# Patient Record
Sex: MALE | Race: WHITE | NOT HISPANIC OR LATINO | Employment: OTHER | URBAN - METROPOLITAN AREA
[De-identification: names, ages, dates, MRNs, and addresses within clinical notes are randomized per-mention and may not be internally consistent; named-entity substitution may affect disease eponyms.]

---

## 2023-03-18 ENCOUNTER — HOSPITAL ENCOUNTER (OUTPATIENT)
Dept: ULTRASOUND IMAGING | Facility: CLINIC | Age: 88
Discharge: HOME | End: 2023-03-18
Attending: NURSE PRACTITIONER | Admitting: NURSE PRACTITIONER
Payer: COMMERCIAL

## 2023-03-18 DIAGNOSIS — Z01.89 ENCOUNTER FOR OTHER SPECIFIED SPECIAL EXAMINATIONS: ICD-10-CM

## 2023-03-18 PROCEDURE — 76775 US EXAM ABDO BACK WALL LIM: CPT

## 2023-08-17 ENCOUNTER — HOSPITAL ENCOUNTER (OUTPATIENT)
Dept: ULTRASOUND IMAGING | Facility: CLINIC | Age: 88
Discharge: HOME | End: 2023-08-17
Payer: COMMERCIAL

## 2023-08-17 DIAGNOSIS — Z01.89 ENCOUNTER FOR OTHER SPECIFIED SPECIAL EXAMINATIONS: ICD-10-CM

## 2023-08-17 PROCEDURE — 93925 LOWER EXTREMITY STUDY: CPT

## 2023-10-11 ENCOUNTER — HOSPITAL ENCOUNTER (OUTPATIENT)
Dept: CT IMAGING | Facility: CLINIC | Age: 88
Discharge: HOME | End: 2023-10-11
Attending: NURSE PRACTITIONER | Admitting: NURSE PRACTITIONER
Payer: COMMERCIAL

## 2023-10-11 DIAGNOSIS — Z01.89 ENCOUNTER FOR OTHER SPECIFIED SPECIAL EXAMINATIONS: ICD-10-CM

## 2023-10-11 LAB
CREAT BLD-MCNC: 1.1 MG/DL (ref 0.7–1.3)
EGFRCR SERPLBLD CKD-EPI 2021: >60 ML/MIN/1.73M2

## 2023-10-11 PROCEDURE — 75635 CT ANGIO ABDOMINAL ARTERIES: CPT

## 2023-10-11 PROCEDURE — 250N000009 HC RX 250: Performed by: RADIOLOGY

## 2023-10-11 PROCEDURE — 82565 ASSAY OF CREATININE: CPT

## 2023-10-11 PROCEDURE — 250N000011 HC RX IP 250 OP 636: Performed by: RADIOLOGY

## 2023-10-11 RX ORDER — IOPAMIDOL 755 MG/ML
90 INJECTION, SOLUTION INTRAVASCULAR ONCE
Status: COMPLETED | OUTPATIENT
Start: 2023-10-11 | End: 2023-10-11

## 2023-10-11 RX ADMIN — SODIUM CHLORIDE 100 ML: 9 INJECTION, SOLUTION INTRAVENOUS at 15:17

## 2023-10-11 RX ADMIN — IOPAMIDOL 90 ML: 755 INJECTION, SOLUTION INTRAVENOUS at 15:17

## 2024-05-18 ENCOUNTER — HOSPITAL ENCOUNTER (OUTPATIENT)
Dept: ULTRASOUND IMAGING | Facility: CLINIC | Age: 89
Discharge: HOME | End: 2024-05-18
Attending: NURSE PRACTITIONER | Admitting: NURSE PRACTITIONER
Payer: COMMERCIAL

## 2024-05-18 DIAGNOSIS — Z01.89 ENCOUNTER FOR OTHER SPECIFIED SPECIAL EXAMINATIONS: ICD-10-CM

## 2024-05-18 PROCEDURE — 93978 VASCULAR STUDY: CPT

## 2025-04-18 ENCOUNTER — APPOINTMENT (OUTPATIENT)
Dept: RADIOLOGY | Facility: HOSPITAL | Age: OVER 89
DRG: 301 | End: 2025-04-18
Attending: EMERGENCY MEDICINE
Payer: COMMERCIAL

## 2025-04-18 ENCOUNTER — HOSPITAL ENCOUNTER (INPATIENT)
Facility: HOSPITAL | Age: OVER 89
LOS: 2 days | Discharge: NURSING, REHAB OR LONG-TERM CARE | DRG: 301 | End: 2025-04-21
Attending: EMERGENCY MEDICINE | Admitting: STUDENT IN AN ORGANIZED HEALTH CARE EDUCATION/TRAINING PROGRAM
Payer: COMMERCIAL

## 2025-04-18 ENCOUNTER — APPOINTMENT (OUTPATIENT)
Dept: CT IMAGING | Facility: HOSPITAL | Age: OVER 89
DRG: 301 | End: 2025-04-18
Attending: EMERGENCY MEDICINE
Payer: COMMERCIAL
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
## 2025-04-18 DIAGNOSIS — R53.1 WEAKNESS: ICD-10-CM

## 2025-04-18 DIAGNOSIS — I82.4Z2 ACUTE DEEP VEIN THROMBOSIS (DVT) OF DISTAL END OF LEFT LOWER EXTREMITY (H): Primary | ICD-10-CM

## 2025-04-18 LAB
ALBUMIN UR-MCNC: 70 MG/DL
ANION GAP SERPL CALCULATED.3IONS-SCNC: 8 MMOL/L (ref 7–15)
APPEARANCE UR: CLEAR
BILIRUB UR QL STRIP: NEGATIVE
BUN SERPL-MCNC: 28.2 MG/DL (ref 8–23)
CALCIUM SERPL-MCNC: 8.5 MG/DL (ref 8.8–10.4)
CHLORIDE SERPL-SCNC: 106 MMOL/L (ref 98–107)
CK SERPL-CCNC: 1484 U/L (ref 39–308)
COLOR UR AUTO: ABNORMAL
CREAT SERPL-MCNC: 0.92 MG/DL (ref 0.67–1.17)
EGFRCR SERPLBLD CKD-EPI 2021: 78 ML/MIN/1.73M2
ERYTHROCYTE [DISTWIDTH] IN BLOOD BY AUTOMATED COUNT: 14.4 % (ref 10–15)
GLUCOSE BLDC GLUCOMTR-MCNC: 188 MG/DL (ref 70–99)
GLUCOSE SERPL-MCNC: 119 MG/DL (ref 70–99)
GLUCOSE UR STRIP-MCNC: NEGATIVE MG/DL
HCO3 SERPL-SCNC: 28 MMOL/L (ref 22–29)
HCT VFR BLD AUTO: 36.2 % (ref 40–53)
HGB BLD-MCNC: 11.2 G/DL (ref 13.3–17.7)
HGB UR QL STRIP: ABNORMAL
HOLD SPECIMEN: NORMAL
KETONES UR STRIP-MCNC: NEGATIVE MG/DL
LEUKOCYTE ESTERASE UR QL STRIP: NEGATIVE
MAGNESIUM SERPL-MCNC: 1.8 MG/DL (ref 1.7–2.3)
MCH RBC QN AUTO: 26.2 PG (ref 26.5–33)
MCHC RBC AUTO-ENTMCNC: 30.9 G/DL (ref 31.5–36.5)
MCV RBC AUTO: 85 FL (ref 78–100)
MUCOUS THREADS #/AREA URNS LPF: PRESENT /LPF
NITRATE UR QL: NEGATIVE
PH UR STRIP: 5 [PH] (ref 5–7)
PLATELET # BLD AUTO: 175 10E3/UL (ref 150–450)
POTASSIUM SERPL-SCNC: 4 MMOL/L (ref 3.4–5.3)
RBC # BLD AUTO: 4.27 10E6/UL (ref 4.4–5.9)
RBC URINE: 3 /HPF
SODIUM SERPL-SCNC: 142 MMOL/L (ref 135–145)
SP GR UR STRIP: 1.03 (ref 1–1.03)
TROPONIN T SERPL HS-MCNC: 20 NG/L
TROPONIN T SERPL HS-MCNC: 21 NG/L
TSH SERPL DL<=0.005 MIU/L-ACNC: 2.6 UIU/ML (ref 0.3–4.2)
UROBILINOGEN UR STRIP-MCNC: NORMAL MG/DL
WBC # BLD AUTO: 9.6 10E3/UL (ref 4–11)
WBC URINE: 1 /HPF

## 2025-04-18 PROCEDURE — 82550 ASSAY OF CK (CPK): CPT

## 2025-04-18 PROCEDURE — 72100 X-RAY EXAM L-S SPINE 2/3 VWS: CPT

## 2025-04-18 PROCEDURE — 81003 URINALYSIS AUTO W/O SCOPE: CPT | Performed by: EMERGENCY MEDICINE

## 2025-04-18 PROCEDURE — 84443 ASSAY THYROID STIM HORMONE: CPT | Performed by: EMERGENCY MEDICINE

## 2025-04-18 PROCEDURE — 83735 ASSAY OF MAGNESIUM: CPT | Performed by: EMERGENCY MEDICINE

## 2025-04-18 PROCEDURE — 258N000003 HC RX IP 258 OP 636: Performed by: EMERGENCY MEDICINE

## 2025-04-18 PROCEDURE — 80048 BASIC METABOLIC PNL TOTAL CA: CPT | Performed by: EMERGENCY MEDICINE

## 2025-04-18 PROCEDURE — 82962 GLUCOSE BLOOD TEST: CPT

## 2025-04-18 PROCEDURE — 96372 THER/PROPH/DIAG INJ SC/IM: CPT

## 2025-04-18 PROCEDURE — 96360 HYDRATION IV INFUSION INIT: CPT | Mod: 59

## 2025-04-18 PROCEDURE — 99285 EMERGENCY DEPT VISIT HI MDM: CPT | Mod: 25

## 2025-04-18 PROCEDURE — 84484 ASSAY OF TROPONIN QUANT: CPT | Performed by: EMERGENCY MEDICINE

## 2025-04-18 PROCEDURE — G0378 HOSPITAL OBSERVATION PER HR: HCPCS

## 2025-04-18 PROCEDURE — 250N000011 HC RX IP 250 OP 636

## 2025-04-18 PROCEDURE — 72125 CT NECK SPINE W/O DYE: CPT

## 2025-04-18 PROCEDURE — 93005 ELECTROCARDIOGRAM TRACING: CPT | Performed by: EMERGENCY MEDICINE

## 2025-04-18 PROCEDURE — 70450 CT HEAD/BRAIN W/O DYE: CPT

## 2025-04-18 PROCEDURE — 250N000013 HC RX MED GY IP 250 OP 250 PS 637

## 2025-04-18 PROCEDURE — 85027 COMPLETE CBC AUTOMATED: CPT | Performed by: EMERGENCY MEDICINE

## 2025-04-18 PROCEDURE — 36415 COLL VENOUS BLD VENIPUNCTURE: CPT | Performed by: EMERGENCY MEDICINE

## 2025-04-18 PROCEDURE — 96361 HYDRATE IV INFUSION ADD-ON: CPT

## 2025-04-18 PROCEDURE — 258N000003 HC RX IP 258 OP 636

## 2025-04-18 PROCEDURE — 72072 X-RAY EXAM THORAC SPINE 3VWS: CPT

## 2025-04-18 RX ORDER — ACETAMINOPHEN 325 MG/1
975 TABLET ORAL 3 TIMES DAILY
Status: DISCONTINUED | OUTPATIENT
Start: 2025-04-18 | End: 2025-04-21 | Stop reason: HOSPADM

## 2025-04-18 RX ORDER — HYDROCHLOROTHIAZIDE 12.5 MG/1
12.5 TABLET ORAL EVERY EVENING
Status: DISCONTINUED | OUTPATIENT
Start: 2025-04-18 | End: 2025-04-21 | Stop reason: HOSPADM

## 2025-04-18 RX ORDER — AMOXICILLIN 250 MG
2 CAPSULE ORAL 2 TIMES DAILY
Status: DISCONTINUED | OUTPATIENT
Start: 2025-04-18 | End: 2025-04-21 | Stop reason: HOSPADM

## 2025-04-18 RX ORDER — DOXAZOSIN 4 MG/1
8 TABLET ORAL AT BEDTIME
Status: DISCONTINUED | OUTPATIENT
Start: 2025-04-18 | End: 2025-04-21 | Stop reason: HOSPADM

## 2025-04-18 RX ORDER — AMOXICILLIN 250 MG
1 CAPSULE ORAL 2 TIMES DAILY
Status: DISCONTINUED | OUTPATIENT
Start: 2025-04-18 | End: 2025-04-21 | Stop reason: HOSPADM

## 2025-04-18 RX ORDER — ACETAMINOPHEN 650 MG/1
975 SUPPOSITORY RECTAL 3 TIMES DAILY
Status: DISCONTINUED | OUTPATIENT
Start: 2025-04-18 | End: 2025-04-21 | Stop reason: HOSPADM

## 2025-04-18 RX ORDER — VITAMIN B COMPLEX
1 TABLET ORAL 2 TIMES DAILY
COMMUNITY

## 2025-04-18 RX ORDER — DOXAZOSIN 8 MG/1
8 TABLET ORAL AT BEDTIME
COMMUNITY
Start: 2024-09-19

## 2025-04-18 RX ORDER — METOPROLOL TARTRATE 25 MG/1
25 TABLET, FILM COATED ORAL EVERY 12 HOURS
COMMUNITY
Start: 2023-12-14 | End: 2025-04-18

## 2025-04-18 RX ORDER — METOPROLOL TARTRATE 25 MG/1
25 TABLET, FILM COATED ORAL 2 TIMES DAILY
COMMUNITY

## 2025-04-18 RX ORDER — CALCIUM CARBONATE/VITAMIN D3 600 MG-10
250 TABLET ORAL EVERY EVENING
Status: DISCONTINUED | OUTPATIENT
Start: 2025-04-18 | End: 2025-04-21 | Stop reason: HOSPADM

## 2025-04-18 RX ORDER — AMOXICILLIN 250 MG
2 CAPSULE ORAL 2 TIMES DAILY PRN
Status: DISCONTINUED | OUTPATIENT
Start: 2025-04-18 | End: 2025-04-21 | Stop reason: HOSPADM

## 2025-04-18 RX ORDER — SILDENAFIL 100 MG/1
100 TABLET, FILM COATED ORAL PRN
COMMUNITY

## 2025-04-18 RX ORDER — VITAMIN B COMPLEX
25 TABLET ORAL 2 TIMES DAILY
Status: DISCONTINUED | OUTPATIENT
Start: 2025-04-18 | End: 2025-04-21 | Stop reason: HOSPADM

## 2025-04-18 RX ORDER — ONDANSETRON 2 MG/ML
4 INJECTION INTRAMUSCULAR; INTRAVENOUS EVERY 6 HOURS PRN
Status: DISCONTINUED | OUTPATIENT
Start: 2025-04-18 | End: 2025-04-21 | Stop reason: HOSPADM

## 2025-04-18 RX ORDER — ASPIRIN 81 MG/1
81 TABLET ORAL EVERY EVENING
Status: DISCONTINUED | OUTPATIENT
Start: 2025-04-18 | End: 2025-04-21 | Stop reason: HOSPADM

## 2025-04-18 RX ORDER — PROCHLORPERAZINE MALEATE 5 MG/1
5 TABLET ORAL EVERY 6 HOURS PRN
Status: DISCONTINUED | OUTPATIENT
Start: 2025-04-18 | End: 2025-04-21 | Stop reason: HOSPADM

## 2025-04-18 RX ORDER — ENOXAPARIN SODIUM 100 MG/ML
40 INJECTION SUBCUTANEOUS EVERY 24 HOURS
Status: DISCONTINUED | OUTPATIENT
Start: 2025-04-18 | End: 2025-04-21 | Stop reason: HOSPADM

## 2025-04-18 RX ORDER — ATORVASTATIN CALCIUM 40 MG/1
40 TABLET, FILM COATED ORAL AT BEDTIME
COMMUNITY

## 2025-04-18 RX ORDER — ATORVASTATIN CALCIUM 40 MG/1
40 TABLET, FILM COATED ORAL AT BEDTIME
Status: DISCONTINUED | OUTPATIENT
Start: 2025-04-18 | End: 2025-04-21 | Stop reason: HOSPADM

## 2025-04-18 RX ORDER — HYDROCHLOROTHIAZIDE 12.5 MG/1
12.5 TABLET ORAL DAILY
COMMUNITY
Start: 2024-10-24

## 2025-04-18 RX ORDER — HYDROCODONE BITARTRATE AND ACETAMINOPHEN 7.5; 325 MG/1; MG/1
1 TABLET ORAL EVERY 6 HOURS PRN
COMMUNITY

## 2025-04-18 RX ORDER — METOPROLOL TARTRATE 25 MG/1
25 TABLET, FILM COATED ORAL 2 TIMES DAILY
Status: DISCONTINUED | OUTPATIENT
Start: 2025-04-18 | End: 2025-04-21 | Stop reason: HOSPADM

## 2025-04-18 RX ORDER — ASPIRIN 81 MG/1
81 TABLET ORAL DAILY
COMMUNITY

## 2025-04-18 RX ORDER — OXYCODONE HYDROCHLORIDE 5 MG/1
5 TABLET ORAL EVERY 4 HOURS PRN
Status: DISCONTINUED | OUTPATIENT
Start: 2025-04-18 | End: 2025-04-21 | Stop reason: HOSPADM

## 2025-04-18 RX ORDER — AMOXICILLIN 250 MG
1 CAPSULE ORAL 2 TIMES DAILY PRN
Status: DISCONTINUED | OUTPATIENT
Start: 2025-04-18 | End: 2025-04-21 | Stop reason: HOSPADM

## 2025-04-18 RX ORDER — GABAPENTIN 300 MG/1
300 TABLET, FILM COATED ORAL DAILY
COMMUNITY
End: 2025-04-18

## 2025-04-18 RX ORDER — ONDANSETRON 4 MG/1
4 TABLET, ORALLY DISINTEGRATING ORAL EVERY 6 HOURS PRN
Status: DISCONTINUED | OUTPATIENT
Start: 2025-04-18 | End: 2025-04-21 | Stop reason: HOSPADM

## 2025-04-18 RX ADMIN — OXYCODONE HYDROCHLORIDE 5 MG: 5 TABLET ORAL at 23:38

## 2025-04-18 RX ADMIN — ASPIRIN 81 MG: 81 TABLET, COATED ORAL at 19:32

## 2025-04-18 RX ADMIN — METOPROLOL TARTRATE 25 MG: 25 TABLET, FILM COATED ORAL at 19:32

## 2025-04-18 RX ADMIN — ENOXAPARIN SODIUM 40 MG: 40 INJECTION SUBCUTANEOUS at 19:32

## 2025-04-18 RX ADMIN — OXYCODONE HYDROCHLORIDE 5 MG: 5 TABLET ORAL at 19:32

## 2025-04-18 RX ADMIN — SENNOSIDES AND DOCUSATE SODIUM 1 TABLET: 50; 8.6 TABLET ORAL at 19:38

## 2025-04-18 RX ADMIN — SODIUM CHLORIDE, SODIUM LACTATE, POTASSIUM CHLORIDE, AND CALCIUM CHLORIDE 500 ML: .6; .31; .03; .02 INJECTION, SOLUTION INTRAVENOUS at 19:42

## 2025-04-18 RX ADMIN — ATORVASTATIN CALCIUM 40 MG: 40 TABLET, FILM COATED ORAL at 21:31

## 2025-04-18 RX ADMIN — CYANOCOBALAMIN TAB 250 MCG 250 MCG: 250 TAB at 19:32

## 2025-04-18 RX ADMIN — HYDROCHLOROTHIAZIDE 12.5 MG: 12.5 TABLET ORAL at 19:42

## 2025-04-18 RX ADMIN — SENNOSIDES AND DOCUSATE SODIUM 1 TABLET: 50; 8.6 TABLET ORAL at 19:32

## 2025-04-18 RX ADMIN — Medication 25 MCG: at 19:32

## 2025-04-18 RX ADMIN — ACETAMINOPHEN 975 MG: 325 TABLET, FILM COATED ORAL at 19:32

## 2025-04-18 RX ADMIN — DOXAZOSIN 8 MG: 4 TABLET ORAL at 21:31

## 2025-04-18 RX ADMIN — SODIUM CHLORIDE 500 ML: 9 INJECTION, SOLUTION INTRAVENOUS at 13:53

## 2025-04-18 ASSESSMENT — ACTIVITIES OF DAILY LIVING (ADL)
ADLS_ACUITY_SCORE: 41
ADLS_ACUITY_SCORE: 38
ADLS_ACUITY_SCORE: 41
ADLS_ACUITY_SCORE: 46
ADLS_ACUITY_SCORE: 41
ADLS_ACUITY_SCORE: 36
ADLS_ACUITY_SCORE: 41
ADLS_ACUITY_SCORE: 38
ADLS_ACUITY_SCORE: 41

## 2025-04-18 ASSESSMENT — COLUMBIA-SUICIDE SEVERITY RATING SCALE - C-SSRS
2. HAVE YOU ACTUALLY HAD ANY THOUGHTS OF KILLING YOURSELF IN THE PAST MONTH?: NO
6. HAVE YOU EVER DONE ANYTHING, STARTED TO DO ANYTHING, OR PREPARED TO DO ANYTHING TO END YOUR LIFE?: NO
1. IN THE PAST MONTH, HAVE YOU WISHED YOU WERE DEAD OR WISHED YOU COULD GO TO SLEEP AND NOT WAKE UP?: NO

## 2025-04-18 NOTE — ED TRIAGE NOTES
Pt arrive via [facility] after having multiple falls and altered mental status. Pt is alert to self, situation, time, and place. Denies blood thinner use or hitting head but later states he can't remember. EMS placed on 2L via NC for mid 80s sats. Room air currently at 96%.     Triage Assessment (Adult)       Row Name 04/18/25 1252          Triage Assessment    Airway WDL WDL        Respiratory WDL    Respiratory WDL WDL        Cardiac WDL    Cardiac WDL X;rhythm     Cardiac Rhythm Heart block        Peripheral/Neurovascular WDL    Peripheral Neurovascular WDL WDL        Cognitive/Neuro/Behavioral WDL    Cognitive/Neuro/Behavioral WDL X     Level of Consciousness intermittent confusion     Arousal Level opens eyes spontaneously     Orientation person;place;time;situation     Speech clear     Mood/Behavior cooperative;calm        Pupils (CN II)    Pupil PERRLA yes     Pupil Size Left 2 mm     Pupil Size Right 2 mm

## 2025-04-18 NOTE — ED NOTES
"[facility] ED Handoff Report    ED Chief Complaint: Falls    ED Diagnosis:  (R53.1) Weakness  Comment:   Plan:     (W19.XXXA,  Y92.009) Fall at home, initial encounter  Comment:   Plan:        PMH:  No past medical history on file.     Code Status:  Full Code     Falls Risk: Yes Band: Applied    Current Living Situation/Residence: lives alone     Elimination Status: Continent: Yes     Activity Level: SBA-1 assist    Patients Preferred Language:  English     Needed: No    Vital Signs:  BP (!) 143/66   Pulse 86   Temp 98.5  F (36.9  C) (Oral)   Resp 27   Ht 1.753 m (5' 9\")   Wt 92.9 kg (204 lb 14.4 oz)   SpO2 93%   BMI 30.26 kg/m       Cardiac Rhythm: NSR    Pain Score: 3/10    Is the Patient Confused:  Yes Where is the patient located? He has intermittent confusion or impulsion. Calls frequently on light wanting to know the plan.    Last Food or Drink: 04/18/25 at dinner    Focused Assessment:  Pt arrived via EMS after having weakness and frequent falls. No thinners, no LOC. Pt using call light frequently to ask what the plan is. Would benefit from PT/OT. Alert et oriented to person and place. Pleasant and compliant.     Tests Performed: Done: Labs and Imaging    Treatments Provided:  IVFs    Family Dynamics/Concerns: No    Family Updated On Visitor Policy: Yes    Plan of Care Communicated to Family: Yes    Who Was Updated about Plan of Care: Niece who is emergency contact. She lives in [place].    Belongings Checklist Done and Signed by Patient: Yes    Medications sent with patient: None    Covid: asymptomatic , didn't test    Additional Information: Prefers recliner due to chronic back pain      Triny Mixon RN 4/18/2025 6:38 PM       "

## 2025-04-18 NOTE — SUMMARY OF CARE
Patient admitted to room 10 at approximately 1856 via wheel chair from emergency room.    Patient ambulated/transferred:  with stand-by assist. self.    Detailed List of Belongings (be very specific listing out each item):  Phone  Shoes  Jacket

## 2025-04-18 NOTE — ED PROVIDER NOTES
EMERGENCY DEPARTMENT ENCOUnter      NAME: Angelica Maharaj  AGE: 89 year old male  YOB: 1932  MRN: [id number]  EVALUATION DATE & TIME: 2025 12:54 PM    [facility]P: [facility]    ED PROVIDER: Leif Allen DO      Chief Complaint   Patient presents with    Fall    Altered Mental Status         FINAL IMPRESSION:  1. Weakness    2. Fall at home, initial encounter          ED COURSE & MEDICAL DECISION MAKIN:05 PM I met with the patient in room 19, obtained history, performed an initial exam, and discussed options and plan for diagnostics and treatment here in the ED.      The patient presented to the emergency department today due to weakness.  He has had several falls recently and does mention that he has hit his head.  He is not on any blood thinner medications.  No signs of significant traumatic injury on exam.  Initial laboratory testing today is unremarkable along with imaging test.  Urinalysis and remaining lab tests are pending at the time of signout to the oncoming provider.  Patient will likely require admission given his multiple recent falls and difficulty taking care of himself at home.        Medical Decision Making  Admission considered. Patient was signed out to the oncoming physician, disposition pending.    MIPS (CTPE, Dental pain, Boyd, Sinusitis, Asthma/COPD, Head Trauma): Not Applicable    SEPSIS: None      At the conclusion of the encounter I discussed the results of all of the tests and the disposition. The questions were answered. The patient or family acknowledged understanding and was agreeable with the care plan.       MEDICATIONS GIVEN IN THE EMERGENCY:  Medications   sodium chloride 0.9% BOLUS 500 mL (500 mLs Intravenous $New Bag 25 4951)     =================================================================    HPI        Angelica Maharaj is a 89 year old male with a pertinent history of  who presents to this ED by EMS for evaluation of fall and  "altered mental status.     Patient has had multiple falls today. He is unsure why he fell, but endorses worsened chronic back pain. His pain is located over his entire back. He did hit his head during the falls. He does not endorse pain anywhere but his back. He denied any difficulty breathing or abdominal pain.  He does not take any blood thinner medications.  No other current complaints.      PAST MEDICAL HISTORY:  No past medical history on file.    PAST SURGICAL HISTORY:  No past surgical history on file.        CURRENT MEDICATIONS:    doxazosin (CARDURA) 8 MG tablet  hydroCHLOROthiazide 12.5 MG tablet  metoprolol tartrate (LOPRESSOR) 25 MG tablet  vitamin B-12 (CYANOCOBALAMIN) 250 MCG tablet  gabapentin (GRALISE) 300 MG 24 hr tablet  raNITIdine HCl (RANITIDINE 150 MAX STRENGTH PO)  study - aspirin, IDS# [id number], 81 mg tablet        ALLERGIES:  No Known Allergies    FAMILY HISTORY:  No family history on file.    SOCIAL HISTORY:   Social History     Socioeconomic History    Marital status: Single       VITALS:  Patient Vitals for the past 24 hrs:   BP Temp Temp src Pulse Resp SpO2 Height Weight   04/18/25 1339 (!) 177/75 -- -- 87 24 96 % -- --   04/18/25 1324 (!) 177/73 -- -- 90 27 96 % -- --   04/18/25 1315 (!) 179/76 -- -- 85 -- 96 % -- --   04/18/25 1309 (!) 179/75 -- -- 85 20 96 % -- --   04/18/25 1255 (!) 185/74 98.5  F (36.9  C) Oral 89 22 95 % 1.753 m (5' 9\") 92.9 kg (204 lb 14.4 oz)       PHYSICAL EXAM    Constitutional:  Well developed, Well nourished,  HENT:  Normocephalic, Atraumatic, Oropharynx moist, Nose normal.   Eyes:  EOMI, Conjunctiva normal, No discharge.   Respiratory:  Normal breath sounds, No respiratory distress, No wheezing, No chest tenderness.   Cardiovascular:  Normal heart rate, Normal rhythm, No murmurs  GI:  Soft, No tenderness, No guarding, No CVA tenderness.   Musculoskeletal:  No tenderness to palpation or major deformities noted.   Extremities: No lower extremity edema.  No signs of " bony deformities or significant trauma.  Neurologic:  Alert & oriented x 3, No focal deficits noted.   Psychiatric:  Affect normal, Judgment normal, Mood normal.        LAB:  All pertinent labs reviewed and interpreted.  Results for orders placed or performed during the hospital encounter of 04/18/25                                                                                                                     CBC with platelets   Result Value Ref Range    WBC Count 9.6 4.0 - 11.0 10e3/uL    RBC Count 4.27 (L) 4.40 - 5.90 10e6/uL    Hemoglobin 11.2 (L) 13.3 - 17.7 g/dL    Hematocrit 36.2 (L) 40.0 - 53.0 %    MCV 85 78 - 100 fL    MCH 26.2 (L) 26.5 - 33.0 pg    MCHC 30.9 (L) 31.5 - 36.5 g/dL    RDW 14.4 10.0 - 15.0 %    Platelet Count 175 150 - 450 10e3/uL   Basic metabolic panel   Result Value Ref Range    Sodium 142 135 - 145 mmol/L    Potassium 4.0 3.4 - 5.3 mmol/L    Chloride 106 98 - 107 mmol/L    Carbon Dioxide (CO2) 28 22 - 29 mmol/L    Anion Gap 8 7 - 15 mmol/L    Urea Nitrogen 28.2 (H) 8.0 - 23.0 mg/dL    Creatinine 0.92 0.67 - 1.17 mg/dL    GFR Estimate 78 >60 mL/min/1.73m2    Calcium 8.5 (L) 8.8 - 10.4 mg/dL    Glucose 119 (H) 70 - 99 mg/dL   Result Value Ref Range    Troponin T, High Sensitivity 20 <=22 ng/L   Result Value Ref Range    Magnesium 1.8 1.7 - 2.3 mg/dL       RADIOLOGY:  I have independently reviewed and interpreted the above imaging, pending the final radiology read.  XR Lumbar Spine 2/3 Views   Final Result   IMPRESSION:    Thoracic spine: Somewhat limited evaluation due to osteopenia and overlapping tissue. Alignment is within normal limits. No spondylolisthesis. No findings to suggest an acute fracture. Normal disc spaces for age. Multilevel endplate osteophyte formation.    Median sternotomy and CABG.      Lumbar spine: Somewhat limited evaluation due to osteopenia and overlapping tissue. Alignment is within normal limits. No spondylolisthesis. No findings to suggest an acute  fracture. Mild multilevel disc space height loss. Associated endplate osteophyte    formation and facet arthropathy. Aortoiliac stent.       XR Thoracic Spine 3 Views   Final Result   IMPRESSION:    Thoracic spine: Somewhat limited evaluation due to osteopenia and overlapping tissue. Alignment is within normal limits. No spondylolisthesis. No findings to suggest an acute fracture. Normal disc spaces for age. Multilevel endplate osteophyte formation.    Median sternotomy and CABG.      Lumbar spine: Somewhat limited evaluation due to osteopenia and overlapping tissue. Alignment is within normal limits. No spondylolisthesis. No findings to suggest an acute fracture. Mild multilevel disc space height loss. Associated endplate osteophyte    formation and facet arthropathy. Aortoiliac stent.       CT Cervical Spine w/o Contrast   Final Result   IMPRESSION:   1.  No fracture or posttraumatic subluxation.   2.  No high-grade spinal canal or neural foraminal stenosis.      CT Head w/o Contrast   Final Result   IMPRESSION:   1.  No CT evidence for acute intracranial process. Brain atrophy and presumed chronic microvascular ischemic changes as above.             EKG:    Normal sinus rhythm at 86 bpm.  Right bundle branch block.  No signs of acute ischemia.   ms, QTc 512 ms.    I have independently reviewed and interpreted this EKG          I, Jose Martin Yepez, am serving as a scribe to document services personally performed by Dr. Allen based on my observation and the provider's statements to me. I, Leif Allen, DO attest that Jose Martin Yepez is acting in a scribe capacity, has observed my performance of the services and has documented them in accordance with my direction.    Leif Allen DO  Emergency Medicine  [facility] EMERGENCY DEPARTMENT  [facility] [facility] [id number] [phone]  Dept: [phone]       Leif Allen DO  04/18/25 1519       Leif Allen  DO Tawanda  04/18/25 1517

## 2025-04-18 NOTE — ED PROVIDER NOTES
"TRANSFER OF CARE NOTE ED SIGNOUT      HPI    Patient seen by Dr. Allen  For   fall and altered mental status  and signed out  care at 3:35 PM.     Pending studies include Urinalysis for admission.             PHYSICAL EXAM      VITAL SIGNS: BP (!) 143/66   Pulse 86   Temp 98.5  F (36.9  C) (Oral)   Resp 27   Ht 1.753 m (5' 9\")   Wt 92.9 kg (204 lb 14.4 oz)   SpO2 93%   BMI 30.26 kg/m    Repeat exam reveals patient is awake alert eating.  Denies any complaints.  Asked me why I am asking him the year.  He thinks is 2015.      LABS  Pertinent lab results reviewed in chart.  Results for orders placed or performed during the hospital encounter of 04/18/25   XR Thoracic Spine 3 Views    Impression    IMPRESSION:   Thoracic spine: Somewhat limited evaluation due to osteopenia and overlapping tissue. Alignment is within normal limits. No spondylolisthesis. No findings to suggest an acute fracture. Normal disc spaces for age. Multilevel endplate osteophyte formation.   Median sternotomy and CABG.    Lumbar spine: Somewhat limited evaluation due to osteopenia and overlapping tissue. Alignment is within normal limits. No spondylolisthesis. No findings to suggest an acute fracture. Mild multilevel disc space height loss. Associated endplate osteophyte   formation and facet arthropathy. Aortoiliac stent.    XR Lumbar Spine 2/3 Views    Impression    IMPRESSION:   Thoracic spine: Somewhat limited evaluation due to osteopenia and overlapping tissue. Alignment is within normal limits. No spondylolisthesis. No findings to suggest an acute fracture. Normal disc spaces for age. Multilevel endplate osteophyte formation.   Median sternotomy and CABG.    Lumbar spine: Somewhat limited evaluation due to osteopenia and overlapping tissue. Alignment is within normal limits. No spondylolisthesis. No findings to suggest an acute fracture. Mild multilevel disc space height loss. Associated endplate osteophyte   formation and facet " arthropathy. Aortoiliac stent.    CT Head w/o Contrast    Impression    IMPRESSION:  1.  No CT evidence for acute intracranial process. Brain atrophy and presumed chronic microvascular ischemic changes as above.     CT Cervical Spine w/o Contrast    Impression    IMPRESSION:  1.  No fracture or posttraumatic subluxation.  2.  No high-grade spinal canal or neural foraminal stenosis.   Extra Blue Top Tube   Result Value Ref Range    Hold Specimen JIC    Extra Red Top Tube   Result Value Ref Range    Hold Specimen JIC    Extra Green Top (Lithium Heparin) Tube   Result Value Ref Range    Hold Specimen JIC    Extra Purple Top Tube   Result Value Ref Range    Hold Specimen JIC    Extra Green Top (Lithium Heparin) ON ICE   Result Value Ref Range    Hold Specimen JIC    CBC with platelets   Result Value Ref Range    WBC Count 9.6 4.0 - 11.0 10e3/uL    RBC Count 4.27 (L) 4.40 - 5.90 10e6/uL    Hemoglobin 11.2 (L) 13.3 - 17.7 g/dL    Hematocrit 36.2 (L) 40.0 - 53.0 %    MCV 85 78 - 100 fL    MCH 26.2 (L) 26.5 - 33.0 pg    MCHC 30.9 (L) 31.5 - 36.5 g/dL    RDW 14.4 10.0 - 15.0 %    Platelet Count 175 150 - 450 10e3/uL   Basic metabolic panel   Result Value Ref Range    Sodium 142 135 - 145 mmol/L    Potassium 4.0 3.4 - 5.3 mmol/L    Chloride 106 98 - 107 mmol/L    Carbon Dioxide (CO2) 28 22 - 29 mmol/L    Anion Gap 8 7 - 15 mmol/L    Urea Nitrogen 28.2 (H) 8.0 - 23.0 mg/dL    Creatinine 0.92 0.67 - 1.17 mg/dL    GFR Estimate 78 >60 mL/min/1.73m2    Calcium 8.5 (L) 8.8 - 10.4 mg/dL    Glucose 119 (H) 70 - 99 mg/dL   UA with Microscopic reflex to Culture    Specimen: Urine, Clean Catch   Result Value Ref Range    Color Urine Light Yellow Colorless, Straw, Light Yellow, Yellow    Appearance Urine Clear Clear    Glucose Urine Negative Negative mg/dL    Bilirubin Urine Negative Negative    Ketones Urine Negative Negative mg/dL    Specific Gravity Urine 1.026 1.001 - 1.030    Blood Urine 0.2 mg/dL (A) Negative    pH Urine 5.0 5.0 -  7.0    Protein Albumin Urine 70 (A) Negative mg/dL    Urobilinogen Urine Normal Normal mg/dL    Nitrite Urine Negative Negative    Leukocyte Esterase Urine Negative Negative    Mucus Urine Present (A) None Seen /LPF    RBC Urine 3 (H) <=2 /HPF    WBC Urine 1 <=5 /HPF   Result Value Ref Range    Troponin T, High Sensitivity 20 <=22 ng/L   Result Value Ref Range    Troponin T, High Sensitivity 21 <=22 ng/L   Result Value Ref Range    TSH 2.60 0.30 - 4.20 uIU/mL   Result Value Ref Range    Magnesium 1.8 1.7 - 2.3 mg/dL         RADIOLOGY  XR Lumbar Spine 2/3 Views   Final Result   IMPRESSION:    Thoracic spine: Somewhat limited evaluation due to osteopenia and overlapping tissue. Alignment is within normal limits. No spondylolisthesis. No findings to suggest an acute fracture. Normal disc spaces for age. Multilevel endplate osteophyte formation.    Median sternotomy and CABG.      Lumbar spine: Somewhat limited evaluation due to osteopenia and overlapping tissue. Alignment is within normal limits. No spondylolisthesis. No findings to suggest an acute fracture. Mild multilevel disc space height loss. Associated endplate osteophyte    formation and facet arthropathy. Aortoiliac stent.       XR Thoracic Spine 3 Views   Final Result   IMPRESSION:    Thoracic spine: Somewhat limited evaluation due to osteopenia and overlapping tissue. Alignment is within normal limits. No spondylolisthesis. No findings to suggest an acute fracture. Normal disc spaces for age. Multilevel endplate osteophyte formation.    Median sternotomy and CABG.      Lumbar spine: Somewhat limited evaluation due to osteopenia and overlapping tissue. Alignment is within normal limits. No spondylolisthesis. No findings to suggest an acute fracture. Mild multilevel disc space height loss. Associated endplate osteophyte    formation and facet arthropathy. Aortoiliac stent.       CT Cervical Spine w/o Contrast   Final Result   IMPRESSION:   1.  No fracture or  posttraumatic subluxation.   2.  No high-grade spinal canal or neural foraminal stenosis.      CT Head w/o Contrast   Final Result   IMPRESSION:   1.  No CT evidence for acute intracranial process. Brain atrophy and presumed chronic microvascular ischemic changes as above.              ED COURSE & MEDICAL DECISION MAKING    3:35 PM The patient was signed out to me by Dr. Allen. I performed my initial exam and discussed plan for care.        At the conclusion of the encounter I discussed  the results of all of the tests and the disposition.   All questions were answered.  The patient acknowledged understanding and was agreeable with the care plan.      IMPRESSION    Mr. Maharaj is 89-year-old seen primarily by Dr. Allen please see his complete dictation.  He was signed out to me pending the urine analysis and admission.  Urinalysis does not reveal infection.  Patient admitted for frequent falls.      FINAL DIAGNOSIS:   1. Weakness    2. Fall at home, initial encounter          I, Pollo Freed, am serving as a scribe to document services personally performed by Pat Cannon MD based on my observations and the provider's statements to me.  I, Pat Cannon MD, attest that Pollo Freed is acting in a scribe capacity, has observed my performance of the services and has documented them in accordance with my direction.       Pat Cannon MD  04/18/25 4312

## 2025-04-18 NOTE — ED NOTES
Bed: JNED-19  Expected date:   Expected time:   Means of arrival:   Comments:  [facility] - 89M Multiple falls/AMS   A&O to self, neg stroke, 2nd deg type 1

## 2025-04-18 NOTE — MEDICATION SCRIBE - ADMISSION MEDICATION HISTORY
Medication Scribe Admission Medication History    Admission medication history is complete. The information provided in this note is only as accurate as the sources available at the time of the update.    Information Source(s): Patient via in-person    Pertinent Information: medication is managed by patient. Last medication taken was last night. (Per patient.) [facility] ([phone]) called pharmacy to confirmed he is not taking gabapentin.     Changes made to PTA medication list:  Added:   sildenafil, Norco , vitamin D3 , atorvastatin,   Deleted:   gabapentin, ranitidine  Changed:   Asprin from study, to Asprin 81 mg ec,     Allergies reviewed with patient and updates made in EHR: yes    Medication History Completed By: Aklilu Gebreyesus 4/18/2025 4:39 PM    PTA Med List   Medication Sig Last Dose/Taking    aspirin 81 MG EC tablet Take 81 mg by mouth daily. 4/17/2025 Bedtime    atorvastatin (LIPITOR) 40 MG tablet Take 40 mg by mouth at bedtime. 4/17/2025 Bedtime    doxazosin (CARDURA) 8 MG tablet Take 8 mg by mouth at bedtime. 4/17/2025 Evening    hydroCHLOROthiazide 12.5 MG tablet Take 12.5 mg by mouth daily. 4/17/2025 Evening    HYDROcodone-acetaminophen (NORCO) 7.5-325 MG per tablet Take 1 tablet by mouth every 6 hours as needed for severe pain, pain or moderate to severe pain. 4/17/2025 Bedtime    vitamin B-12 (CYANOCOBALAMIN) 250 MCG tablet Take 1 tablet by mouth daily. 4/17/2025 Evening    Vitamin D3 (VITAMIN D, CHOLECALCIFEROL,) 25 mcg (1000 units) tablet Take 1 tablet by mouth 2 times daily. 4/17/2025 Bedtime

## 2025-04-18 NOTE — H&P
[facility]    History and Physical - Hospitalist Service       Date of Admission:  4/18/2025    Assessment & Plan      Angelica Maharaj is a 89 year old male admitted on 4/18/2025. He has a history of AAA s/p stent graft repair (2011), CAD s/p CABG (~2009), hypertension, HLD, BPH, osteopenia, PVD who presented with recurrent falls at home and is admitted for close monitoring and further evaluation.    Recurrent Falls  Concern for cognitive dysfunction  Unclear etiology of his falls, though from the history he cannot remember appears most likely mechanical versus orthostatic hypotension. Especially worry it could be related to his chronic back pain/opiate use as below. No evidence of arrhythmia so far.  Unclear how long he was down, though was able to call his niece and an ambulance.  Workup reassuring with no ICH or vertebral fractures, labs largely normal. Vitals also reassuring, intermittently hypertensive though asymptomatic. Does report using walker and wheelchair at home which suggests history of gait difficulties, though history is unclear.  With his independent living status and current situation, he is very high risk for falls and recurrent falls, particularly with what appears to be some level of cognitive dysfunction in last 6 months, noted here as well.  Needs a safe disposition plan, suspect that he may need a higher level of care.  - Telemetry  - Orthostatic vitals ordered  - Delirium precautions  - PT/OT consulted  - Care management for dispo planning     Elevated CK  CK elevated to 1484, reassured that there is no evidence of kidney dysfunction. Electrolytes otherwise normal, does not appear to be florid rhabdo at this time. Though does suggest prolonged time down at home. Also now wondering if a prolonged time down could contribute to his unilateral leg swelling as below.   - s/p 500 ml NS bolus in ED  - Additional 500 ml LR bolus ordered now  - Recheck CK in  AM    Unilateral Left Leg Edema  Peripheral Vascular Disease  History of PVD with discussion of possible bypass in 2023, on exam with 2+ edema only in the left leg accompanied by tenderness in the left calf, though does have full range of motion.  Normal bilateral dorsalis pedis pulses also reassuring, leg does appear well-perfused.  No signs of skin or soft tissue infection.  - duplex Ultrasound ordered to r/o DVT   - MARISA left side ordered    Chronic back pain  Osteopenia  Managed long-term with chronic opiates, not reporting any recent changes or changes after the fall today though is having 8-9/10 pain.  Tender in the paraspinal muscles bilateral thoracic area.  No tenderness along spinous processes reassuring, along with no radiographic evidence of no fracture.  Wonder if worsening in this pain could have contributed to fall as above.  - Held PTA norco  - Scheduled tylenol 975 mg TID  - PRN oxycodone  - Can consider IV dilaudid if persistently uncontrolled, held off now given chronic nature  - PT/OT    Normocytic Anemia  Mild, hgb 11.2 on admission. Will continue to monitor.   - Daily CBC     Abdominal Tenderness  Constipation  Has been a couple days since last BM, normally daily, with some mild tenderness and distention though not an acute abdomen.   - Scheduled senna-docusate      Chronic conditions:  BPH - PTA doxazosin     HTN - PTA hydrochlorothiazide and metoprolol     AAA s/p stent graft repair (2011)  CAD s/p CABG  - PTA ASA 81    HLD  - PTA atorvastatin 40 mg             Observation Goals: -diagnostic tests and consults completed and resulted, -vital signs normal or at patient baseline, -tolerating oral intake to maintain hydration, -adequate pain control on oral analgesics, -returns to baseline functional status, -safe disposition plan has been identified, Nurse to notify provider when observation goals have been met and patient is ready for discharge.  Diet: Regular Diet Adult    DVT Prophylaxis:  "Enoxaparin (Lovenox) SQ  Boyd Catheter: Not present  Fluids: PO  Lines: None     Cardiac Monitoring: None  Code Status: Full Code    Clinically Significant Risk Factors Present on Admission                 # Drug Induced Platelet Defect: home medication list includes an antiplatelet medication   # Hypertension: Home medication list includes antihypertensive(s)           # Obesity: Estimated body mass index is 30.26 kg/m  as calculated from the following:    Height as of this encounter: 1.753 m (5' 9\").    Weight as of this encounter: 92.9 kg (204 lb 14.4 oz).              Disposition Plan      Expected Discharge Date: 04/19/2025                To be formally staffed in the AM       Oren Lamb MD  Hospitalist Service  [facility]  Securely message with [employer] (more info)  Text page via [facility] Paging/Directory   ______________________________________________________________________    Chief Complaint   Falls     History is obtained from the patient and patient's niece     History of Present Illness   Angelica Maharaj is a 89 year old male admitted on 4/18/2025. He has a history of AAA s/p stent graft repair (2011), CAD s/p CABG (~2009), hypertension, HLD, BPH, osteopenia, PVD who presented with recurrent falls at home    Patient able to provide history though there are many gaps due to his difficulty remembering.  He lives alone at [facility].  His niece is his emergency contact, she lives in [place] and he does not have any other close family here in the area.  Does have several friends but did not name them as decision-makers.     States that earlier today he had 2 falls when he was getting out of a chair.  He is unclear exactly what caused the falls, does not remember the fall or how long he was down. On remembers that he felt weak in the knees, was not feeling dizzy or lightheaded, no palpitations, no chest pain, no shortness of breath.  Also not reporting any headache, " abdominal pain, dysuria, or diarrhea.    States that this is a new problem today and he has not had problems with falling before.  States that he does use a walker and wheelchair to get around at home.    Also does have ongoing chronic back pain that bothers him, though has not had any recent changes to this.    Patient is a  vet, served in the [employer] and was stationed in [place].  [facility]  He is a former tobacco user, quit about 25 years ago after 40 pack years of smoking.  Does occasionally drink beer, about 2/week.  No other recreational substance use.        Spoke with niece as well:  - does have sister but they haven't talked in 20 years   - niece:  a couple years ago about medical decision making, did not fill out any paperwork.     Falls - niece unsure about falls. Talks to him 5-6 times per day. Did not answer today and was going to do a welfare check, but then he picked up. Did not mention anything about the falls. He called ambulance.     Medical history: CABG 16 years ago.   - Chronic back pain for 5 years. Can't walk very far because of it.   - Niece unaware of walker or wheelchair     [facility], [facility]. Stopped driving in last year because no vehicle.     Memory loss - niece has noticed in last 6 months, struggles with short term details.     Home - [place], [place]. Lives independently. Previously refusing nursing home. No other homes supports, does get rides to grocery store.             Past Medical History    No past medical history on file.    Past Surgical History   No past surgical history on file.    Prior to Admission Medications   Prior to Admission Medications   Prescriptions Last Dose Informant Patient Reported? Taking?   HYDROcodone-acetaminophen (NORCO) 7.5-325 MG per tablet 4/17/2025 Bedtime Self Yes Yes   Sig: Take 1 tablet by mouth every 6 hours as needed for severe pain, pain or moderate to severe pain.   Vitamin D3 (VITAMIN D,  CHOLECALCIFEROL,) 25 mcg (1000 units) tablet 4/17/2025 Bedtime Self Yes Yes   Sig: Take 1 tablet by mouth 2 times daily.   aspirin 81 MG EC tablet 4/17/2025 Bedtime Self Yes Yes   Sig: Take 81 mg by mouth daily.   atorvastatin (LIPITOR) 40 MG tablet 4/17/2025 Bedtime Self Yes Yes   Sig: Take 40 mg by mouth at bedtime.   doxazosin (CARDURA) 8 MG tablet 4/17/2025 Evening Self Yes Yes   Sig: Take 8 mg by mouth at bedtime.   hydroCHLOROthiazide 12.5 MG tablet 4/17/2025 Evening Self Yes Yes   Sig: Take 12.5 mg by mouth daily.   metoprolol tartrate (LOPRESSOR) 25 MG tablet 4/17/2025 Evening Self Yes Yes   Sig: Take 25 mg by mouth 2 times daily.   sildenafil (VIAGRA) 100 MG tablet Unknown Self Yes Yes   Sig: Take 100 mg by mouth as needed.   vitamin B-12 (CYANOCOBALAMIN) 250 MCG tablet 4/17/2025 Evening Self Yes Yes   Sig: Take 1 tablet by mouth daily.      Facility-Administered Medications: None         ROS negative except as noted in HPI above    Physical Exam   Vital Signs: Temp: 98.5  F (36.9  C) Temp src: Oral BP: (!) 143/66 Pulse: 86   Resp: 27 SpO2: 93 % O2 Device: None (Room air)    Weight: 204 lbs 14.4 oz    Constitutional: awake, alert, cooperative, no apparent distress. Sitting in chair  Eyes: Pupils equal, round and reactive to light, extra ocular muscles intact, sclera clear, conjunctiva normal  ENT: normocephalic, without obvious abnormality, atraumatic. MMM  Respiratory: No increased work of breathing, good air exchange, clear to auscultation bilaterally, no crackles or wheezing  Cardiovascular: Regular rate and rhythm, normal S1 and S2, no S3 or S4, and no murmur noted  GI: Soft, mildly distended, mild generalized tenderness to palpation near umbilicus with no rebound or guarding, no masses palpated, no hepatosplenomegaly  Skin: no rashes and no lesions visualized on exposed skin  Musculoskeletal: There is no redness, warmth, or swelling of the joints.  2+ pitting edema LLE to the knee, with tenderness to  palpation of left calf. Tenderness to palpation in B/L thoracic paraspinal muscles. No edema RLE.  Neurologic: Awake, alert, oriented to name, place (hospital in [place], not name) and time.  Cranial nerves II-XII are grossly intact.  Motor is 5 out of 5 bilaterally upper and lower extremities.  Sensory is intact to light touch in all four extremities.  Neuropsychiatric: General: normal, calm, and normal eye contact  Level of consciousness: alert / normal  Affect: normal and pleasant    Medical Decision Making       Please see A&P for additional details of medical decision making.      Data     I have personally reviewed the following data over the past 24 hrs:    9.6  \   11.2 (L)   / 175     142 106 28.2 (H) /  119 (H)   4.0 28 0.92 \     Trop: 21 BNP: N/A     TSH: 2.60 T4: N/A A1C: N/A       Imaging results reviewed over the past 24 hrs:   Recent Results (from the past 24 hours)   CT Head w/o Contrast    Narrative    EXAM: CT HEAD W/O CONTRAST  LOCATION: [facility]  DATE: 4/18/2025    INDICATION: fall. Injury. Pain.  COMPARISON: None.  TECHNIQUE: Routine CT Head without IV contrast. Multiplanar reformats. Dose reduction techniques were used.    FINDINGS:  INTRACRANIAL CONTENTS: No intracranial hemorrhage, extraaxial collection, or mass effect.  No CT evidence of acute infarct. Moderate presumed chronic small vessel ischemic changes. Chronic lacunar infarct in the left subinsular white matter. Moderate   generalized volume loss. No hydrocephalus. Skull base vascular calcifications.    VISUALIZED ORBITS/SINUSES/MASTOIDS: No intraorbital abnormality. No paranasal sinus mucosal disease. No middle ear or mastoid effusion.    BONES/SOFT TISSUES: No acute abnormality.      Impression    IMPRESSION:  1.  No CT evidence for acute intracranial process. Brain atrophy and presumed chronic microvascular ischemic changes as above.     CT Cervical Spine w/o Contrast    Narrative    EXAM: CT CERVICAL  SPINE W/O CONTRAST  LOCATION: [facility]  DATE: 4/18/2025    INDICATION: fall. Injury. Pain.  COMPARISON: None.  TECHNIQUE: Routine CT Cervical Spine without IV contrast. Multiplanar reformats. Dose reduction techniques were used.    FINDINGS:  VERTEBRA: Normal vertebral body heights and alignment. No fracture or posttraumatic subluxation.     CANAL/FORAMINA: No evidence for significant central canal stenosis at any of the cervical levels. Mild to moderate foraminal stenosis at multiple levels due to hypertrophic osteophytic spurring. Significant facet arthropathy with hypertrophic changes and   ankylosis at multiple areas.    PARASPINAL: Centrilobular emphysema and the upper lungs.      Impression    IMPRESSION:  1.  No fracture or posttraumatic subluxation.  2.  No high-grade spinal canal or neural foraminal stenosis.   XR Thoracic Spine 3 Views    Narrative    EXAM: XR THORACIC SPINE 3 VIEWS, XR LUMBAR SPINE 2/3 VIEWS  LOCATION: [facility]  DATE: 4/18/2025    INDICATION: back pain  COMPARISON: None.      Impression    IMPRESSION:   Thoracic spine: Somewhat limited evaluation due to osteopenia and overlapping tissue. Alignment is within normal limits. No spondylolisthesis. No findings to suggest an acute fracture. Normal disc spaces for age. Multilevel endplate osteophyte formation.   Median sternotomy and CABG.    Lumbar spine: Somewhat limited evaluation due to osteopenia and overlapping tissue. Alignment is within normal limits. No spondylolisthesis. No findings to suggest an acute fracture. Mild multilevel disc space height loss. Associated endplate osteophyte   formation and facet arthropathy. Aortoiliac stent.    XR Lumbar Spine 2/3 Views    Narrative    EXAM: XR THORACIC SPINE 3 VIEWS, XR LUMBAR SPINE 2/3 VIEWS  LOCATION: [facility]  DATE: 4/18/2025    INDICATION: back pain  COMPARISON: None.      Impression    IMPRESSION:   Thoracic  spine: Somewhat limited evaluation due to osteopenia and overlapping tissue. Alignment is within normal limits. No spondylolisthesis. No findings to suggest an acute fracture. Normal disc spaces for age. Multilevel endplate osteophyte formation.   Median sternotomy and CABG.    Lumbar spine: Somewhat limited evaluation due to osteopenia and overlapping tissue. Alignment is within normal limits. No spondylolisthesis. No findings to suggest an acute fracture. Mild multilevel disc space height loss. Associated endplate osteophyte   formation and facet arthropathy. Aortoiliac stent.

## 2025-04-19 ENCOUNTER — APPOINTMENT (OUTPATIENT)
Dept: ULTRASOUND IMAGING | Facility: HOSPITAL | Age: OVER 89
DRG: 301 | End: 2025-04-19
Attending: STUDENT IN AN ORGANIZED HEALTH CARE EDUCATION/TRAINING PROGRAM
Payer: COMMERCIAL

## 2025-04-19 ENCOUNTER — APPOINTMENT (OUTPATIENT)
Dept: ULTRASOUND IMAGING | Facility: HOSPITAL | Age: OVER 89
DRG: 301 | End: 2025-04-19
Payer: COMMERCIAL

## 2025-04-19 ENCOUNTER — APPOINTMENT (OUTPATIENT)
Dept: PHYSICAL THERAPY | Facility: HOSPITAL | Age: OVER 89
DRG: 301 | End: 2025-04-19
Attending: STUDENT IN AN ORGANIZED HEALTH CARE EDUCATION/TRAINING PROGRAM
Payer: COMMERCIAL

## 2025-04-19 ENCOUNTER — APPOINTMENT (OUTPATIENT)
Dept: OCCUPATIONAL THERAPY | Facility: HOSPITAL | Age: OVER 89
DRG: 301 | End: 2025-04-19
Attending: STUDENT IN AN ORGANIZED HEALTH CARE EDUCATION/TRAINING PROGRAM
Payer: COMMERCIAL

## 2025-04-19 LAB
ANION GAP SERPL CALCULATED.3IONS-SCNC: 5 MMOL/L (ref 7–15)
BUN SERPL-MCNC: 23.9 MG/DL (ref 8–23)
CALCIUM SERPL-MCNC: 8.4 MG/DL (ref 8.8–10.4)
CHLORIDE SERPL-SCNC: 104 MMOL/L (ref 98–107)
CK SERPL-CCNC: 1636 U/L (ref 39–308)
CK SERPL-CCNC: 2273 U/L (ref 39–308)
CREAT SERPL-MCNC: 1.02 MG/DL (ref 0.67–1.17)
EGFRCR SERPLBLD CKD-EPI 2021: 69 ML/MIN/1.73M2
ERYTHROCYTE [DISTWIDTH] IN BLOOD BY AUTOMATED COUNT: 14.4 % (ref 10–15)
GLUCOSE SERPL-MCNC: 95 MG/DL (ref 70–99)
HCO3 SERPL-SCNC: 31 MMOL/L (ref 22–29)
HCT VFR BLD AUTO: 33.5 % (ref 40–53)
HGB BLD-MCNC: 10.5 G/DL (ref 13.3–17.7)
MCH RBC QN AUTO: 26.4 PG (ref 26.5–33)
MCHC RBC AUTO-ENTMCNC: 31.3 G/DL (ref 31.5–36.5)
MCV RBC AUTO: 84 FL (ref 78–100)
PLATELET # BLD AUTO: 152 10E3/UL (ref 150–450)
POTASSIUM SERPL-SCNC: 4 MMOL/L (ref 3.4–5.3)
RBC # BLD AUTO: 3.97 10E6/UL (ref 4.4–5.9)
SODIUM SERPL-SCNC: 140 MMOL/L (ref 135–145)
WBC # BLD AUTO: 7.7 10E3/UL (ref 4–11)

## 2025-04-19 PROCEDURE — 258N000003 HC RX IP 258 OP 636

## 2025-04-19 PROCEDURE — 36415 COLL VENOUS BLD VENIPUNCTURE: CPT

## 2025-04-19 PROCEDURE — 85027 COMPLETE CBC AUTOMATED: CPT

## 2025-04-19 PROCEDURE — G0378 HOSPITAL OBSERVATION PER HR: HCPCS

## 2025-04-19 PROCEDURE — 80048 BASIC METABOLIC PNL TOTAL CA: CPT

## 2025-04-19 PROCEDURE — 99222 1ST HOSP IP/OBS MODERATE 55: CPT | Mod: GC

## 2025-04-19 PROCEDURE — 97161 PT EVAL LOW COMPLEX 20 MIN: CPT | Mod: GP

## 2025-04-19 PROCEDURE — 97165 OT EVAL LOW COMPLEX 30 MIN: CPT | Mod: GO

## 2025-04-19 PROCEDURE — 250N000013 HC RX MED GY IP 250 OP 250 PS 637

## 2025-04-19 PROCEDURE — 93971 EXTREMITY STUDY: CPT | Mod: LT

## 2025-04-19 PROCEDURE — 97535 SELF CARE MNGMENT TRAINING: CPT | Mod: GO

## 2025-04-19 PROCEDURE — 120N000001 HC R&B MED SURG/OB

## 2025-04-19 PROCEDURE — 97116 GAIT TRAINING THERAPY: CPT | Mod: GP

## 2025-04-19 PROCEDURE — 93922 UPR/L XTREMITY ART 2 LEVELS: CPT | Mod: 52

## 2025-04-19 PROCEDURE — 93925 LOWER EXTREMITY STUDY: CPT

## 2025-04-19 PROCEDURE — 82550 ASSAY OF CK (CPK): CPT

## 2025-04-19 RX ORDER — NALOXONE HYDROCHLORIDE 0.4 MG/ML
0.2 INJECTION, SOLUTION INTRAMUSCULAR; INTRAVENOUS; SUBCUTANEOUS
Status: DISCONTINUED | OUTPATIENT
Start: 2025-04-19 | End: 2025-04-21 | Stop reason: HOSPADM

## 2025-04-19 RX ORDER — NALOXONE HYDROCHLORIDE 0.4 MG/ML
0.4 INJECTION, SOLUTION INTRAMUSCULAR; INTRAVENOUS; SUBCUTANEOUS
Status: DISCONTINUED | OUTPATIENT
Start: 2025-04-19 | End: 2025-04-21 | Stop reason: HOSPADM

## 2025-04-19 RX ORDER — SODIUM CHLORIDE 9 MG/ML
INJECTION, SOLUTION INTRAVENOUS CONTINUOUS
Status: DISCONTINUED | OUTPATIENT
Start: 2025-04-19 | End: 2025-04-20

## 2025-04-19 RX ADMIN — DOXAZOSIN 8 MG: 4 TABLET ORAL at 21:12

## 2025-04-19 RX ADMIN — ACETAMINOPHEN 975 MG: 325 TABLET, FILM COATED ORAL at 13:54

## 2025-04-19 RX ADMIN — ACETAMINOPHEN 975 MG: 325 TABLET, FILM COATED ORAL at 21:01

## 2025-04-19 RX ADMIN — HYDROCHLOROTHIAZIDE 12.5 MG: 12.5 TABLET ORAL at 21:11

## 2025-04-19 RX ADMIN — SENNOSIDES AND DOCUSATE SODIUM 1 TABLET: 50; 8.6 TABLET ORAL at 21:00

## 2025-04-19 RX ADMIN — ASPIRIN 81 MG: 81 TABLET, COATED ORAL at 21:01

## 2025-04-19 RX ADMIN — OXYCODONE HYDROCHLORIDE 5 MG: 5 TABLET ORAL at 21:01

## 2025-04-19 RX ADMIN — ACETAMINOPHEN 975 MG: 325 TABLET, FILM COATED ORAL at 08:22

## 2025-04-19 RX ADMIN — CYANOCOBALAMIN TAB 250 MCG 250 MCG: 250 TAB at 21:01

## 2025-04-19 RX ADMIN — OXYCODONE HYDROCHLORIDE 5 MG: 5 TABLET ORAL at 04:25

## 2025-04-19 RX ADMIN — Medication 25 MCG: at 21:00

## 2025-04-19 RX ADMIN — SODIUM CHLORIDE: 0.9 INJECTION, SOLUTION INTRAVENOUS at 10:31

## 2025-04-19 RX ADMIN — OXYCODONE HYDROCHLORIDE 5 MG: 5 TABLET ORAL at 10:06

## 2025-04-19 RX ADMIN — ATORVASTATIN CALCIUM 40 MG: 40 TABLET, FILM COATED ORAL at 21:00

## 2025-04-19 RX ADMIN — APIXABAN 10 MG: 5 TABLET, FILM COATED ORAL at 08:22

## 2025-04-19 RX ADMIN — Medication 25 MCG: at 08:22

## 2025-04-19 RX ADMIN — SENNOSIDES AND DOCUSATE SODIUM 1 TABLET: 50; 8.6 TABLET ORAL at 08:22

## 2025-04-19 RX ADMIN — APIXABAN 10 MG: 5 TABLET, FILM COATED ORAL at 21:11

## 2025-04-19 ASSESSMENT — ACTIVITIES OF DAILY LIVING (ADL)
WALKING_OR_CLIMBING_STAIRS: TRANSFERRING DIFFICULTY, REQUIRES EQUIPMENT;STAIR CLIMBING DIFFICULTY, REQUIRES EQUIPMENT;AMBULATION DIFFICULTY, REQUIRES EQUIPMENT
EQUIPMENT_CURRENTLY_USED_AT_HOME: CANE, STRAIGHT;WALKER, STANDARD
ADLS_ACUITY_SCORE: 48
DEPENDENT_IADLS:: TRANSPORTATION
WEAR_GLASSES_OR_BLIND: NO
ADLS_ACUITY_SCORE: 47
ADLS_ACUITY_SCORE: 48
ADLS_ACUITY_SCORE: 48
ADLS_ACUITY_SCORE: 47
ADLS_ACUITY_SCORE: 47
ADLS_ACUITY_SCORE: 48
HEARING_DIFFICULTY_OR_DEAF: NO
ADLS_ACUITY_SCORE: 47
NUMBER_OF_TIMES_PATIENT_HAS_FALLEN_WITHIN_LAST_SIX_MONTHS: 2
ADLS_ACUITY_SCORE: 48
DOING_ERRANDS_INDEPENDENTLY_DIFFICULTY: NO
ADLS_ACUITY_SCORE: 47
WALKING_OR_CLIMBING_STAIRS_DIFFICULTY: YES
CONCENTRATING,_REMEMBERING_OR_MAKING_DECISIONS_DIFFICULTY: NO
DIFFICULTY_EATING/SWALLOWING: NO
ADLS_ACUITY_SCORE: 48
ADLS_ACUITY_SCORE: 48
TOILETING_ISSUES: YES
TOILETING_ASSISTANCE: TOILETING DIFFICULTY, ASSISTANCE 1 PERSON
DRESSING/BATHING_DIFFICULTY: YES
FALL_HISTORY_WITHIN_LAST_SIX_MONTHS: YES
DIFFICULTY_COMMUNICATING: NO
CHANGE_IN_FUNCTIONAL_STATUS_SINCE_ONSET_OF_CURRENT_ILLNESS/INJURY: YES
ADLS_ACUITY_SCORE: 48
ADLS_ACUITY_SCORE: 47
ADLS_ACUITY_SCORE: 48
ADLS_ACUITY_SCORE: 47
DRESSING/BATHING: BATHING DIFFICULTY, ASSISTANCE 1 PERSON;DRESSING DIFFICULTY, ASSISTANCE 1 PERSON
ADLS_ACUITY_SCORE: 47
ADLS_ACUITY_SCORE: 48
ADLS_ACUITY_SCORE: 47

## 2025-04-19 NOTE — PLAN OF CARE
PRIMARY DIAGNOSIS: DVT  OUTPATIENT/OBSERVATION GOALS TO BE MET BEFORE DISCHARGE:  1. Anticoagulant treatment initiated: Yes; Eliquis    2. Diagnostic testing complete (if applicable): Yes    3. Adequate home care or support arranged for LMWH administration: N/A    4. Return to near baseline physical activity:  Assist of one OOB, pt at baseline lives alone but has been having frequent falls.    5. Pain Status: Improved-controlled with oral pain medications.    Discharge Planner Nurse   Safe discharge environment identified:  may need higher level of care.  Barriers to discharge: Yes       Entered by: Lexie Elena RN 04/19/2025 6:39 AM   Lexie Elena RN    Please review provider order for any additional goals.   Nurse to notify provider when observation goals have been met and patient is ready for discharge.Goal Outcome Evaluation:

## 2025-04-19 NOTE — PLAN OF CARE
"PRIMARY DIAGNOSIS: \"GENERIC\" NURSING  OUTPATIENT/OBSERVATION GOALS TO BE MET BEFORE DISCHARGE:  ADLs back to baseline: No    Activity and level of assistance: ax1 w/gb & walker    Pain status: Improved-controlled with oral pain medications.    Return to near baseline physical activity: No     Discharge Planner Nurse   Safe discharge environment identified: No  Barriers to discharge: Yes       Entered by: Gurpreet Baker RN 04/18/2025 7:48 PM     Please review provider order for any additional goals.   Nurse to notify provider when observation goals have been met and patient is ready for discharge.  "

## 2025-04-19 NOTE — PROGRESS NOTES
Brief progress note 9:48 PM    Paged by RN for irregularity on telemetry.  Vitals otherwise normal, remains hypertensive 180/76.      Reviewed the strip, around 7 PM it was noted that he had irregularities in his R-R intervals and possibly less P waves.  On my review, had P waves earlier but during this short section did not definitely see P waves.  Did have some PACs intermixed as well.  Importantly, no dropped beats, just persistent first-degree AV block.  Telemetry strip now improved and regular.    Possible that there could have been a brief stint of paroxysmal A-fib, if recurrent could possibly be related to his falls.  With the pattern though it also appeared like it could be consistent with respiratory variation in R-R interval, though would expect more clear P waves with this.    Will continue to monitor overnight, may benefit from zio patch at discharge    Oren Lamb MD  PGY-1 [facility]

## 2025-04-19 NOTE — PLAN OF CARE
"PRIMARY DIAGNOSIS: \"GENERIC\" NURSING  OUTPATIENT/OBSERVATION GOALS TO BE MET BEFORE DISCHARGE:  ADLs back to baseline: Yes    Activity and level of assistance: ax1 w/gb & walker    Pain status: Improved-controlled with oral pain medications.    Return to near baseline physical activity: Yes     Discharge Planner Nurse   Safe discharge environment identified: No  Barriers to discharge: Yes       Entered by: Gurpreet Baker RN 04/19/2025 11:56 AM     Please review provider order for any additional goals.   Nurse to notify provider when observation goals have been met and patient is ready for discharge.  "

## 2025-04-19 NOTE — PLAN OF CARE
[facility] [id number]    Problem: Adult Inpatient Plan of Care  Goal: Absence of Hospital-Acquired Illness or Injury  Intervention: Identify and Manage Fall Risk  Recent Flowsheet Documentation  Taken 4/19/2025 0815 by Gurpreet Baker RN  Safety Promotion/Fall Prevention:   activity supervised   clutter free environment maintained   increased rounding and observation   increase visualization of patient   mobility aid in reach   nonskid shoes/slippers when out of bed   room near nurse's station   safety round/check completed     Problem: Delirium  Goal: Improved Behavioral Control  Intervention: Minimize Safety Risk  Recent Flowsheet Documentation  Taken 4/19/2025 0815 by Gurpreet Baker RN  Enhanced Safety Measures: review medications for side effects with activity  Trust Relationship/Rapport:   care explained   questions answered   reassurance provided   thoughts/feelings acknowledged    Problem: Delirium  Goal: Improved Attention and Thought Clarity  Intervention: Maximize Cognitive Function  Recent Flowsheet Documentation  Taken 4/19/2025 0815 by Gurpreet Baker RN  Sensory Stimulation Regulation:   care clustered   auditory stimulation minimized   quiet environment promoted  Reorientation Measures:   calendar in view   clock in view     Problem: Pain Acute  Goal: Optimal Pain Control and Function  4/19/2025 0941 by Gurpreet Baker RN  Outcome: Progressing  Intervention: Prevent or Manage Pain  Recent Flowsheet Documentation  Taken 4/19/2025 0815 by Gurpreet Baker RN  Sensory Stimulation Regulation:   care clustered   auditory stimulation minimized   quiet environment promoted  Medication Review/Management: medications reviewed    Problem: Comorbidity Management  Goal: Blood Pressure in Desired Range  4/19/2025 0941 by Gurpreet Baker RN  Outcome: Progressing  Intervention: Maintain Blood Pressure Management  Recent Flowsheet Documentation  Taken 4/19/2025 0815 by Gurpreet Baker RN  Medication Review/Management:  medications reviewed    Goal Outcome Evaluation:      Plan of Care Reviewed With: patient      A/o to self & place. RA. CM/OT/PT following. Pt reports back pain (chronic). On tele-afib. L AC SL. Up ax1 w/ walker & gb to bathroom. Strict I&O. Continue poc.

## 2025-04-19 NOTE — PROGRESS NOTES
Physical Therapy        04/19/25 1440   Appointment Info   Signing Clinician's Name / Credentials (PT) Clair Ambrosio, PT, DPT   Living Environment   People in Home alone   Current Living Arrangements independent living facility   Home Accessibility no concerns   Transportation Anticipated car, drives self   Self-Care   Equipment Currently Used at Home cane, straight;walker, standard   Activity/Exercise/Self-Care Comment Per pt, he uses 4WW at home. Pt is independent with all ADLs and IADLs. Pt drives and prepares his own meals   General Information   Onset of Illness/Injury or Date of Surgery 04/18/25   Referring Physician Anette Johnson   Patient/Family Therapy Goals Statement (PT) go home   Pertinent History of Current Problem (include personal factors and/or comorbidities that impact the POC) 89 year old male admitted on 4/18/2025. He has a history of AAA s/p stent graft repair (2011), CAD s/p CABG (~2009), hypertension, HLD, BPH, osteopenia, PVD who presented with recurrent falls at home, subsequently found to have elevated CK and left lower extremity DVT.   Existing Precautions/Restrictions fall   Cognition   Cognitive Status Comments some confusion, but grossly oriented and able to follow instructions   Pain Assessment   Patient Currently in Pain No   Integumentary/Edema   Integumentary/Edema no deficits were identifed   Posture    Posture Forward head position;Protracted shoulders   Range of Motion (ROM)   Range of Motion ROM is WFL   Strength (Manual Muscle Testing)   Strength (Manual Muscle Testing) Deficits observed during functional mobility   Bed Mobility   Comment, (Bed Mobility) pt seated EOB upon arrival of PT   Transfers   Comment, (Transfers) CGA sit to stand   Gait/Stairs (Locomotion)   Comment, (Gait/Stairs) 15' with 4WW, CGA, flexed posture and walker too far ahead of pt   Balance   Balance Comments mild impairment   Sensory Examination   Sensory Perception patient reports no sensory  changes   Coordination   Coordination no deficits were identified   Muscle Tone   Muscle Tone no deficits were identified   Clinical Impression   Criteria for Skilled Therapeutic Intervention Yes, treatment indicated   PT Diagnosis (PT) difficulty walking   Influenced by the following impairments decreased strength, balance, and safety awareness   Functional limitations due to impairments limited household mobility   Clinical Presentation (PT Evaluation Complexity) stable   Clinical Presentation Rationale presents as medically diagnosed   Clinical Decision Making (Complexity) low complexity   Planned Therapy Interventions (PT) balance training;bed mobility training;gait training;home exercise program;neuromuscular re-education;patient/family education;ROM (range of motion);stair training;strengthening;transfer training;progressive activity/exercise   Risk & Benefits of therapy have been explained evaluation/treatment results reviewed;current/potential barriers reviewed;participants voiced agreement with care plan;participants included;patient   PT Total Evaluation Time   PT Eval, Low Complexity Minutes (08770) 10   Physical Therapy Goals   PT Frequency 6x/week   PT Predicted Duration/Target Date for Goal Attainment 04/26/25   PT Goals Transfers;Gait   PT: Transfers Modified independent;Sit to/from stand   PT: Gait Modified independent;150 feet;Rolling walker   Interventions   Interventions Quick Adds Gait Training;Therapeutic Activity   Therapeutic Activity   Therapeutic Activities: dynamic activities to improve functional performance Minutes (98306) 3   Treatment Detail/Skilled Intervention sit<>stand with 4WW, cues for reaching destination and hand placement   Gait Training   Gait Training Minutes (29744) 12   Symptoms Noted During/After Treatment (Gait Training) fatigue   Treatment Detail/Skilled Intervention Cues for walker proximity and posture   Distance in Feet 285   [place] Level (Gait Training)  stand-by assist   Physical Assistance Level (Gait Training) 1 person assist   Assistive Device (Gait Training) rolling walker  (4WW)   Pattern Analysis (Gait Training) swing-through gait   Gait Analysis Deviations decreased cesar;decreased step length;decreased toe-to-floor clearance   PT Discharge Planning   PT Plan bring 4WW for GT, transfer training, standing ex   PT Discharge Recommendation (DC Rec) home with home care physical therapy   PT Rationale for DC Rec near baseline   PT Brief overview of current status walking with SBA   PT Total Distance Amb During Session (feet) 300   Physical Therapy Time and Intention   Timed Code Treatment Minutes 15   Total Session Time (sum of timed and untimed services) 25       Clair Ambrosio, LUNA 4/19/2025

## 2025-04-19 NOTE — PROGRESS NOTES
04/19/25 1400   Appointment Info   Signing Clinician's Name / Credentials (OT) Katya FLOOD jessica Hoffchaparro HEMPHILL/L   Living Environment   People in Home alone   Current Living Arrangements independent living facility   Home Accessibility no concerns   Transportation Anticipated car, drives self   Living Environment Comments Pt lives in apt with grab rails   Self-Care   Current Activity Tolerance moderate   Equipment Currently Used at Home walker, rolling   Fall history within last six months yes   Number of times patient has fallen within last six months 2   Activity/Exercise/Self-Care Comment Per pt, he uses 4WW at home.  Pt is independent with all ADLs and IADLs.  Pt drives.   General Information   Onset of Illness/Injury or Date of Surgery 04/18/25   Referring Physician Lorenza   Patient/Family Therapy Goal Statement (OT) home   Additional Occupational Profile Info/Pertinent History of Current Problem Pt admitted after falls, back pain   Existing Precautions/Restrictions fall   Cognitive Status Examination   Orientation Status person;time   Cognitive Status Comments Pt knows he is in hospital but did not know name of hospital.  Pt follows most directions.  Pt is a bit confused at times.  Initiated SLUMS but pt was not wanting to participate.  Will attempt again tomorrow   Visual Perception   Visual Impairment/Limitations WFL   Sensory   Sensory Comments UE's intact   Range of Motion Comprehensive   Comment, General Range of Motion WFLs for ADLs   Strength Comprehensive (MMT)   Comment, General Manual Muscle Testing (MMT) Assessment WFLs for ADLs   Coordination   Upper Extremity Coordination No deficits were identified   Bed Mobility   Comment (Bed Mobility) SBA   Transfers   Transfer Comments CGA   Balance   Balance Comments CGA with FWW   Activities of Daily Living   BADL Assessment/Intervention lower body dressing   Lower Body Dressing Assessment/Training   Comment, (Lower Body Dressing) SBA donning and doffing  socks   Clinical Impression   Criteria for Skilled Therapeutic Interventions Met (OT) Yes, treatment indicated   OT Diagnosis Impaired ADL independence   OT Problem List-Impairments impacting ADL balance;cognition;mobility   Assessment of Occupational Performance 1-3 Performance Deficits   Planned Therapy Interventions (OT) ADL retraining;balance training;cognition;transfer training   Clinical Decision Making Complexity (OT) problem focused assessment/low complexity   Risk & Benefits of therapy have been explained evaluation/treatment results reviewed;participants included;patient   Clinical Impression Comments Pt seen bedside for OT eval and treatment.  Pt demonstrates impaired independence with ADLs and and mobility.  Recommend further cogntiive assessment as pt drives and manages IADLs.  OT to address.  Recommend home with home OT.  Pt may benefit from driving eval if he is indeed still driving.   OT Total Evaluation Time   OT Eval, Low Complexity Minutes (51935) 10   OT Goals   Therapy Frequency (OT) 5 times/week   OT Predicted Duration/Target Date for Goal Attainment 04/26/25   OT Goals Hygiene/Grooming;Lower Body Dressing;Transfers;Toilet Transfer/Toileting;OT Goal 1   OT: Hygiene/Grooming supervision/stand-by assist   OT: Lower Body Dressing Supervision/stand-by assist   OT: Transfer Supervision/stand-by assist;with assistive device   OT: Toilet Transfer/Toileting Supervision/stand-by assist   OT: Goal 1 Pt will participate in formal cognitive assessment to A with discharge planning   OT Discharge Planning   OT Plan SLUMS dressing with shorts, standing G/H, walker safety   OT Discharge Recommendation (DC Rec) home with home care occupational therapy   OT Rationale for DC Rec Recommend home with home OT as pt likely close to baseline but would benefit from home safety eval to assess higher level ADLs and IADLs in the home.  Pt may also benefit from driving assessment.  Will continue to advise based on cog  assessment.   OT Brief overview of current status CGA                                                                                   [facility]      OUTPATIENT OCCUPATIONAL THERAPY  EVALUATION  PLAN OF TREATMENT FOR OUTPATIENT REHABILITATION  (COMPLETE FOR INITIAL CLAIMS ONLY)  Patient's Last Name, First Name, [email or web address].  YOB: 1932  Angelica Maharaj                             Provider's Name  [facility] Medical Record No.  [id number]                               Onset Date:  04/18/25   Start of Care Date:        Type:     ___PT   _X_OT   ___SLP Medical Diagnosis:                           OT Diagnosis:  (P) Impaired ADL independence   Visits from SOC:  1   _________________________________________________________________________________  Plan of Treatment/Functional Goals    Planned Interventions: (P) ADL retraining, balance training, cognition, transfer training   Goals: See Occupational Therapy Goals on Care Plan in [employer] electronic health record.    Therapy Frequency: (P) 5 times/week  Predicted Duration of Therapy Intervention: (P) 04/26/25  _________________________________________________________________________________    I CERTIFY THE NEED FOR THESE SERVICES FURNISHED UNDER        THIS PLAN OF TREATMENT AND WHILE UNDER MY CARE .             Physician Signature               Date    X_____________________________________________________                   ,      Referring Physician: Britt (P)            Initial Assessment        See Occupational Therapy evaluation dated   in Epic electronic health record.

## 2025-04-19 NOTE — PLAN OF CARE
PRIMARY DIAGNOSIS: GENERALIZED WEAKNESS    OUTPATIENT/OBSERVATION GOALS TO BE MET BEFORE DISCHARGE  1. Orthostatic performed: No    2. Tolerating PO medications: Yes    3. Return to near baseline physical activity:  Pt Assist of one with activity OOB, unsteady.    4. Cleared for discharge by consultants (if involved): No    Discharge Planner Nurse   Safe discharge environment identified:  may need higher level of care as pt lives alone currently.  Barriers to discharge: Yes       Entered by: Lexie Elena RN 04/19/2025 6:37 AM   Lexie Elena RN    Please review provider order for any additional goals.   Nurse to notify provider when observation goals have been met and patient is ready for discharge.Goal Outcome Evaluation:

## 2025-04-19 NOTE — PROGRESS NOTES
[facility]    Progress Note - Hospitalist Service       Date of Admission:  4/18/2025    Assessment & Plan   Angelica Maharaj is a 89 year old male admitted on 4/18/2025. He has a history of AAA s/p stent graft repair (2011), CAD s/p CABG (~2009), hypertension, HLD, BPH, osteopenia, PVD who presented with recurrent falls at home, subsequently found to have elevated CK and left lower extremity DVT.     LLE DVT  PAD  History of PVD with possible bypass in 2023, now with LLE DVT. MARISA's of bilateral lower extremities showing moderate arterial disease on the right but severe disease on the left.   - Continue eliquis 10 mg x 7 days, followed by 5 mg thereafter   - consider vascular consult     Recurrent Falls  Concern for cognitive dysfunction  Unclear etiology of his falls, though from the history he cannot remember appears most likely mechanical versus orthostatic hypotension. Especially worry it could be related to his chronic back pain/opiate use as below. No evidence of arrhythmia so far.  Unclear how long he was down, though was able to call his niece and an ambulance.  Workup reassuring with no ICH or vertebral fractures, labs largely normal. Vitals also reassuring, intermittently hypertensive though asymptomatic. Does report using walker and wheelchair at home which suggests history of gait difficulties, though history is unclear.  With his independent living status and current situation, he is very high risk for falls and recurrent falls, particularly with what appears to be some level of cognitive dysfunction in last 6 months, noted here as well.  Needs a safe disposition plan, suspect that he may need a higher level of care.  - Telemetry  - Orthostatic vitals ordered  - Delirium precautions  - PT/OT consulted, please complete SLUMS eval  - Care management for dispo planning      Elevated CK  CK elevated to 1484, reassured that there is no evidence of kidney dysfunction. Electrolytes  otherwise normal, does not appear to be florid rhabdo at this time, though does suggest prolonged time down at home.   - NS @ 125mL/hr     Chronic back pain  Osteopenia  Managed long-term with chronic opiates, not reporting any recent changes or changes after the fall today though is having 8-9/10 pain.  Tender in the paraspinal muscles bilateral thoracic area.  No tenderness along spinous processes reassuring, along with no radiographic evidence of no fracture.   - Held PTA norco  - Scheduled tylenol 975 mg TID  - PRN oxycodone  - PT/OT     Normocytic Anemia  Mild, hgb 11.2 on admission. Will continue to monitor.   - Daily CBC      Abdominal Tenderness  Constipation  Has been a couple days since last BM, normally daily, with some mild tenderness and distention though not an acute abdomen.   - Scheduled senna-docusate        Chronic conditions:  BPH - PTA doxazosin      HTN - PTA hydrochlorothiazide and metoprolol      AAA s/p stent graft repair (2011)  CAD s/p CABG  - PTA ASA 81     HLD  - PTA atorvastatin 40 mg          Observation Goals: -diagnostic tests and consults completed and resulted, -vital signs normal or at patient baseline, -tolerating oral intake to maintain hydration, -adequate pain control on oral analgesics, -returns to baseline functional status, -safe disposition plan has been identified, Nurse to notify provider when observation goals have been met and patient is ready for discharge.  Diet: Regular Diet Adult    DVT Prophylaxis: DOAC  Boyd Catheter: Not present  Fluids: NS @ 125mL/hr  Lines: None     Cardiac Monitoring: ACTIVE order. Indication: Syncope- low cardiac risk (24 hours)  Code Status: Full Code      Clinically Significant Risk Factors Present on Admission                 # Drug Induced Platelet Defect: home medication list includes an antiplatelet medication   # Hypertension: Home medication list includes antihypertensive(s)      # Anemia: based on hgb <11       # Overweight:  "Estimated body mass index is 27.8 kg/m  as calculated from the following:    Height as of this encounter: 1.753 m (5' 9\").    Weight as of this encounter: 85.4 kg (188 lb 4.4 oz).              Social Drivers of Health   Tobacco Use: Medium Risk (4/11/2024)    Received from [facility]     Patient History     Smoking Tobacco Use: Former     Smokeless Tobacco Use: Former                The patient's care was discussed with the Attending Physician, Dr. Alex MD .    Justice Britt MD  [facility], PGY-2    ______________________________________________________________________    Interval History   Feeling well this morning. Endorsing chronic low back pain but not worse than usual. Leg is not painful.     Physical Exam   Vital Signs: Temp: 97.6  F (36.4  C) Temp src: Oral BP: (!) 152/68 Pulse: 52   Resp: 18 SpO2: 97 % O2 Device: None (Room air)    Weight: 188 lbs 4.37 oz    GEN: Pleasant male, in no acute distress.   HEENT: Normocephalic, atraumatic. Extraoccular eye movements intact. Anicteric sclera. Moist mucous membranes.   NECK: Supple. No cervical or supraclavicular adenopathy.   PULM: Non-labored breathing. No use of accessory muscles. Clear to ausculation bilaterally. No wheezes or crackles.   CV: Regular rate and rhythm. Normal S1, S2. No rubs, murmurs, or gallops.    ABDOMEN: Normoactive bowel sounds. Non-tender to palpation. Non-distended.    EXTREMITES:  LLE with 2+ pitting edema. Pedal pulses intact. Non-tender to palpation with symmetric sensation. No overlying erythema.   NEURO:  Awake. Oriented to person, place, time and situation. Cranial nerves 2-12 grossly intact. Moving all extremities.    PSYCH: Calm. Appropriate affect, insight, judgment.       Medical Decision Making             Data     I have personally reviewed the following data over the past 24 hrs:    7.7  \   10.5 (L)   / 152     140 104 23.9 (H) /  95   4.0 31 (H) 1.02 \     Trop: 21 BNP: N/A     TSH: " 2.60 T4: N/A A1C: N/A       Imaging results reviewed over the past 24 hrs:   Recent Results (from the past 24 hours)   CT Head w/o Contrast    Narrative    EXAM: CT HEAD W/O CONTRAST  LOCATION: [facility]  DATE: 4/18/2025    INDICATION: fall. Injury. Pain.  COMPARISON: None.  TECHNIQUE: Routine CT Head without IV contrast. Multiplanar reformats. Dose reduction techniques were used.    FINDINGS:  INTRACRANIAL CONTENTS: No intracranial hemorrhage, extraaxial collection, or mass effect.  No CT evidence of acute infarct. Moderate presumed chronic small vessel ischemic changes. Chronic lacunar infarct in the left subinsular white matter. Moderate   generalized volume loss. No hydrocephalus. Skull base vascular calcifications.    VISUALIZED ORBITS/SINUSES/MASTOIDS: No intraorbital abnormality. No paranasal sinus mucosal disease. No middle ear or mastoid effusion.    BONES/SOFT TISSUES: No acute abnormality.      Impression    IMPRESSION:  1.  No CT evidence for acute intracranial process. Brain atrophy and presumed chronic microvascular ischemic changes as above.     CT Cervical Spine w/o Contrast    Narrative    EXAM: CT CERVICAL SPINE W/O CONTRAST  LOCATION: [facility]  DATE: 4/18/2025    INDICATION: fall. Injury. Pain.  COMPARISON: None.  TECHNIQUE: Routine CT Cervical Spine without IV contrast. Multiplanar reformats. Dose reduction techniques were used.    FINDINGS:  VERTEBRA: Normal vertebral body heights and alignment. No fracture or posttraumatic subluxation.     CANAL/FORAMINA: No evidence for significant central canal stenosis at any of the cervical levels. Mild to moderate foraminal stenosis at multiple levels due to hypertrophic osteophytic spurring. Significant facet arthropathy with hypertrophic changes and   ankylosis at multiple areas.    PARASPINAL: Centrilobular emphysema and the upper lungs.      Impression    IMPRESSION:  1.  No fracture or posttraumatic  subluxation.  2.  No high-grade spinal canal or neural foraminal stenosis.   XR Thoracic Spine 3 Views    Narrative    EXAM: XR THORACIC SPINE 3 VIEWS, XR LUMBAR SPINE 2/3 VIEWS  LOCATION: [facility]  DATE: 4/18/2025    INDICATION: back pain  COMPARISON: None.      Impression    IMPRESSION:   Thoracic spine: Somewhat limited evaluation due to osteopenia and overlapping tissue. Alignment is within normal limits. No spondylolisthesis. No findings to suggest an acute fracture. Normal disc spaces for age. Multilevel endplate osteophyte formation.   Median sternotomy and CABG.    Lumbar spine: Somewhat limited evaluation due to osteopenia and overlapping tissue. Alignment is within normal limits. No spondylolisthesis. No findings to suggest an acute fracture. Mild multilevel disc space height loss. Associated endplate osteophyte   formation and facet arthropathy. Aortoiliac stent.    XR Lumbar Spine 2/3 Views    Narrative    EXAM: XR THORACIC SPINE 3 VIEWS, XR LUMBAR SPINE 2/3 VIEWS  LOCATION: [facility]  DATE: 4/18/2025    INDICATION: back pain  COMPARISON: None.      Impression    IMPRESSION:   Thoracic spine: Somewhat limited evaluation due to osteopenia and overlapping tissue. Alignment is within normal limits. No spondylolisthesis. No findings to suggest an acute fracture. Normal disc spaces for age. Multilevel endplate osteophyte formation.   Median sternotomy and CABG.    Lumbar spine: Somewhat limited evaluation due to osteopenia and overlapping tissue. Alignment is within normal limits. No spondylolisthesis. No findings to suggest an acute fracture. Mild multilevel disc space height loss. Associated endplate osteophyte   formation and facet arthropathy. Aortoiliac stent.    US Lower Extremity Venous Duplex Left    Narrative    EXAM: ULTRASOUND LOWER EXTREMITY VENOUS DUPLEX LEFT  LOCATION: [facility]  DATE: 04/19/2025    INDICATION:  Unilateral swelling with calf tenderness to palpation, rule out DVT.  COMPARISON: None.  TECHNIQUE: Venous Duplex ultrasound of the left lower extremity with and without compression, augmentation and duplex. Color flow and spectral Doppler with waveform analysis performed.    FINDINGS: Exam includes the common femoral, femoral, popliteal, and contralateral common femoral veins as well as segmentally visualized deep calf veins and greater saphenous vein.     LEFT: The common femoral, femoral, and popliteal veins are patent and negative for thrombus. Posterior tibial and peroneal veins where seen in the calf also negative for thrombus. However, there is a thrombosed noncompressible vein within the muscles of   the left calf likely a soleal vein compatible with DVT. No superficial thrombophlebitis. No popliteal cyst.      Impression    IMPRESSION:  1.  Exam positive for DVT in the left lower extremity with occlusive thrombus within a intramuscular vein in the left calf, likely a soleal vein.    2.  No evidence for DVT elsewhere in the left lower extremity.    3.  Findings called to Dr. De La Cruz on 04/19/2025, 4:34 AM CDT.       US MARISA Doppler No Exercise 1-2 Levels Left    Narrative    EXAM: RESTING ANKLE-BRACHIAL INDICES (ABIs)  LOCATION: [facility]  DATE: 04/19/2025    INDICATION: History of peripheral vascular disease, unilateral leg swelling.  COMPARISON: None.    MARISA FINDINGS:  RIGHT  Brachial: 173  Ankle (PT): 123 Index: 0.66  Ankle (DP): 131 Index: 0.71  Digit: 87 Index: 0.47    LEFT  Brachial: 185  Ankle (PT): 80 Index: 0.43  Ankle (DP): 63 Index: 0.34  Digit: 0 Index: 0.00    The right MARISA at rest is 0.71. The left MARISA at rest is 0.43.      WAVEFORMS: Bilateral dorsalis pedis and left posterior tibial arteries waveforms were unable to be obtained.       Impression    IMPRESSION:  1.  RIGHT LOWER EXTREMITY: MARISA at rest is 0.71 consistent with moderate arterial disease.  2.  LEFT LOWER  EXTREMITY: MARISA at rest is 0.43 consistent with severe arterial disease.       US Lower Extremity Arterial Duplex Bilateral    Narrative    EXAM: ULTRASOUND LOWER EXTREMITY ARTERIAL DUPLEX BILATERAL  LOCATION: [facility]  DATE: 04/19/2025    INDICATION: History of peripheral vascular disease, unilateral left leg swelling. Left calf tenderness.  COMPARISON: Ultrasound lower extremity arterial duplex bilateral 08/17/2023.  TECHNIQUE: Duplex utilizing 2D gray-scale imaging, Doppler interrogation with color-flow and spectral waveform analysis.    FINDINGS: Extensive calcific atherosclerotic changes involving bilateral lower extremity arteries.    RIGHT LOWER EXTREMITY ARTERIAL ASSESSMENT:  External iliac artery 305/12 cm/s  Common femoral artery: 229/16 cm/s  Profunda femoris artery: 203/31 cm/s  SFA (proximal): 140/12 cm/s  SFA (mid): Obscured due to calcified plaque  SFA (distal): Obscured due to calcified plaque  Popliteal artery: 56/8 cm/s  Posterior tibial artery: 54/10 cm/s  Anterior tibial artery: 11/4 cm/s  Dorsalis pedis artery: 9/3 cm/s    LEFT LOWER EXTREMITY ARTERIAL ASSESSMENT:  External iliac artery 21/5 cm/s  Common femoral artery: 15/5 cm/s  Profunda femoris artery: 43/8 cm/s  SFA (proximal): Obscured due to calcified plaque  SFA (mid): Obscured due to calcified plaque  SFA (distal): 14/4 cm/s  Popliteal artery: 31/10 cm/s  Posterior tibial artery: Obscured due to calcified plaque  Anterior tibial artery: Obscured due to calcified plaque  Dorsalis pedis artery: Obscured due to calcified plaque      Impression    IMPRESSION:  1.  Extensive atherosclerotic changes involving bilateral lower extremity arteries, worse on the left with diminished flow velocities.    2.  Multiple segments of bilateral lower extremity arteries are obscured due to calcified plaque. If there is continued clinical concern, consider dedicated bilateral lower extremity CT angiogram runoff study.

## 2025-04-19 NOTE — CONSULTS
Care Management Initial Consult    General Information  Assessment completed with: Patient, pt  Type of CM/SW Visit: Initial Assessment    Primary Care Provider verified and updated as needed: Yes   Readmission within the last 30 days: no previous admission in last 30 days      Reason for Consult: discharge planning, length of stay  Advance Care Planning: Advance Care Planning Reviewed: no concerns identified, verified with patient     General Information Comments: lives alone and ind at baseline for personal cares, will need WCtransportation at discharge, discussed cost w/pt and niece    Communication Assessment  Patient's communication style: spoken language (English or Bilingual)    Hearing Difficulty or Deaf: no        Cognitive  Cognitive/Neuro/Behavioral: level of consciousness, orientation  Level of Consciousness: intermittent confusion  Arousal Level: opens eyes spontaneously  Orientation: disoriented to, situation, time  Mood/Behavior: cooperative, calm     Speech: clear    Living Environment:   People in home: alone     Current living Arrangements: apartment      Able to return to prior arrangements: yes       Family/Social Support:  Care provided by: self  Provides care for: no one  Marital Status: Single  Support system: Other (specify) (sammy Duckworth [phone], CM has updated her on observation admission and transportation cost at discharge for WC ride)          Description of Support System: Supportive, Involved    Support Assessment: Adequate social supports    Current Resources:   Patient receiving home care services: No        Community Resources: None  Equipment currently used at home: cane, straight, walker, standard  Supplies currently used at home: None    Employment/Financial:  Employment Status:          Financial Concerns: none   Referral to Financial Worker: No       Does the patient's insurance plan have a 3 day qualifying hospital stay waiver?  No    Lifestyle & Psychosocial  Needs:  Social Drivers of Health     Food Insecurity: Low Risk  (4/18/2025)    Food Insecurity     Within the past 12 months, did you worry that your food would run out before you got money to buy more?: No     Within the past 12 months, did the food you bought just not last and you didn t have money to get more?: No   Depression: Not at risk (12/14/2023)    Received from [facility]     PHQ-2     PHQ2 Total: 0   Housing Stability: Low Risk  (4/18/2025)    Housing Stability     Do you have housing? : Yes     Are you worried about losing your housing?: No   Tobacco Use: Medium Risk (4/11/2024)    Received from [facility]     Patient History     Smoking Tobacco Use: Former     Smokeless Tobacco Use: Former     Passive Exposure: Not on file   Financial Resource Strain: Low Risk  (4/18/2025)    Financial Resource Strain     Within the past 12 months, have you or your family members you live with been unable to get utilities (heat, electricity) when it was really needed?: No   Alcohol Use: Not on file   Transportation Needs: Low Risk  (4/18/2025)    Transportation Needs     Within the past 12 months, has lack of transportation kept you from medical appointments, getting your medicines, non-medical meetings or appointments, work, or from getting things that you need?: No   Physical Activity: Not on file   Interpersonal Safety: Not on file   Stress: Not on file   Social Connections: Not on file   Health Literacy: Not on file       Functional Status:  Prior to admission patient needed assistance:      Dependent IADLs:: Transportation  Assesssment of Functional Status: Not at baseline with ADL Functioning    Mental Health Status:  Mental Health Status: No Current Concerns       Chemical Dependency Status:                Values/Beliefs:  Spiritual, Cultural Beliefs, Druze Practices, Values that affect care:                 Discussed  Partnership in Safe Discharge Planning  document with  patient/family: No    Additional Information:  lives alone and ind at baseline for personal cares, will need WC transportation at discharge, discussed cost w/pt and niece. Per niece Donita, she speaks to pt daily and a few times a day, she has no concerns about his cognition and ability to make decisions. Pt lives alone and independent w/personal cares     Next Steps: DVT  treatment and discharge planning, transportation    Abdi Farah RN

## 2025-04-19 NOTE — PLAN OF CARE
"PRIMARY DIAGNOSIS: \"GENERIC\" NURSING  OUTPATIENT/OBSERVATION GOALS TO BE MET BEFORE DISCHARGE:  ADLs back to baseline: Yes    Activity and level of assistance: ax1 w/gb & walker    Pain status: Improved-controlled with oral pain medications.    Return to near baseline physical activity: Yes     Discharge Planner Nurse   Safe discharge environment identified: No  Barriers to discharge: Yes       Entered by: Gurpreet Baker RN 04/19/2025 8:10 AM     Please review provider order for any additional goals.   Nurse to notify provider when observation goals have been met and patient is ready for discharge.  "

## 2025-04-19 NOTE — PLAN OF CARE
Patient admitted to room 10 at approximately 1900 via wheel chair from emergency room.  Reason for Admission: Falls  Report received from: ED notes  Patient was accompanied by Self.  Discharge transportation provided by:  Patient ambulated/transferred:  with one assist. self.  Patient is alert and orientated x 2 (self & situation)  Outpatient Observation education provided to: (patient, family, friend)  MDRO Education done if applicable (MRSA, VRE, etc)  Safety risks were identified during admission:  fall.   Yellow risk/fall band applied:  Yes  Home meds sent home: No  Home meds sent to pharmacy:No IF YES add 1/2 sheet laminated page reminder to chart/clipboard   Detailed Belongings: see aid's belonging notes

## 2025-04-19 NOTE — PLAN OF CARE
Problem: Comorbidity Management  Goal: Blood Pressure in Desired Range  Outcome: Progressing  Intervention: Maintain Blood Pressure Management  Recent Flowsheet Documentation  Taken 4/18/2025 2000 by Gurpreet Baker RN  Medication Review/Management: medications reviewed     Problem: Fall Injury Risk  Goal: Absence of Fall and Fall-Related Injury  Outcome: Progressing  Intervention: Identify and Manage Contributors  Recent Flowsheet Documentation  Taken 4/18/2025 2000 by Gurpreet Baker RN  Medication Review/Management: medications reviewed  Intervention: Promote Injury-Free Environment  Recent Flowsheet Documentation  Taken 4/18/2025 2000 by Gurpreet Baker RN  Safety Promotion/Fall Prevention:   mobility aid in reach   lighting adjusted   increased rounding and observation   nonskid shoes/slippers when out of bed   room near nurse's station   safety round/check completed     Problem: Pain Acute  Goal: Optimal Pain Control and Function  Outcome: Progressing  Intervention: Prevent or Manage Pain  Recent Flowsheet Documentation  Taken 4/18/2025 2000 by Gurpreet Baker RN  Sensory Stimulation Regulation:   lighting decreased   care clustered   auditory stimulation minimized  Medication Review/Management: medications reviewed     Problem: Delirium  Goal: Optimal Coping  4/18/2025 2041 by Gurpreet Baker RN  Outcome: Progressing  Goal: Improved Behavioral Control  4/18/2025 2041 by Gurpreet Baker RN  Outcome: Progressing  Intervention: Minimize Safety Risk  Recent Flowsheet Documentation  Taken 4/18/2025 2000 by Gurpreet Baker RN  Enhanced Safety Measures: pain management  Goal: Improved Attention and Thought Clarity  4/18/2025 2041 by Gurpreet Baker RN  Outcome: Progressing  Intervention: Maximize Cognitive Function  Recent Flowsheet Documentation  Taken 4/18/2025 2000 by Gurpreet Baker RN  Sensory Stimulation Regulation:   lighting decreased   care clustered   auditory stimulation minimized  Reorientation Measures:    calendar in view   clock in view  Goal: Improved Sleep  4/18/2025 2041 by Gurpreet Baker, RN  Outcome: Progressing    Goal Outcome Evaluation:      Plan of Care Reviewed With: patient      A/ox2 - self & place. RA. On tele-SR. Back pain (chronic) reported. 1x prn oxy given. CM/OT/PT consult. LLE swelling noted. Critical lab of CK - MD notified. 1x . 500ml LR bolus given. L AC SL. Needing LLE USU. Up ax1 w/gb & walker. Continue poc.

## 2025-04-19 NOTE — SIGNIFICANT EVENT
Significant Event Note    Time of event: 4:53 AM April 19, 2025    Description of event:  HO paged from bedside RN regarding call back from [place] radiology. DVT U/S demonstrating DVT in L soleal vein, received lovenox for DVT PPX on admission ~2000 4/18. Will hold lovenox and start eliquis 10 mg x7 days followed by 5 mg thereafter    Plan:  As above  -First dose eliquis to be given with AM medications    Discussed with: bedside nurse    Oren De La Cruz MD

## 2025-04-19 NOTE — PLAN OF CARE
Notified Resident at 0426 AM regarding radiology result.  We have a radiologist on the line wanting to speak with a doctor following this patient. What's a good call back number for you? Thanks!    Spoke with: Dr. De La Cruz    Orders were not obtained. Number provided and Radiologist is to call and talk to Dr. De La Cruz.

## 2025-04-19 NOTE — PLAN OF CARE
Problem: Adult Inpatient Plan of Care  Goal: Absence of Hospital-Acquired Illness or Injury  Intervention: Identify and Manage Fall Risk  Recent Flowsheet Documentation  Taken 4/19/2025 0815 by Gurpreet Baker RN  Safety Promotion/Fall Prevention:   activity supervised   clutter free environment maintained   increased rounding and observation   increase visualization of patient   mobility aid in reach   nonskid shoes/slippers when out of bed   room near nurse's station   safety round/check completed     Problem: Delirium  Goal: Improved Behavioral Control  Intervention: Minimize Safety Risk  Recent Flowsheet Documentation  Taken 4/19/2025 0815 by Gurpreet Baker RN  Enhanced Safety Measures: review medications for side effects with activity  Trust Relationship/Rapport:   care explained   questions answered   reassurance provided   thoughts/feelings acknowledged    Problem: Delirium  Goal: Improved Attention and Thought Clarity  Intervention: Maximize Cognitive Function  Recent Flowsheet Documentation  Taken 4/19/2025 0815 by Gurpreet Baker RN  Sensory Stimulation Regulation:   care clustered   auditory stimulation minimized   quiet environment promoted  Reorientation Measures:   calendar in view   clock in view     Problem: Pain Acute  Goal: Optimal Pain Control and Function  4/19/2025 0941 by Gurpreet Baker RN  Outcome: Progressing  Intervention: Prevent or Manage Pain  Recent Flowsheet Documentation  Taken 4/19/2025 0815 by Gurpreet Baker RN  Sensory Stimulation Regulation:   care clustered   auditory stimulation minimized   quiet environment promoted  Medication Review/Management: medications reviewed    Problem: Comorbidity Management  Goal: Blood Pressure in Desired Range  4/19/2025 0941 by Gurpreet Baker RN  Outcome: Progressing  Intervention: Maintain Blood Pressure Management  Recent Flowsheet Documentation  Taken 4/19/2025 0815 by Gurpreet Baker RN  Medication Review/Management: medications reviewed    Goal  Outcome Evaluation:      Plan of Care Reviewed With: patient      A/o to self & place. RA. CM/OT/PT following. Pt reports back pain (chronic). 1x prn oxy & scheduled tylernol given. US + for DVT in LLE. On tele-afib. L AC SL. Up ax1 w/ walker & gb to bathroom. Strict I&O. Continue poc.

## 2025-04-20 ENCOUNTER — APPOINTMENT (OUTPATIENT)
Dept: OCCUPATIONAL THERAPY | Facility: HOSPITAL | Age: OVER 89
DRG: 301 | End: 2025-04-20
Payer: COMMERCIAL

## 2025-04-20 LAB
ANION GAP SERPL CALCULATED.3IONS-SCNC: 9 MMOL/L (ref 7–15)
ATRIAL RATE - MUSE: 61 BPM
BUN SERPL-MCNC: 19.3 MG/DL (ref 8–23)
CALCIUM SERPL-MCNC: 8.3 MG/DL (ref 8.8–10.4)
CHLORIDE SERPL-SCNC: 105 MMOL/L (ref 98–107)
CREAT SERPL-MCNC: 0.9 MG/DL (ref 0.67–1.17)
CREAT SERPL-MCNC: 0.9 MG/DL (ref 0.67–1.17)
DIASTOLIC BLOOD PRESSURE - MUSE: NORMAL MMHG
EGFRCR SERPLBLD CKD-EPI 2021: 80 ML/MIN/1.73M2
EGFRCR SERPLBLD CKD-EPI 2021: 80 ML/MIN/1.73M2
ERYTHROCYTE [DISTWIDTH] IN BLOOD BY AUTOMATED COUNT: 14.5 % (ref 10–15)
GLUCOSE SERPL-MCNC: 105 MG/DL (ref 70–99)
HCO3 SERPL-SCNC: 25 MMOL/L (ref 22–29)
HCT VFR BLD AUTO: 35.4 % (ref 40–53)
HGB BLD-MCNC: 11.2 G/DL (ref 13.3–17.7)
INTERPRETATION ECG - MUSE: NORMAL
MCH RBC QN AUTO: 26.9 PG (ref 26.5–33)
MCHC RBC AUTO-ENTMCNC: 31.6 G/DL (ref 31.5–36.5)
MCV RBC AUTO: 85 FL (ref 78–100)
P AXIS - MUSE: NORMAL DEGREES
PLATELET # BLD AUTO: 164 10E3/UL (ref 150–450)
POTASSIUM SERPL-SCNC: 4 MMOL/L (ref 3.4–5.3)
PR INTERVAL - MUSE: NORMAL MS
QRS DURATION - MUSE: 156 MS
QT - MUSE: 458 MS
QTC - MUSE: 464 MS
R AXIS - MUSE: -50 DEGREES
RBC # BLD AUTO: 4.17 10E6/UL (ref 4.4–5.9)
SODIUM SERPL-SCNC: 139 MMOL/L (ref 135–145)
SYSTOLIC BLOOD PRESSURE - MUSE: NORMAL MMHG
T AXIS - MUSE: -11 DEGREES
VENTRICULAR RATE- MUSE: 62 BPM
WBC # BLD AUTO: 7.6 10E3/UL (ref 4–11)

## 2025-04-20 PROCEDURE — 85018 HEMOGLOBIN: CPT

## 2025-04-20 PROCEDURE — 36415 COLL VENOUS BLD VENIPUNCTURE: CPT

## 2025-04-20 PROCEDURE — 99232 SBSQ HOSP IP/OBS MODERATE 35: CPT | Mod: GC

## 2025-04-20 PROCEDURE — 250N000013 HC RX MED GY IP 250 OP 250 PS 637

## 2025-04-20 PROCEDURE — 93010 ELECTROCARDIOGRAM REPORT: CPT | Mod: RTG | Performed by: INTERNAL MEDICINE

## 2025-04-20 PROCEDURE — 97129 THER IVNTJ 1ST 15 MIN: CPT | Mod: GO

## 2025-04-20 PROCEDURE — 120N000001 HC R&B MED SURG/OB

## 2025-04-20 PROCEDURE — 97535 SELF CARE MNGMENT TRAINING: CPT | Mod: GO

## 2025-04-20 PROCEDURE — 258N000003 HC RX IP 258 OP 636

## 2025-04-20 PROCEDURE — 80048 BASIC METABOLIC PNL TOTAL CA: CPT

## 2025-04-20 RX ADMIN — OXYCODONE HYDROCHLORIDE 5 MG: 5 TABLET ORAL at 04:00

## 2025-04-20 RX ADMIN — DOXAZOSIN 8 MG: 4 TABLET ORAL at 21:11

## 2025-04-20 RX ADMIN — ASPIRIN 81 MG: 81 TABLET, COATED ORAL at 21:12

## 2025-04-20 RX ADMIN — APIXABAN 10 MG: 5 TABLET, FILM COATED ORAL at 08:29

## 2025-04-20 RX ADMIN — Medication 25 MCG: at 21:12

## 2025-04-20 RX ADMIN — ACETAMINOPHEN 975 MG: 325 TABLET, FILM COATED ORAL at 21:12

## 2025-04-20 RX ADMIN — SODIUM CHLORIDE: 0.9 INJECTION, SOLUTION INTRAVENOUS at 03:46

## 2025-04-20 RX ADMIN — CYANOCOBALAMIN TAB 250 MCG 250 MCG: 250 TAB at 21:12

## 2025-04-20 RX ADMIN — ACETAMINOPHEN 975 MG: 325 TABLET, FILM COATED ORAL at 08:30

## 2025-04-20 RX ADMIN — APIXABAN 10 MG: 5 TABLET, FILM COATED ORAL at 21:12

## 2025-04-20 RX ADMIN — OXYCODONE HYDROCHLORIDE 5 MG: 5 TABLET ORAL at 15:37

## 2025-04-20 RX ADMIN — ACETAMINOPHEN 975 MG: 325 TABLET, FILM COATED ORAL at 14:11

## 2025-04-20 RX ADMIN — SENNOSIDES AND DOCUSATE SODIUM 1 TABLET: 50; 8.6 TABLET ORAL at 08:30

## 2025-04-20 RX ADMIN — ATORVASTATIN CALCIUM 40 MG: 40 TABLET, FILM COATED ORAL at 21:12

## 2025-04-20 RX ADMIN — METOPROLOL TARTRATE 25 MG: 25 TABLET, FILM COATED ORAL at 21:12

## 2025-04-20 RX ADMIN — METOPROLOL TARTRATE 25 MG: 25 TABLET, FILM COATED ORAL at 08:29

## 2025-04-20 RX ADMIN — Medication 25 MCG: at 08:29

## 2025-04-20 RX ADMIN — HYDROCHLOROTHIAZIDE 12.5 MG: 12.5 TABLET ORAL at 21:11

## 2025-04-20 ASSESSMENT — ACTIVITIES OF DAILY LIVING (ADL)
ADLS_ACUITY_SCORE: 54
ADLS_ACUITY_SCORE: 53
ADLS_ACUITY_SCORE: 54
ADLS_ACUITY_SCORE: 53
ADLS_ACUITY_SCORE: 54
ADLS_ACUITY_SCORE: 47
ADLS_ACUITY_SCORE: 47
ADLS_ACUITY_SCORE: 53
ADLS_ACUITY_SCORE: 54
ADLS_ACUITY_SCORE: 54
ADLS_ACUITY_SCORE: 47
ADLS_ACUITY_SCORE: 47
ADLS_ACUITY_SCORE: 54
ADLS_ACUITY_SCORE: 47
ADLS_ACUITY_SCORE: 53
ADLS_ACUITY_SCORE: 53
ADLS_ACUITY_SCORE: 47
ADLS_ACUITY_SCORE: 54
ADLS_ACUITY_SCORE: 47

## 2025-04-20 NOTE — PROGRESS NOTES
[facility]    Progress Note - Hospitalist Service       Date of Admission:  4/18/2025    Assessment & Plan   Angelica Maharaj is a 89 year old male admitted on 4/18/2025. He has a history of AAA s/p stent graft repair (2011), CAD s/p CABG (~2009), hypertension, HLD, BPH, osteopenia, PVD who presented with recurrent falls at home, subsequently found to have elevated CK and left lower extremity DVT.     LLE DVT  PAD  History of PVD with possible bypass in 2023, now with LLE DVT. MARISA's of bilateral lower extremities showing moderate arterial disease on the right but severe disease on the left.   - Continue eliquis 10 mg x 7 days, followed by 5 mg thereafter   - consider vascular consult     Recurrent Falls  Concern for cognitive dysfunction  Discharge planning  Unclear etiology of his falls, though from the history he cannot remember appears most likely mechanical versus orthostatic hypotension. Especially worry it could be related to his chronic back pain/opiate use as below. No evidence of arrhythmia so far.  Unclear how long he was down, though was able to call his niece and an ambulance.  Workup reassuring with no ICH or vertebral fractures, labs largely normal. Vitals also reassuring, intermittently hypertensive though asymptomatic. Does report using walker and wheelchair at home which suggests history of gait difficulties, though history is unclear.  With his independent living status and current situation, he is very high risk for falls and recurrent falls, particularly with what appears to be some level of cognitive dysfunction in last 6 months, noted here as well.  Needs a safe disposition plan, suspect that he may need a higher level of care.  - Orthostatic vitals ordered  - Delirium precautions  - PT/OT consulted, please complete SLUMS eval  - Care management for dispo planning      Elevated CK  CK elevated to 1484, reassured that there is no evidence of kidney dysfunction.  "Electrolytes otherwise normal, does not appear to be florid rhabdo at this time, though does suggest prolonged time down at home.   - discontinued IV fluids 4/20  - encourage fluids      Chronic back pain  Osteopenia  Managed long-term with chronic opiates, not reporting any recent changes or changes after the fall today though is having 8-9/10 pain.  Tender in the paraspinal muscles bilateral thoracic area.  No tenderness along spinous processes reassuring, along with no radiographic evidence of no fracture.   - Held PTA norco  - Scheduled tylenol 975 mg TID  - PRN oxycodone  - PT/OT     Normocytic Anemia  Mild, hgb 11.2 on admission. Will continue to monitor.   - Daily CBC      Abdominal Tenderness  Constipation  Has been a couple days since last BM, normally daily, with some mild tenderness and distention though not an acute abdomen.   - Scheduled senna-docusate        Chronic conditions:  BPH - PTA doxazosin      HTN - PTA hydrochlorothiazide and metoprolol      AAA s/p stent graft repair (2011)  CAD s/p CABG  - PTA ASA 81     HLD  - PTA atorvastatin 40 mg             Diet: Regular Diet Adult    DVT Prophylaxis: DOAC  Boyd Catheter: Not present  Fluids: NS @ 125mL/hr  Lines: None     Cardiac Monitoring: None  Code Status: Full Code      Clinically Significant Risk Factors Present on Admission                 # Drug Induced Platelet Defect: home medication list includes an antiplatelet medication   # Hypertension: Home medication list includes antihypertensive(s)           # Overweight: Estimated body mass index is 27.8 kg/m  as calculated from the following:    Height as of this encounter: 1.753 m (5' 9\").    Weight as of this encounter: 85.4 kg (188 lb 4.4 oz).       # Financial/Environmental Concerns: none         Social Drivers of Health   Tobacco Use: Medium Risk (4/11/2024)    Received from [facility]     Patient History     Smoking Tobacco Use: Former     Smokeless Tobacco Use: Former       "          The patient's care was discussed with the Attending Physician, Dr. Alex MD .    Leigh Hoyos, DO  [facility]    ______________________________________________________________________    Interval History   Chronic low back pain with left sided radicular symptoms this morning. Not feeling super strong this morning. Defers a lot of questions.     Currently, lives at home independently.     Physical Exam   Vital Signs: Temp: 98.3  F (36.8  C) Temp src: Oral BP: (!) 183/77 (RN notified) Pulse: 82   Resp: 18 SpO2: 97 % O2 Device: None (Room air)    Weight: 188 lbs 4.37 oz    GEN: Pleasant male, in no acute distress.   HEENT: Normocephalic, atraumatic.   NECK: Supple. No cervical or supraclavicular adenopathy.   PULM: Non-labored breathing. No use of accessory muscles. Clear to ausculation bilaterally.  CV: Regular rate and rhythm. Normal S1, S2. No rubs, murmurs, or gallops.    ABDOMEN: Soft, non-tender  EXTREMITES:  LLE with 2+ pitting edema. Non-tender to palpation with symmetric sensation. No overlying erythema.   NEURO:  Awake. Moving all extremities.    PSYCH: Calm. Appropriate affect, insight, judgment.       Medical Decision Making             Data     I have personally reviewed the following data over the past 24 hrs:    7.6  \   11.2 (L)   / 164     139 105 19.3 /  105 (H)   4.0 25 0.90 \       Imaging results reviewed over the past 24 hrs:   No results found for this or any previous visit (from the past 24 hours).

## 2025-04-20 NOTE — PROGRESS NOTES
Care Management Follow Up    Length of Stay (days): 1    Expected Discharge Date: 04/21/2025    Anticipated Discharge Plan:   [facility] [phone]    Transportation: Anticipate Wheelchair. Transportation costs discussed? Yes. Discussed with pt and sammy Duckworth    PT Recommendations: home with home care physical therapy  OT Recommendations:  Transitional Care Facility     Barriers to Discharge: medical stability    Prior Living Situation: apartment with alone    Discussed  Partnership in Safe Discharge Planning  document with patient/family: No     Handoff Completed: No, handoff not indicated or clinically appropriate    Patient/Spokesperson Updated: Yes. Who? Pt and sammy    Additional Information:  Pt likely will be ready Monday, needs transport set up. Sammy Duckworth currently lives in [place] and per Donita and pt, she is planning to move down in a few months and they will likely get a place together. Pt is clear about that and Donita confirms.     Next Steps:   Medical stability Low Slums score but lives alone independently  Set up WC transport  Update sammy Duckworth of discharge  Accepted by [facility] for homecare [facility], needs RN/PT/OT/BRCUE Farah RN

## 2025-04-20 NOTE — PLAN OF CARE
Problem: Adult Inpatient Plan of Care  Goal: Optimal Comfort and Wellbeing  Outcome: Progressing  Intervention: Monitor Pain and Promote Comfort  Recent Flowsheet Documentation  Taken 4/19/2025 2101 by Margot Martinez RN  Pain Management Interventions: medication (see MAR)  Intervention: Provide Person-Centered Care  Recent Flowsheet Documentation  Taken 4/20/2025 0016 by Margot Martinez RN  Trust Relationship/Rapport:   care explained   questions answered   reassurance provided   thoughts/feelings acknowledged  Taken 4/19/2025 2100 by Margot Martinez RN  Trust Relationship/Rapport:   care explained   questions answered   reassurance provided   thoughts/feelings acknowledged     Problem: Pain Acute  Goal: Optimal Pain Control and Function  Outcome: Progressing  Intervention: Develop Pain Management Plan  Recent Flowsheet Documentation  Taken 4/19/2025 2101 by Margot Martinez RN  Pain Management Interventions: medication (see MAR)  Intervention: Prevent or Manage Pain  Recent Flowsheet Documentation  Taken 4/20/2025 0016 by Margot Martinez RN  Sensory Stimulation Regulation:   care clustered   auditory stimulation minimized   quiet environment promoted  Medication Review/Management: medications reviewed  Taken 4/19/2025 2100 by Margot Martinez RN  Sensory Stimulation Regulation:   care clustered   auditory stimulation minimized   quiet environment promoted  Medication Review/Management: medications reviewed     Problem: Comorbidity Management  Goal: Blood Pressure in Desired Range  Outcome: Progressing  Intervention: Maintain Blood Pressure Management  Recent Flowsheet Documentation  Taken 4/20/2025 0016 by Margot Martinez RN  Medication Review/Management: medications reviewed  Taken 4/19/2025 2100 by Margot Martinez RN  Medication Review/Management: medications reviewed   Goal Outcome Evaluation:      Plan of Care Reviewed With: patient    Overall Patient Progress: no changeOverall Patient  Progress: no change     NURSING PROGRESS NOTE  Shift Summary      Date: April 20, 2025     Neuro/Musculoskeletal:  O x self  Cardiac:  tele Afib, HR 55.  VSS.     Respiratory:  Sating in the 90s on RA.  GI/:  Adequate urine output.    Diet/Appetite:  Tolerating reg diet.  Activity:  Assist of 1 with walker   Pain:  managed with Prn oxycodone  Skin:  No new deficits noted.   LDAs + Drips/IVF:  R piv running nacl at           Margot Martinez RN  ....................................................

## 2025-04-20 NOTE — PLAN OF CARE
Patient alert to self and place. Confused to time and situation. Forgetful and set off bed alarm or called frequently to get up. Taking Tylenol for chronic back pain with good relief.   Problem: Adult Inpatient Plan of Care  Goal: Absence of Hospital-Acquired Illness or Injury  Intervention: Identify and Manage Fall Risk  Recent Flowsheet Documentation  Taken 4/20/2025 0810 by Lay Rice, RN  Safety Promotion/Fall Prevention:   activity supervised   clutter free environment maintained   increased rounding and observation   increase visualization of patient   mobility aid in reach   nonskid shoes/slippers when out of bed   room near nurse's station   safety round/check completed   Goal Outcome Evaluation:

## 2025-04-21 ENCOUNTER — APPOINTMENT (OUTPATIENT)
Dept: OCCUPATIONAL THERAPY | Facility: HOSPITAL | Age: OVER 89
DRG: 301 | End: 2025-04-21
Payer: COMMERCIAL

## 2025-04-21 ENCOUNTER — APPOINTMENT (OUTPATIENT)
Dept: PHYSICAL THERAPY | Facility: HOSPITAL | Age: OVER 89
DRG: 301 | End: 2025-04-21
Payer: COMMERCIAL

## 2025-04-21 VITALS
WEIGHT: 188.27 LBS | HEART RATE: 68 BPM | OXYGEN SATURATION: 96 % | TEMPERATURE: 97.8 F | BODY MASS INDEX: 27.89 KG/M2 | HEIGHT: 69 IN | DIASTOLIC BLOOD PRESSURE: 93 MMHG | SYSTOLIC BLOOD PRESSURE: 164 MMHG | RESPIRATION RATE: 20 BRPM

## 2025-04-21 LAB
ANION GAP SERPL CALCULATED.3IONS-SCNC: 8 MMOL/L (ref 7–15)
BUN SERPL-MCNC: 16.5 MG/DL (ref 8–23)
CALCIUM SERPL-MCNC: 8.9 MG/DL (ref 8.8–10.4)
CHLORIDE SERPL-SCNC: 104 MMOL/L (ref 98–107)
CREAT SERPL-MCNC: 0.9 MG/DL (ref 0.67–1.17)
EGFRCR SERPLBLD CKD-EPI 2021: 80 ML/MIN/1.73M2
ERYTHROCYTE [DISTWIDTH] IN BLOOD BY AUTOMATED COUNT: 14.6 % (ref 10–15)
GLUCOSE SERPL-MCNC: 100 MG/DL (ref 70–99)
HCO3 SERPL-SCNC: 27 MMOL/L (ref 22–29)
HCT VFR BLD AUTO: 36.2 % (ref 40–53)
HGB BLD-MCNC: 11.1 G/DL (ref 13.3–17.7)
MCH RBC QN AUTO: 26 PG (ref 26.5–33)
MCHC RBC AUTO-ENTMCNC: 30.7 G/DL (ref 31.5–36.5)
MCV RBC AUTO: 85 FL (ref 78–100)
PLATELET # BLD AUTO: 179 10E3/UL (ref 150–450)
POTASSIUM SERPL-SCNC: 3.7 MMOL/L (ref 3.4–5.3)
RBC # BLD AUTO: 4.27 10E6/UL (ref 4.4–5.9)
SODIUM SERPL-SCNC: 139 MMOL/L (ref 135–145)
WBC # BLD AUTO: 6.7 10E3/UL (ref 4–11)

## 2025-04-21 PROCEDURE — 97116 GAIT TRAINING THERAPY: CPT | Mod: GP

## 2025-04-21 PROCEDURE — 99238 HOSP IP/OBS DSCHRG MGMT 30/<: CPT | Mod: GC

## 2025-04-21 PROCEDURE — 36415 COLL VENOUS BLD VENIPUNCTURE: CPT

## 2025-04-21 PROCEDURE — 97535 SELF CARE MNGMENT TRAINING: CPT | Mod: GO

## 2025-04-21 PROCEDURE — 250N000013 HC RX MED GY IP 250 OP 250 PS 637

## 2025-04-21 PROCEDURE — 97110 THERAPEUTIC EXERCISES: CPT | Mod: GP

## 2025-04-21 PROCEDURE — 97530 THERAPEUTIC ACTIVITIES: CPT | Mod: GO

## 2025-04-21 PROCEDURE — 85014 HEMATOCRIT: CPT

## 2025-04-21 PROCEDURE — 80048 BASIC METABOLIC PNL TOTAL CA: CPT

## 2025-04-21 PROCEDURE — 97530 THERAPEUTIC ACTIVITIES: CPT | Mod: GP

## 2025-04-21 RX ADMIN — METOPROLOL TARTRATE 25 MG: 25 TABLET, FILM COATED ORAL at 08:09

## 2025-04-21 RX ADMIN — Medication 25 MCG: at 08:09

## 2025-04-21 RX ADMIN — ACETAMINOPHEN 975 MG: 325 TABLET, FILM COATED ORAL at 08:08

## 2025-04-21 RX ADMIN — SENNOSIDES AND DOCUSATE SODIUM 1 TABLET: 50; 8.6 TABLET ORAL at 08:09

## 2025-04-21 RX ADMIN — APIXABAN 10 MG: 5 TABLET, FILM COATED ORAL at 08:09

## 2025-04-21 ASSESSMENT — ACTIVITIES OF DAILY LIVING (ADL)
ADLS_ACUITY_SCORE: 54

## 2025-04-21 NOTE — PLAN OF CARE
Problem: Adult Inpatient Plan of Care  Goal: Absence of Hospital-Acquired Illness or Injury  Intervention: Prevent Infection  Recent Flowsheet Documentation  Taken 4/20/2025 1545 by All Thomas, RN  Infection Prevention:   hand hygiene promoted   personal protective equipment utilized   single patient room provided     Problem: Adult Inpatient Plan of Care  Goal: Absence of Hospital-Acquired Illness or Injury  Intervention: Identify and Manage Fall Risk  Recent Flowsheet Documentation  Taken 4/20/2025 1545 by All Thomas, RN  Safety Promotion/Fall Prevention:   activity supervised   clutter free environment maintained   room organization consistent   safety round/check completed     Problem: Adult Inpatient Plan of Care  Goal: Optimal Comfort and Wellbeing  Intervention: Monitor Pain and Promote Comfort  Recent Flowsheet Documentation  Taken 4/20/2025 1537 by All Thomas, RN  Pain Management Interventions:   medication (see MAR)   emotional support   rest       Goal Outcome Evaluation:      Plan of Care Reviewed With: patient    Overall Patient Progress: improvingOverall Patient Progress: improving       Alert, oriented to self, place only. VSS except hypertensive, bradycardic on RA. Pain addressed with PRN medication. Denies nausea/SOB. No IV access, OK per provider. Regular diet, tolerating well. Assist x 1 w/ walker & gait belt. [place] following. Nursing continue to monitor.

## 2025-04-21 NOTE — DISCHARGE SUMMARY
"[facility]  Discharge Summary - Medicine & Pediatrics       Date of Admission:  4/18/2025  Date of Discharge:  4/21/2025  Discharging Provider: Leigh Hoyos DO; Dar Jewell MD  Discharge Service: Hospitalist Service    Discharge Diagnoses   The primary encounter diagnosis was Acute deep vein thrombosis (DVT) of distal end of left lower extremity (H). Diagnoses of Weakness and Fall at home, initial encounter were also pertinent to this visit.      Clinically Significant Risk Factors     # Overweight: Estimated body mass index is 27.8 kg/m  as calculated from the following:    Height as of this encounter: 1.753 m (5' 9\").    Weight as of this encounter: 85.4 kg (188 lb 4.4 oz).       Follow-ups Needed After Discharge   Follow-up Appointments       Follow Up and recommended labs and tests      Follow up with Nursing home physician.  Repeat ultrasound of left lower extremity to evaluate for progression of distal DVT every week for 2 weeks                Unresulted Labs Ordered in the Past 30 Days of this Admission       No orders found from 3/19/2025 to 4/19/2025.            Discharge Disposition   Discharged to TCU  Condition at discharge: Stable    Hospital Course   Angelica Maharaj was admitted on 4/18/2025 for recurrent falls at home, subsequently found to have elevated CK and left lower extremity DVT.  The following problems were addressed during his hospitalization:    LLE DVT  PAD  History of PVD with possible bypass in 2023, now with LLE DVT. MARISA's of bilateral lower extremities showing moderate arterial disease on the right but severe disease on the left.  Patient was initially initiated on anticoagulation with Eliquis 10 mg twice daily.  However, after further PT/OT evaluation and discussion with patient's age and risk for falls, decision was made to forego anticoagulation at this time.  Plan for serial ultrasounds for surveillance of DVT every week for 2 weeks.  Orders placed.   "   Recurrent Falls  Concern for cognitive dysfunction  Discharge planning  Unclear etiology of his falls, though from the history he cannot remember appears most likely mechanical versus orthostatic hypotension. Especially worry it could be related to his chronic back pain/opiate use as below. No evidence of arrhythmia so far.  Unclear how long he was down, though was able to call his niece and an ambulance.  Workup reassuring with no ICH or vertebral fractures, labs largely normal. Vitals also reassuring, intermittently hypertensive though asymptomatic. Does report using walker and wheelchair at home which suggests history of gait difficulties, though history is unclear.  With his independent living status and current situation, he is very high risk for falls and recurrent falls, particularly with what appears to be some level of cognitive dysfunction in last 6 months, noted here as well.  After further evaluation, patient noted to have slums of 8.  Patient discharged to TCU for higher level of care at this time.       Elevated CK  CK elevated to 1484, reassured that there is no evidence of kidney dysfunction. Electrolytes otherwise normal. Patient received IV fluids, which has since been discontinued with encouragement of PO fluids.       Chronic back pain  Osteopenia  Managed long-term with chronic opiates, not reporting any recent changes or changes after the fall today though is having 8-9/10 pain.  Tender in the paraspinal muscles bilateral thoracic area.  No tenderness along spinous processes reassuring, along with no radiographic evidence of no fracture.      Normocytic Anemia  Mild, hgb 11.2 on admission. Will continue to monitor.  Throughout hospitalization, hemoglobin remained largely stable.  Hemodynamically patient stable throughout hospital stay.        Chronic conditions:  BPH - PTA doxazosin   HTN - PTA hydrochlorothiazide and metoprolol   AAA s/p stent graft repair (2011)  CAD s/p CABG- PTA ASA  81  HLD- PTA atorvastatin 40 mg    Consultations This Hospital Stay   CARE MANAGEMENT / SOCIAL WORK IP CONSULT  PHYSICAL THERAPY ADULT IP CONSULT  OCCUPATIONAL THERAPY ADULT IP CONSULT  PHARMACY IP CONSULT  OCCUPATIONAL THERAPY ADULT IP CONSULT  PHARMACY LIAISON FOR MEDICATION COVERAGE CONSULT  OCCUPATIONAL THERAPY ADULT IP CONSULT  CARE MANAGEMENT / SOCIAL WORK IP CONSULT  PHYSICAL THERAPY ADULT IP CONSULT  OCCUPATIONAL THERAPY ADULT IP CONSULT    Code Status   Full Code       The patient was discussed with MD Leigh Tobias [facility]  [address] [place] [id number]  Phone: [phone]  Fax: [phone]  ______________________________________________________________________    Physical Exam   Vital Signs: Temp: 97.8  F (36.6  C) Temp src: Oral BP: (!) 164/93 Pulse: 68   Resp: 20 SpO2: 96 % O2 Device: None (Room air)    Weight: 188 lbs 4.37 oz  GEN: Pleasant male, in no acute distress.   HEENT: Normocephalic, atraumatic.  NECK: Supple.   PULM: Non-labored breathing. No use of accessory muscles. Clear to ausculation bilaterally.  CV: Regular rate and rhythm.   ABDOMEN: Soft, non-tender  EXTREMITES:  LLE with 1-2+ edema; non tender, no overlying erythema  NEURO:  Awake. Moving all extremities.    PSYCH: Calm. Appropriate affect, insight, judgment.        Primary Care Physician   [facility]    Discharge Orders      US Lower Extremity Venous Duplex Left     US Lower Extremity Venous Duplex Left     General info for SNF    Length of Stay Estimate: Short Term Care: Estimated # of Days <30  Condition at Discharge: Stable  Level of care:skilled   Rehabilitation Potential: Good  Admission H&P remains valid and up-to-date: Yes  Recent Chemotherapy: N/A  Use Nursing Home Standing Orders: Yes     Follow Up and recommended labs and tests    Follow up with Nursing home physician.  Repeat ultrasound of left lower  extremity to evaluate for progression of distal DVT every week for 2 weeks     Reason for your hospital stay    You are hospitalized after a fall.     Activity - Up with nursing assistance     Physical Therapy Adult Consult    Evaluate and treat as clinically indicated.    Reason:  weakness, history of falls     Occupational Therapy Adult Consult    Evaluate and treat as clinically indicated.    Reason:   weakness, history of falls     Diet    Follow this diet upon discharge: Current Diet:Orders Placed This Encounter      Regular Diet Adult       Significant Results and Procedures   Most Recent 3 CBC's:  Recent Labs   Lab Test 04/21/25  0643 04/20/25  0635 04/19/25  0609   WBC 6.7 7.6 7.7   HGB 11.1* 11.2* 10.5*   MCV 85 85 84    164 152     Most Recent 3 BMP's:  Recent Labs   Lab Test 04/21/25  0643 04/20/25  0635 04/19/25  0609    139 140   POTASSIUM 3.7 4.0 4.0   CHLORIDE 104 105 104   CO2 27 25 31*   BUN 16.5 19.3 23.9*   CR 0.90 0.90  0.90 1.02   ANIONGAP 8 9 5*   ALCIDES 8.9 8.3* 8.4*   * 105* 95       Discharge Medications   Current Discharge Medication List        CONTINUE these medications which have NOT CHANGED    Details   aspirin 81 MG EC tablet Take 81 mg by mouth daily.      atorvastatin (LIPITOR) 40 MG tablet Take 40 mg by mouth at bedtime.      doxazosin (CARDURA) 8 MG tablet Take 8 mg by mouth at bedtime.      hydroCHLOROthiazide 12.5 MG tablet Take 12.5 mg by mouth daily.      HYDROcodone-acetaminophen (NORCO) 7.5-325 MG per tablet Take 1 tablet by mouth every 6 hours as needed for severe pain, pain or moderate to severe pain.      metoprolol tartrate (LOPRESSOR) 25 MG tablet Take 25 mg by mouth 2 times daily.      sildenafil (VIAGRA) 100 MG tablet Take 100 mg by mouth as needed.      vitamin B-12 (CYANOCOBALAMIN) 250 MCG tablet Take 1 tablet by mouth daily.      Vitamin D3 (VITAMIN D, CHOLECALCIFEROL,) 25 mcg (1000 units) tablet Take 1 tablet by mouth 2 times daily.            Allergies   No Known Allergies

## 2025-04-21 NOTE — PLAN OF CARE
Goal Outcome Evaluation:                 Problem: Adult Inpatient Plan of Care  Goal: Optimal Comfort and Wellbeing  Outcome: Progressing  Intervention: Monitor Pain and Promote Comfort  Recent Flowsheet Documentation  Taken 4/21/2025 0420 by Sylvie Dong RN  Pain Management Interventions:   rest   quiet environment facilitated  Taken 4/21/2025 0123 by Sylvie Dong RN  Pain Management Interventions:   rest   quiet environment facilitated     Problem: Delirium  Goal: Improved Sleep  Outcome: Progressing     Problem: Pain Acute  Goal: Optimal Pain Control and Function  Outcome: Progressing  Intervention: Develop Pain Management Plan  Recent Flowsheet Documentation  Taken 4/21/2025 0420 by Sylvie Dong RN  Pain Management Interventions:   rest   quiet environment facilitated  Taken 4/21/2025 0123 by Sylvie Dong RN  Pain Management Interventions:   rest   quiet environment facilitated  Intervention: Prevent or Manage Pain  Recent Flowsheet Documentation  Taken 4/21/2025 0422 by Sylvie Dong RN  Medication Review/Management: medications reviewed  Taken 4/21/2025 0125 by Sylvie Dong RN  Medication Review/Management: medications reviewed     Problem: Comorbidity Management  Goal: Blood Pressure in Desired Range  Intervention: Maintain Blood Pressure Management  Recent Flowsheet Documentation  Taken 4/21/2025 0422 by Sylvie Dong RN  Medication Review/Management: medications reviewed  Taken 4/21/2025 0125 by Sylvie Dong RN  Medication Review/Management: medications reviewed     Problem: Adult Inpatient Plan of Care  Goal: Absence of Hospital-Acquired Illness or Injury  Intervention: Prevent Skin Injury  Recent Flowsheet Documentation  Taken 4/21/2025 0422 by Sylvie Dong RN  Body Position: position changed independently  Taken 4/21/2025 0125 by Sylvie Dong RN  Body Position: position changed independently     Patient admitted for weakness. Denies pain throughout the  night. Intermittent confusion. VSS except BP in the 170s. Alert and oriented to self. RA. No IV access; Provider aware. A1 with mobility. Ambulated to the bathroom 3 times during the night. Regular diet. PT/OT consulted. Slept well.

## 2025-04-21 NOTE — PROGRESS NOTES
Physical Therapy Discharge Summary    Reason for therapy discharge:    Discharged to TCU.    Progress towards therapy goal(s). See goals on Care Plan in [facility] electronic health record for goal details.  Goals partially met.  Barriers to achieving goals:   discharge from facility.    Therapy recommendation(s):    Further recommended therapy is related to documented deficits, and is necessary to maximize functional independence in order for patient to return to prior level of function.      Clair Ambrosio, LUNA 4/21/2025

## 2025-04-21 NOTE — PLAN OF CARE
Occupational Therapy Discharge Summary    Reason for therapy discharge:    Discharged to transitional care facility.    Progress towards therapy goal(s). See goals on Care Plan in [facility] electronic health record for goal details.  Goals partially met.  Barriers to achieving goals:   discharge from facility.    Therapy recommendation(s):    Continued therapy is recommended.  Rationale/Recommendations:  to improve safety and IND with ADLs .

## 2025-04-21 NOTE — PLAN OF CARE
Transportation ride to TCU @1451.     Patient discharged to transitional care unit at 1451 via Health plan transportation.  Accompanied by self and staff.  Discharge instructions were reviewed with patient, opportunity offered to ask questions.    Prescriptions N/A.  Access discontinued: Yes  Care plan and education discontinued: Yes  Home meds retrieved from pharmacy: No  Belongings were sent home with patient/family:  Cell phone/electronics: 1, Clothing: Shirt(s): 1, Pants: 1, and Outerwear: 1, and Shoes: 2 .

## 2025-04-21 NOTE — PROGRESS NOTES
Care Management Discharge Note    Discharge Date: 04/21/2025       Discharge Disposition:  [facility]    Discharge Services:  TCU     Discharge DME:  n/a    Discharge Transportation: car, drives self    Private pay costs discussed: transportation costs    Does the patient's insurance plan have a 3 day qualifying hospital stay waiver?  Yes     Which insurance plan 3 day waiver is available? Alternative insurance waiver    Will the waiver be used for post-acute placement? Yes    PAS Confirmation Code:  [id number]  Patient/family educated on Medicare website which has current facility and service quality ratings:  yes    Education Provided on the Discharge Plan:  yes  Persons Notified of Discharge Plans: yes  Patient/Family in Agreement with the Plan: yes      Handoff Referral Completed: No, handoff not indicated or clinically appropriate    Additional Information:  SW spoke with niece due to [facility] (8/30) in regards to TCU needs; agreeable and stated that there was no preference on facility. Due to [employer] insurance, referrals sent to contracted facilities. Pt accepted to [facility] near home; bed secured per nizarina. Insurance auth needed; sent and pending. [facility] ride set for 1500 with a  window between [phone] (agreeable to potential for private pay). All parties aware and agreeable to discharge plan.  10:42 AM    Insurance auth received.  11:33 AM    Brenna Kjellberg, BSW LSW  4/21/2025

## 2025-04-22 ENCOUNTER — LAB REQUISITION (OUTPATIENT)
Dept: LAB | Facility: CLINIC | Age: OVER 89
End: 2025-04-22

## 2025-04-22 DIAGNOSIS — D64.9 ANEMIA, UNSPECIFIED: ICD-10-CM

## 2025-04-22 DIAGNOSIS — E87.8 OTHER DISORDERS OF ELECTROLYTE AND FLUID BALANCE, NOT ELSEWHERE CLASSIFIED: ICD-10-CM

## 2025-04-22 LAB
ATRIAL RATE - MUSE: 86 BPM
DIASTOLIC BLOOD PRESSURE - MUSE: 108 MMHG
INTERPRETATION ECG - MUSE: NORMAL
P AXIS - MUSE: 108 DEGREES
PR INTERVAL - MUSE: 250 MS
QRS DURATION - MUSE: 152 MS
QT - MUSE: 428 MS
QTC - MUSE: 512 MS
R AXIS - MUSE: -47 DEGREES
SYSTOLIC BLOOD PRESSURE - MUSE: 190 MMHG
T AXIS - MUSE: 37 DEGREES
VENTRICULAR RATE- MUSE: 86 BPM

## 2025-04-23 LAB
ANION GAP SERPL CALCULATED.3IONS-SCNC: 11 MMOL/L (ref 7–15)
BUN SERPL-MCNC: 18.5 MG/DL (ref 8–23)
CALCIUM SERPL-MCNC: 9 MG/DL (ref 8.8–10.4)
CHLORIDE SERPL-SCNC: 101 MMOL/L (ref 98–107)
CREAT SERPL-MCNC: 0.92 MG/DL (ref 0.67–1.17)
EGFRCR SERPLBLD CKD-EPI 2021: 78 ML/MIN/1.73M2
ERYTHROCYTE [DISTWIDTH] IN BLOOD BY AUTOMATED COUNT: 14.6 % (ref 10–15)
GLUCOSE SERPL-MCNC: 127 MG/DL (ref 70–99)
HCO3 SERPL-SCNC: 28 MMOL/L (ref 22–29)
HCT VFR BLD AUTO: 35.8 % (ref 40–53)
HGB BLD-MCNC: 10.9 G/DL (ref 13.3–17.7)
MCH RBC QN AUTO: 26.7 PG (ref 26.5–33)
MCHC RBC AUTO-ENTMCNC: 30.4 G/DL (ref 31.5–36.5)
MCV RBC AUTO: 88 FL (ref 78–100)
PLATELET # BLD AUTO: 188 10E3/UL (ref 150–450)
POTASSIUM SERPL-SCNC: 3.4 MMOL/L (ref 3.4–5.3)
RBC # BLD AUTO: 4.09 10E6/UL (ref 4.4–5.9)
SODIUM SERPL-SCNC: 140 MMOL/L (ref 135–145)
WBC # BLD AUTO: 5.5 10E3/UL (ref 4–11)

## 2025-04-23 PROCEDURE — 36415 COLL VENOUS BLD VENIPUNCTURE: CPT

## 2025-04-23 PROCEDURE — 80048 BASIC METABOLIC PNL TOTAL CA: CPT

## 2025-04-23 PROCEDURE — P9604 ONE-WAY ALLOW PRORATED TRIP: HCPCS

## 2025-04-23 PROCEDURE — 85027 COMPLETE CBC AUTOMATED: CPT

## 2025-06-02 ENCOUNTER — HOSPITAL ENCOUNTER (EMERGENCY)
Facility: CLINIC | Age: OVER 89
Discharge: HOME | End: 2025-06-02
Payer: COMMERCIAL

## 2025-06-02 VITALS
RESPIRATION RATE: 14 BRPM | DIASTOLIC BLOOD PRESSURE: 98 MMHG | TEMPERATURE: 98.3 F | SYSTOLIC BLOOD PRESSURE: 125 MMHG | OXYGEN SATURATION: 95 % | HEART RATE: 63 BPM

## 2025-06-02 DIAGNOSIS — L97.529 ULCER OF TOE OF LEFT FOOT, UNSPECIFIED ULCER STAGE (H): ICD-10-CM

## 2025-06-02 DIAGNOSIS — R25.1 EPISODE OF SHAKING: Primary | ICD-10-CM

## 2025-06-02 PROBLEM — L40.8 OTHER PSORIASIS: Status: ACTIVE | Noted: 2025-06-02

## 2025-06-02 PROBLEM — H26.9 CATARACT OF BOTH EYES: Status: ACTIVE | Noted: 2025-06-02

## 2025-06-02 PROBLEM — N40.0 BPH (BENIGN PROSTATIC HYPERPLASIA): Status: ACTIVE | Noted: 2024-04-11

## 2025-06-02 PROBLEM — Z95.828 HISTORY OF ENDOVASCULAR STENT GRAFT FOR ABDOMINAL AORTIC ANEURYSM (AAA): Status: ACTIVE | Noted: 2025-06-02

## 2025-06-02 PROBLEM — M51.369 DEGENERATION OF LUMBAR INTERVERTEBRAL DISC: Status: ACTIVE | Noted: 2025-06-02

## 2025-06-02 PROBLEM — D64.9 ANEMIA: Status: ACTIVE | Noted: 2025-06-02

## 2025-06-02 PROBLEM — R26.2 DIFFICULTY IN WALKING, NOT ELSEWHERE CLASSIFIED: Status: ACTIVE | Noted: 2025-06-02

## 2025-06-02 PROBLEM — E78.5 HYPERLIPIDEMIA: Status: ACTIVE | Noted: 2025-06-02

## 2025-06-02 PROBLEM — R05.9 COUGH: Status: ACTIVE | Noted: 2025-06-02

## 2025-06-02 PROBLEM — I10 BENIGN ESSENTIAL HYPERTENSION: Status: ACTIVE | Noted: 2025-06-02

## 2025-06-02 PROBLEM — Z63.6 DEPENDENT RELATIVE NEEDING CARE AT HOME: Status: ACTIVE | Noted: 2025-06-02

## 2025-06-02 LAB
ANION GAP SERPL CALCULATED.3IONS-SCNC: 9 MMOL/L (ref 7–15)
BASOPHILS # BLD AUTO: 0 10E3/UL (ref 0–0.2)
BASOPHILS NFR BLD AUTO: 0 %
BUN SERPL-MCNC: 24.6 MG/DL (ref 8–23)
CALCIUM SERPL-MCNC: 8.6 MG/DL (ref 8.8–10.4)
CHLORIDE SERPL-SCNC: 101 MMOL/L (ref 98–107)
CREAT SERPL-MCNC: 0.98 MG/DL (ref 0.67–1.17)
EGFRCR SERPLBLD CKD-EPI 2021: 72 ML/MIN/1.73M2
EOSINOPHIL # BLD AUTO: 0.2 10E3/UL (ref 0–0.7)
EOSINOPHIL NFR BLD AUTO: 2 %
ERYTHROCYTE [DISTWIDTH] IN BLOOD BY AUTOMATED COUNT: 14.5 % (ref 10–15)
GLUCOSE SERPL-MCNC: 108 MG/DL (ref 70–99)
HCO3 SERPL-SCNC: 27 MMOL/L (ref 22–29)
HCT VFR BLD AUTO: 34.4 % (ref 40–53)
HGB BLD-MCNC: 10.9 G/DL (ref 13.3–17.7)
HOLD SPECIMEN: NORMAL
IMM GRANULOCYTES # BLD: 0 10E3/UL
IMM GRANULOCYTES NFR BLD: 0 %
LYMPHOCYTES # BLD AUTO: 1.3 10E3/UL (ref 0.8–5.3)
LYMPHOCYTES NFR BLD AUTO: 15 %
MCH RBC QN AUTO: 27 PG (ref 26.5–33)
MCHC RBC AUTO-ENTMCNC: 31.7 G/DL (ref 31.5–36.5)
MCV RBC AUTO: 85 FL (ref 78–100)
MONOCYTES # BLD AUTO: 0.7 10E3/UL (ref 0–1.3)
MONOCYTES NFR BLD AUTO: 8 %
NEUTROPHILS # BLD AUTO: 6.5 10E3/UL (ref 1.6–8.3)
NEUTROPHILS NFR BLD AUTO: 74 %
NRBC # BLD AUTO: 0 10E3/UL
NRBC BLD AUTO-RTO: 0 /100
PLATELET # BLD AUTO: 162 10E3/UL (ref 150–450)
POTASSIUM SERPL-SCNC: 4.1 MMOL/L (ref 3.4–5.3)
RBC # BLD AUTO: 4.03 10E6/UL (ref 4.4–5.9)
SODIUM SERPL-SCNC: 137 MMOL/L (ref 135–145)
WBC # BLD AUTO: 8.8 10E3/UL (ref 4–11)

## 2025-06-02 PROCEDURE — 36415 COLL VENOUS BLD VENIPUNCTURE: CPT

## 2025-06-02 PROCEDURE — 85014 HEMATOCRIT: CPT

## 2025-06-02 PROCEDURE — 93010 ELECTROCARDIOGRAM REPORT: CPT

## 2025-06-02 PROCEDURE — 99284 EMERGENCY DEPT VISIT MOD MDM: CPT

## 2025-06-02 PROCEDURE — 93005 ELECTROCARDIOGRAM TRACING: CPT

## 2025-06-02 PROCEDURE — 80051 ELECTROLYTE PANEL: CPT

## 2025-06-02 PROCEDURE — 99283 EMERGENCY DEPT VISIT LOW MDM: CPT

## 2025-06-02 ASSESSMENT — ACTIVITIES OF DAILY LIVING (ADL)
ADLS_ACUITY_SCORE: 54

## 2025-06-02 ASSESSMENT — COLUMBIA-SUICIDE SEVERITY RATING SCALE - C-SSRS
1. IN THE PAST MONTH, HAVE YOU WISHED YOU WERE DEAD OR WISHED YOU COULD GO TO SLEEP AND NOT WAKE UP?: NO
2. HAVE YOU ACTUALLY HAD ANY THOUGHTS OF KILLING YOURSELF IN THE PAST MONTH?: NO
6. HAVE YOU EVER DONE ANYTHING, STARTED TO DO ANYTHING, OR PREPARED TO DO ANYTHING TO END YOUR LIFE?: NO

## 2025-06-02 NOTE — ED PROVIDER NOTES
[facility]  Emergency Department Visit Note    PATIENT:  Angelica Maharaj     89 year old     male      [id number]    Chief complaint:  Chief Complaint   Patient presents with    Seizures     Possible seizure type activity per marifer.  A&O now without complaints          History of present illness:  Patient is a 89 year old male with AAA status post endovascular repair (2011), CAD status post CABG, DVT on apixaban presenting for evaluation of shaking episode.    I reviewed discharge summary 4/21/2025.  Admitted at that time for DVT.  Initially started on apixaban, though due to PT/OT evaluation and risk factors including age and fall risk decision made to forego anticoagulation and plan for serial ultrasound with DVT surveillance.    Here today from [facility] TCU.  EMS report staff there thought patient was about to pass out and then had shaking episode.  This seems to have lasted roughly 3 minutes.  No known seizure history.  No loss of bowel or bladder continence.    He has no concerns for me.  No lightheadedness or dizziness, chest pain, shortness of breath, abdominal pain, extremity numbness, tingling, or weakness      Review of Systems:  As in HPI above    BP (!) 125/98   Pulse 63   Temp 98.3  F (36.8  C) (Oral)   Resp 14   SpO2 95%     Physical Exam:  Constitutional: laying in hospital bed and asleep, awakens to voice, oriented to person and place but thought it was 1992, thought it was January or February.  Not in distress.  HEENT: normocephalic, atraumatic, pupils 3mm, equal, round, and reactive to light, and sclerae anicteric  Neck: no stridor  Cardiovascular: regular rate and rhythm and no murmurs, rubs, or extra heart sounds  Pulmonary: breathing comfortably on room air and lungs clear to auscultation bilaterally  Abdominal: soft, non-tender, non-distended  Extremities/MSK: Ulcerations over left great and middle toe on the dorsum, no erythema extending more proximally up the foot  but there is surrounding redness over the ulcerations.  Skin: warm, dry  Neurologic: moves all four extremities spontaneously, GCS 15 within limitations of likely baseline cognitive issues, CNII-XII intact, 5/5  strength bilaterally, 5/5 strength in dorsiflexion and plantarflexion bilaterally, sensation intact to light touch in bilateral upper and lower extremities, and no dysmetria on finger-nose-finger  Psychiatric: calm, appropriate      MDM:  Patient is a 89 year old male with above history presenting for evaluation of shaking episode.    Vitals are overall reassuring. Exam similarly reassuring, he has no focal neurologic deficit, breathing comfortably.    Not sure what to make of the episode earlier today.  He has no known seizure history.  Syncope is possible so planning for brief workup for this.  No deficit concerning for stroke.  No reported trauma.    He does need to see a podiatrist for his foot issues which appear chronic, no acute infectious signs or symptoms currently.    Disposition pending above workup. Remainder of ED course below.    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  No concerning electrolyte abnormalities.  Creatinine normal.   0752 Syncope workup overall is reassuring.  Nothing here to suggest seizure.  Stable for outpatient follow-up.  Recommend close follow-up with primary at the TCU and with podiatry.  Referral placed.  ED return the event of new or worsening symptoms.       Encounter Diagnoses:  Final diagnoses:    Episode of shaking   Ulcer of toe of left foot, unspecified ulcer stage (H)       Final disposition: discharge    Luis Forte MD  6/2/2025  7:39 AM   Emergency Medicine  [facility]      Luis Forte MD  06/02/25 0849

## 2025-06-02 NOTE — ED NOTES
Assuming care for this patient. States he is not in pain. Vitals stable, lungs clear. Breakfast ordered.

## 2025-06-02 NOTE — ED NOTES
Discharge orders are in place, report called to [facility] staff. Waiting for transportation. Patient eating breakfast as of now.

## 2025-06-02 NOTE — ED TRIAGE NOTES
Patient at [facility], staff believed patient was passing out and then had shaking, believed patient had seizure like activity x3 minutes, no loss of bowel or bladder, patient in triage A&Ox3, no seizure history.  Is on Eliquis for hx of A-fib     Triage Assessment (Adult)       Row Name 06/02/25 0637          Triage Assessment    Airway WDL WDL        Respiratory WDL    Respiratory WDL WDL        Skin Circulation/Temperature WDL    Skin Circulation/Temperature WDL WDL        Cardiac WDL    Cardiac WDL X  baseline     Cardiac Rhythm Atrial fibrillation        Peripheral/Neurovascular WDL    Peripheral Neurovascular WDL WDL        Cognitive/Neuro/Behavioral WDL    Cognitive/Neuro/Behavioral WDL WDL

## 2025-06-02 NOTE — ED NOTES
6/2/2025  10:08 AM    Called [facility] and left message for TCU. Asking for fax number, discharge instructions left in ED.

## 2025-06-02 NOTE — DISCHARGE INSTRUCTIONS
You were seen in the emergency department today for shaking episode. We did tests including blood tests and ECG that showed no clear cause for your symptoms, but was reassuring against a life-threatening cause at this time.     Please follow up with your primary doctor in the next 1 to 2 weeks for ongoing evaluation and management of your symptoms.    You did have some ulcerations on your left foot.  You should meet with the podiatrist/foot doctor for further evaluation of this.  I gave you a referral.    Return to the emergency department with new or worsening symptoms that you find concerning.

## 2025-06-03 LAB
ATRIAL RATE - MUSE: NORMAL BPM
DIASTOLIC BLOOD PRESSURE - MUSE: NORMAL MMHG
INTERPRETATION ECG - MUSE: NORMAL
P AXIS - MUSE: NORMAL DEGREES
PR INTERVAL - MUSE: NORMAL MS
QRS DURATION - MUSE: 144 MS
QT - MUSE: 452 MS
QTC - MUSE: 458 MS
R AXIS - MUSE: -54 DEGREES
SYSTOLIC BLOOD PRESSURE - MUSE: NORMAL MMHG
T AXIS - MUSE: -10 DEGREES
VENTRICULAR RATE- MUSE: 62 BPM

## 2025-06-04 ENCOUNTER — DOCUMENTATION ONLY (OUTPATIENT)
Dept: OTHER | Facility: CLINIC | Age: OVER 89
End: 2025-06-04
Payer: COMMERCIAL

## 2025-07-01 ENCOUNTER — APPOINTMENT (OUTPATIENT)
Dept: ULTRASOUND IMAGING | Facility: CLINIC | Age: OVER 89
End: 2025-07-01
Attending: FAMILY MEDICINE
Payer: COMMERCIAL

## 2025-07-01 ENCOUNTER — HOSPITAL ENCOUNTER (EMERGENCY)
Facility: CLINIC | Age: OVER 89
Discharge: OTHER INSTITUTION | End: 2025-07-01
Attending: FAMILY MEDICINE
Payer: COMMERCIAL

## 2025-07-01 ENCOUNTER — APPOINTMENT (OUTPATIENT)
Dept: GENERAL RADIOLOGY | Facility: CLINIC | Age: OVER 89
End: 2025-07-01
Attending: FAMILY MEDICINE
Payer: COMMERCIAL

## 2025-07-01 ENCOUNTER — MEDICAL CORRESPONDENCE (OUTPATIENT)
Dept: HEALTH INFORMATION MANAGEMENT | Facility: CLINIC | Age: OVER 89
End: 2025-07-01

## 2025-07-01 ENCOUNTER — HOSPITAL ENCOUNTER (INPATIENT)
Facility: CLINIC | Age: OVER 89
End: 2025-07-01
Attending: HOSPITALIST | Admitting: INTERNAL MEDICINE
Payer: COMMERCIAL

## 2025-07-01 ENCOUNTER — APPOINTMENT (OUTPATIENT)
Dept: CT IMAGING | Facility: CLINIC | Age: OVER 89
End: 2025-07-01
Attending: FAMILY MEDICINE
Payer: COMMERCIAL

## 2025-07-01 VITALS
RESPIRATION RATE: 18 BRPM | OXYGEN SATURATION: 91 % | TEMPERATURE: 98.1 F | DIASTOLIC BLOOD PRESSURE: 52 MMHG | SYSTOLIC BLOOD PRESSURE: 113 MMHG | HEART RATE: 71 BPM | WEIGHT: 189 LBS | BODY MASS INDEX: 27.91 KG/M2

## 2025-07-01 DIAGNOSIS — M86.9 OSTEOMYELITIS OF GREAT TOE OF LEFT FOOT (H): ICD-10-CM

## 2025-07-01 DIAGNOSIS — I73.9 PAD (PERIPHERAL ARTERY DISEASE): Primary | ICD-10-CM

## 2025-07-01 DIAGNOSIS — R52 PAIN: ICD-10-CM

## 2025-07-01 DIAGNOSIS — I96 DRY GANGRENE (H): ICD-10-CM

## 2025-07-01 DIAGNOSIS — L03.116 CELLULITIS OF LEFT LOWER EXTREMITY: ICD-10-CM

## 2025-07-01 DIAGNOSIS — I73.9 PAD (PERIPHERAL ARTERY DISEASE): ICD-10-CM

## 2025-07-01 DIAGNOSIS — L97.529 TOE ULCER, LEFT, WITH UNSPECIFIED SEVERITY (H): ICD-10-CM

## 2025-07-01 DIAGNOSIS — L03.90 CELLULITIS, UNSPECIFIED CELLULITIS SITE: ICD-10-CM

## 2025-07-01 DIAGNOSIS — I96 GANGRENE OF TOE OF LEFT FOOT (H): ICD-10-CM

## 2025-07-01 DIAGNOSIS — K59.00 CONSTIPATION, UNSPECIFIED CONSTIPATION TYPE: ICD-10-CM

## 2025-07-01 LAB
ALBUMIN SERPL BCG-MCNC: 3.3 G/DL (ref 3.5–5.2)
ALP SERPL-CCNC: 79 U/L (ref 40–150)
ALT SERPL W P-5'-P-CCNC: 14 U/L (ref 0–70)
ANION GAP SERPL CALCULATED.3IONS-SCNC: 11 MMOL/L (ref 7–15)
APTT PPP: 34 SECONDS (ref 22–38)
AST SERPL W P-5'-P-CCNC: 36 U/L (ref 0–45)
ATRIAL RATE - MUSE: NORMAL BPM
BASOPHILS # BLD AUTO: 0 10E3/UL (ref 0–0.2)
BASOPHILS NFR BLD AUTO: 0 %
BILIRUB SERPL-MCNC: 0.4 MG/DL
BUN SERPL-MCNC: 33.6 MG/DL (ref 8–23)
CALCIUM SERPL-MCNC: 8.9 MG/DL (ref 8.8–10.4)
CHLORIDE SERPL-SCNC: 101 MMOL/L (ref 98–107)
CREAT SERPL-MCNC: 1.2 MG/DL (ref 0.67–1.17)
DIASTOLIC BLOOD PRESSURE - MUSE: NORMAL MMHG
EGFRCR SERPLBLD CKD-EPI 2021: 57 ML/MIN/1.73M2
EOSINOPHIL # BLD AUTO: 0.2 10E3/UL (ref 0–0.7)
EOSINOPHIL NFR BLD AUTO: 2 %
ERYTHROCYTE [DISTWIDTH] IN BLOOD BY AUTOMATED COUNT: 14.9 % (ref 10–15)
GLUCOSE SERPL-MCNC: 110 MG/DL (ref 70–99)
HCO3 SERPL-SCNC: 26 MMOL/L (ref 22–29)
HCT VFR BLD AUTO: 34.1 % (ref 40–53)
HGB BLD-MCNC: 10.9 G/DL (ref 13.3–17.7)
IMM GRANULOCYTES # BLD: 0 10E3/UL
IMM GRANULOCYTES NFR BLD: 0 %
INR PPP: 1.6 (ref 0.85–1.15)
INTERPRETATION ECG - MUSE: NORMAL
LACTATE SERPL-SCNC: 1.1 MMOL/L (ref 0.7–2)
LYMPHOCYTES # BLD AUTO: 1.6 10E3/UL (ref 0.8–5.3)
LYMPHOCYTES NFR BLD AUTO: 20 %
MCH RBC QN AUTO: 27 PG (ref 26.5–33)
MCHC RBC AUTO-ENTMCNC: 32 G/DL (ref 31.5–36.5)
MCV RBC AUTO: 84 FL (ref 78–100)
MONOCYTES # BLD AUTO: 0.6 10E3/UL (ref 0–1.3)
MONOCYTES NFR BLD AUTO: 8 %
NEUTROPHILS # BLD AUTO: 5.5 10E3/UL (ref 1.6–8.3)
NEUTROPHILS NFR BLD AUTO: 69 %
NRBC # BLD AUTO: 0 10E3/UL
NRBC BLD AUTO-RTO: 0 /100
P AXIS - MUSE: NORMAL DEGREES
PLATELET # BLD AUTO: 233 10E3/UL (ref 150–450)
POTASSIUM SERPL-SCNC: 4.4 MMOL/L (ref 3.4–5.3)
PR INTERVAL - MUSE: NORMAL MS
PROT SERPL-MCNC: 6.9 G/DL (ref 6.4–8.3)
PROTHROMBIN TIME: 18.7 SECONDS (ref 11.8–14.8)
QRS DURATION - MUSE: 128 MS
QT - MUSE: 440 MS
QTC - MUSE: 464 MS
R AXIS - MUSE: -54 DEGREES
RBC # BLD AUTO: 4.04 10E6/UL (ref 4.4–5.9)
SODIUM SERPL-SCNC: 138 MMOL/L (ref 135–145)
SYSTOLIC BLOOD PRESSURE - MUSE: NORMAL MMHG
T AXIS - MUSE: -12 DEGREES
VENTRICULAR RATE- MUSE: 67 BPM
WBC # BLD AUTO: 8 10E3/UL (ref 4–11)

## 2025-07-01 PROCEDURE — 82310 ASSAY OF CALCIUM: CPT | Performed by: FAMILY MEDICINE

## 2025-07-01 PROCEDURE — 96361 HYDRATE IV INFUSION ADD-ON: CPT | Performed by: FAMILY MEDICINE

## 2025-07-01 PROCEDURE — 75635 CT ANGIO ABDOMINAL ARTERIES: CPT

## 2025-07-01 PROCEDURE — 96365 THER/PROPH/DIAG IV INF INIT: CPT | Performed by: FAMILY MEDICINE

## 2025-07-01 PROCEDURE — 258N000003 HC RX IP 258 OP 636: Performed by: INTERNAL MEDICINE

## 2025-07-01 PROCEDURE — 96368 THER/DIAG CONCURRENT INF: CPT | Performed by: FAMILY MEDICINE

## 2025-07-01 PROCEDURE — 93010 ELECTROCARDIOGRAM REPORT: CPT | Performed by: FAMILY MEDICINE

## 2025-07-01 PROCEDURE — 258N000003 HC RX IP 258 OP 636: Performed by: FAMILY MEDICINE

## 2025-07-01 PROCEDURE — 85004 AUTOMATED DIFF WBC COUNT: CPT | Performed by: FAMILY MEDICINE

## 2025-07-01 PROCEDURE — 250N000011 HC RX IP 250 OP 636: Performed by: FAMILY MEDICINE

## 2025-07-01 PROCEDURE — 87040 BLOOD CULTURE FOR BACTERIA: CPT | Performed by: FAMILY MEDICINE

## 2025-07-01 PROCEDURE — 250N000011 HC RX IP 250 OP 636: Performed by: INTERNAL MEDICINE

## 2025-07-01 PROCEDURE — 93005 ELECTROCARDIOGRAM TRACING: CPT | Performed by: FAMILY MEDICINE

## 2025-07-01 PROCEDURE — 250N000009 HC RX 250: Performed by: FAMILY MEDICINE

## 2025-07-01 PROCEDURE — 85730 THROMBOPLASTIN TIME PARTIAL: CPT | Performed by: FAMILY MEDICINE

## 2025-07-01 PROCEDURE — 82374 ASSAY BLOOD CARBON DIOXIDE: CPT | Performed by: FAMILY MEDICINE

## 2025-07-01 PROCEDURE — 99285 EMERGENCY DEPT VISIT HI MDM: CPT | Mod: 25 | Performed by: FAMILY MEDICINE

## 2025-07-01 PROCEDURE — 120N000001 HC R&B MED SURG/OB

## 2025-07-01 PROCEDURE — 85018 HEMOGLOBIN: CPT | Performed by: FAMILY MEDICINE

## 2025-07-01 PROCEDURE — 83605 ASSAY OF LACTIC ACID: CPT | Performed by: FAMILY MEDICINE

## 2025-07-01 PROCEDURE — 99284 EMERGENCY DEPT VISIT MOD MDM: CPT | Performed by: FAMILY MEDICINE

## 2025-07-01 PROCEDURE — 96366 THER/PROPH/DIAG IV INF ADDON: CPT | Performed by: FAMILY MEDICINE

## 2025-07-01 PROCEDURE — 85610 PROTHROMBIN TIME: CPT | Performed by: FAMILY MEDICINE

## 2025-07-01 PROCEDURE — 96375 TX/PRO/DX INJ NEW DRUG ADDON: CPT | Mod: 59 | Performed by: FAMILY MEDICINE

## 2025-07-01 PROCEDURE — 250N000013 HC RX MED GY IP 250 OP 250 PS 637: Performed by: FAMILY MEDICINE

## 2025-07-01 PROCEDURE — 73630 X-RAY EXAM OF FOOT: CPT | Mod: LT

## 2025-07-01 PROCEDURE — 93971 EXTREMITY STUDY: CPT | Mod: LT

## 2025-07-01 PROCEDURE — 36415 COLL VENOUS BLD VENIPUNCTURE: CPT | Performed by: FAMILY MEDICINE

## 2025-07-01 PROCEDURE — 99223 1ST HOSP IP/OBS HIGH 75: CPT | Performed by: INTERNAL MEDICINE

## 2025-07-01 RX ORDER — HYDRALAZINE HYDROCHLORIDE 10 MG/1
10 TABLET, FILM COATED ORAL EVERY 4 HOURS PRN
Status: ACTIVE | OUTPATIENT
Start: 2025-07-01

## 2025-07-01 RX ORDER — ACETAMINOPHEN 325 MG/1
650 TABLET ORAL EVERY 4 HOURS PRN
Status: DISPENSED | OUTPATIENT
Start: 2025-07-01

## 2025-07-01 RX ORDER — HYDRALAZINE HYDROCHLORIDE 20 MG/ML
10 INJECTION INTRAMUSCULAR; INTRAVENOUS EVERY 4 HOURS PRN
Status: ACTIVE | OUTPATIENT
Start: 2025-07-01

## 2025-07-01 RX ORDER — ONDANSETRON 2 MG/ML
4 INJECTION INTRAMUSCULAR; INTRAVENOUS EVERY 6 HOURS PRN
Status: ACTIVE | OUTPATIENT
Start: 2025-07-01

## 2025-07-01 RX ORDER — ACETAMINOPHEN 650 MG/1
650 SUPPOSITORY RECTAL EVERY 4 HOURS PRN
Status: ACTIVE | OUTPATIENT
Start: 2025-07-01

## 2025-07-01 RX ORDER — HYDROMORPHONE HCL IN WATER/PF 6 MG/30 ML
0.4 PATIENT CONTROLLED ANALGESIA SYRINGE INTRAVENOUS
Refills: 0 | Status: DISCONTINUED | OUTPATIENT
Start: 2025-07-01 | End: 2025-07-02

## 2025-07-01 RX ORDER — IOPAMIDOL 755 MG/ML
100 INJECTION, SOLUTION INTRAVASCULAR ONCE
Status: COMPLETED | OUTPATIENT
Start: 2025-07-01 | End: 2025-07-01

## 2025-07-01 RX ORDER — NALOXONE HYDROCHLORIDE 0.4 MG/ML
0.2 INJECTION, SOLUTION INTRAMUSCULAR; INTRAVENOUS; SUBCUTANEOUS
Status: ACTIVE | OUTPATIENT
Start: 2025-07-01

## 2025-07-01 RX ORDER — OXYCODONE HYDROCHLORIDE 5 MG/1
5 TABLET ORAL ONCE
Refills: 0 | Status: COMPLETED | OUTPATIENT
Start: 2025-07-01 | End: 2025-07-01

## 2025-07-01 RX ORDER — BISACODYL 10 MG
10 SUPPOSITORY, RECTAL RECTAL DAILY PRN
Status: ACTIVE | OUTPATIENT
Start: 2025-07-01

## 2025-07-01 RX ORDER — DOXAZOSIN 4 MG/1
8 TABLET ORAL AT BEDTIME
Status: DISPENSED | OUTPATIENT
Start: 2025-07-01

## 2025-07-01 RX ORDER — ATORVASTATIN CALCIUM 40 MG/1
40 TABLET, FILM COATED ORAL AT BEDTIME
Status: DISPENSED | OUTPATIENT
Start: 2025-07-01

## 2025-07-01 RX ORDER — AMOXICILLIN 250 MG
2 CAPSULE ORAL 2 TIMES DAILY PRN
Status: ACTIVE | OUTPATIENT
Start: 2025-07-01

## 2025-07-01 RX ORDER — ONDANSETRON 4 MG/1
4 TABLET, ORALLY DISINTEGRATING ORAL EVERY 6 HOURS PRN
Status: ACTIVE | OUTPATIENT
Start: 2025-07-01

## 2025-07-01 RX ORDER — SODIUM CHLORIDE 9 MG/ML
1000 INJECTION, SOLUTION INTRAVENOUS CONTINUOUS
Status: DISCONTINUED | OUTPATIENT
Start: 2025-07-01 | End: 2025-07-01 | Stop reason: HOSPADM

## 2025-07-01 RX ORDER — OXYCODONE HYDROCHLORIDE 5 MG/1
5 TABLET ORAL EVERY 4 HOURS PRN
Refills: 0 | Status: DISCONTINUED | OUTPATIENT
Start: 2025-07-01 | End: 2025-07-02

## 2025-07-01 RX ORDER — METOPROLOL TARTRATE 25 MG/1
25 TABLET, FILM COATED ORAL 2 TIMES DAILY
Status: DISPENSED | OUTPATIENT
Start: 2025-07-01

## 2025-07-01 RX ORDER — CEFEPIME HYDROCHLORIDE 2 G/1
2 INJECTION, POWDER, FOR SOLUTION INTRAVENOUS EVERY 12 HOURS
Status: DISPENSED | OUTPATIENT
Start: 2025-07-01

## 2025-07-01 RX ORDER — VANCOMYCIN HYDROCHLORIDE 1 G/200ML
1000 INJECTION, SOLUTION INTRAVENOUS EVERY 24 HOURS
Status: DISCONTINUED | OUTPATIENT
Start: 2025-07-02 | End: 2025-07-01 | Stop reason: HOSPADM

## 2025-07-01 RX ORDER — AMOXICILLIN 250 MG
1 CAPSULE ORAL 2 TIMES DAILY PRN
Status: ACTIVE | OUTPATIENT
Start: 2025-07-01

## 2025-07-01 RX ORDER — METRONIDAZOLE 500 MG/100ML
500 INJECTION, SOLUTION INTRAVENOUS EVERY 8 HOURS
Status: DISPENSED | OUTPATIENT
Start: 2025-07-01

## 2025-07-01 RX ORDER — NALOXONE HYDROCHLORIDE 0.4 MG/ML
0.4 INJECTION, SOLUTION INTRAMUSCULAR; INTRAVENOUS; SUBCUTANEOUS
Status: ACTIVE | OUTPATIENT
Start: 2025-07-01

## 2025-07-01 RX ORDER — SODIUM CHLORIDE, SODIUM LACTATE, POTASSIUM CHLORIDE, CALCIUM CHLORIDE 600; 310; 30; 20 MG/100ML; MG/100ML; MG/100ML; MG/100ML
INJECTION, SOLUTION INTRAVENOUS CONTINUOUS
Status: ACTIVE | OUTPATIENT
Start: 2025-07-01

## 2025-07-01 RX ORDER — PROCHLORPERAZINE MALEATE 5 MG/1
5 TABLET ORAL EVERY 6 HOURS PRN
Status: ACTIVE | OUTPATIENT
Start: 2025-07-01

## 2025-07-01 RX ORDER — LISINOPRIL 5 MG/1
15 TABLET ORAL DAILY
Status: ON HOLD | COMMUNITY

## 2025-07-01 RX ORDER — HYDROCHLOROTHIAZIDE 12.5 MG/1
12.5 TABLET ORAL DAILY
Status: ACTIVE | OUTPATIENT
Start: 2025-07-02

## 2025-07-01 RX ORDER — ASPIRIN 81 MG/1
81 TABLET ORAL DAILY
Status: DISPENSED | OUTPATIENT
Start: 2025-07-02

## 2025-07-01 RX ORDER — PIPERACILLIN SODIUM, TAZOBACTAM SODIUM 3; .375 G/15ML; G/15ML
3.38 INJECTION, POWDER, LYOPHILIZED, FOR SOLUTION INTRAVENOUS EVERY 6 HOURS
Status: DISCONTINUED | OUTPATIENT
Start: 2025-07-01 | End: 2025-07-01 | Stop reason: HOSPADM

## 2025-07-01 RX ORDER — HYDROMORPHONE HCL IN WATER/PF 6 MG/30 ML
0.2 PATIENT CONTROLLED ANALGESIA SYRINGE INTRAVENOUS
Refills: 0 | Status: DISCONTINUED | OUTPATIENT
Start: 2025-07-01 | End: 2025-07-02

## 2025-07-01 RX ORDER — LIDOCAINE 40 MG/G
CREAM TOPICAL
Status: ACTIVE | OUTPATIENT
Start: 2025-07-01

## 2025-07-01 RX ORDER — HYDROMORPHONE HYDROCHLORIDE 1 MG/ML
0.3 INJECTION, SOLUTION INTRAMUSCULAR; INTRAVENOUS; SUBCUTANEOUS
Status: DISCONTINUED | OUTPATIENT
Start: 2025-07-01 | End: 2025-07-01 | Stop reason: HOSPADM

## 2025-07-01 RX ORDER — POLYETHYLENE GLYCOL 3350 17 G/17G
17 POWDER, FOR SOLUTION ORAL 2 TIMES DAILY PRN
Status: ACTIVE | OUTPATIENT
Start: 2025-07-01

## 2025-07-01 RX ORDER — ACETAMINOPHEN 325 MG/1
650 TABLET ORAL ONCE
Status: COMPLETED | OUTPATIENT
Start: 2025-07-01 | End: 2025-07-01

## 2025-07-01 RX ADMIN — HYDROMORPHONE HYDROCHLORIDE 0.3 MG: 1 INJECTION, SOLUTION INTRAMUSCULAR; INTRAVENOUS; SUBCUTANEOUS at 14:12

## 2025-07-01 RX ADMIN — PIPERACILLIN AND TAZOBACTAM 3.38 G: 3; .375 INJECTION, POWDER, FOR SOLUTION INTRAVENOUS at 12:32

## 2025-07-01 RX ADMIN — SODIUM CHLORIDE 500 ML: 0.9 INJECTION, SOLUTION INTRAVENOUS at 14:13

## 2025-07-01 RX ADMIN — SODIUM CHLORIDE, SODIUM LACTATE, POTASSIUM CHLORIDE, AND CALCIUM CHLORIDE: .6; .31; .03; .02 INJECTION, SOLUTION INTRAVENOUS at 22:25

## 2025-07-01 RX ADMIN — ACETAMINOPHEN 650 MG: 325 TABLET ORAL at 10:14

## 2025-07-01 RX ADMIN — SODIUM CHLORIDE 1000 ML: 0.9 INJECTION, SOLUTION INTRAVENOUS at 11:47

## 2025-07-01 RX ADMIN — IOPAMIDOL 100 ML: 755 INJECTION, SOLUTION INTRAVENOUS at 11:22

## 2025-07-01 RX ADMIN — OXYCODONE HYDROCHLORIDE 5 MG: 5 TABLET ORAL at 10:14

## 2025-07-01 RX ADMIN — SODIUM CHLORIDE 100 ML: 9 INJECTION, SOLUTION INTRAVENOUS at 11:22

## 2025-07-01 RX ADMIN — PIPERACILLIN AND TAZOBACTAM 3.38 G: 3; .375 INJECTION, POWDER, FOR SOLUTION INTRAVENOUS at 17:03

## 2025-07-01 RX ADMIN — CEFEPIME 2 G: 2 INJECTION, POWDER, FOR SOLUTION INTRAVENOUS at 22:25

## 2025-07-01 RX ADMIN — Medication 1250 MG: at 11:05

## 2025-07-01 RX ADMIN — SODIUM CHLORIDE 500 ML: 0.9 INJECTION, SOLUTION INTRAVENOUS at 10:13

## 2025-07-01 ASSESSMENT — ACTIVITIES OF DAILY LIVING (ADL)
ADLS_ACUITY_SCORE: 54

## 2025-07-01 ASSESSMENT — COLUMBIA-SUICIDE SEVERITY RATING SCALE - C-SSRS
1. IN THE PAST MONTH, HAVE YOU WISHED YOU WERE DEAD OR WISHED YOU COULD GO TO SLEEP AND NOT WAKE UP?: NO
6. HAVE YOU EVER DONE ANYTHING, STARTED TO DO ANYTHING, OR PREPARED TO DO ANYTHING TO END YOUR LIFE?: NO
2. HAVE YOU ACTUALLY HAD ANY THOUGHTS OF KILLING YOURSELF IN THE PAST MONTH?: NO

## 2025-07-01 NOTE — PHARMACY-VANCOMYCIN DOSING SERVICE
Pharmacy Vancomycin Initial Note  Date of Service 2025  Patient's  11/10/1932  89 year old, male    Indication: Skin and Soft Tissue Infection    Current estimated CrCl = Estimated Creatinine Clearance: 42.6 mL/min (A) (based on SCr of 1.2 mg/dL (H)).    Creatinine for last 3 days  2025: 10:06 AM Creatinine 1.20 mg/dL    Recent Vancomycin Level(s) for last 3 days  No results found for requested labs within last 3 days.      Vancomycin IV Administrations (past 72 hours)        No vancomycin orders with administrations in past 72 hours.                    Nephrotoxins and other renal medications (From now, onward)      Start     Dose/Rate Route Frequency Ordered Stop    25 1015  vancomycin (VANCOCIN) 1,250 mg in 0.9% NaCl 250 mL intermittent infusion         1,250 mg  over 90 Minutes Intravenous EVERY 24 HOURS 25 1010      25 1010  piperacillin-tazobactam (ZOSYN) 3.375 g vial to attach to  mL bag         3.375 g  over 30 Minutes Intravenous EVERY 6 HOURS 25 1005              Contrast Orders - past 72 hours (72h ago, onward)      Start     Dose/Rate Route Frequency Stop    25 1040  iopamidol (ISOVUE-370) solution 100 mL         100 mL Intravenous ONCE              [employer] Prediction of Planned Initial Vancomycin Regimen  Loading dose: 1250 mg at 10:30 2025.  Regimen: 1000 mg IV every 24 hours.  Start time: 10:30 on 2025  Exposure target: AUC24 (range) 400-600 mg/L.hr   AUC24,ss: 464 mg/L.hr  Probability of AUC24 > 400: 66 %  Ctrough,ss: 14.8 mg/L  Probability of Ctrough,ss > 20: 25 %  Probability of nephrotoxicity (Lodise GARY ): 10 %          Plan:  Start vancomycin  1250 mg IV load, then  1000 mg Q24h   Vancomycin monitoring method: AUC  Vancomycin therapeutic monitoring goal: 400-600 mg*h/L  Pharmacy will check vancomycin levels as appropriate in 1-3 Days.    Serum creatinine levels will be ordered daily for the first week of therapy and at least  twice weekly for subsequent weeks.      Noy Cuellar, RPH

## 2025-07-01 NOTE — ED NOTES
ALYSSA Monson called and updated on pt's transfer to [facility]; pt left via EMS at 1845.   [facility] called and made aware.

## 2025-07-01 NOTE — ED PROVIDER NOTES
History     Chief Complaint   Patient presents with    Wound Infection     Left great toe and second toe     HPI  Angelica Maharaj is a 89 year old male who [place]  Drainage and erythema.  No associated current fever possible low blood pressures at his nursing home arrived here with normal systolic pressures in the 130s 140s.  He is on chronic anticoagulation with Eliquis is also on aspirin 81 mg.  His med list as well as the wound and other nursing home data is imaged and shown in the examination section below.  Unclear how long this has been present.  Patient does not feel well.  He has pain associated             Allergies:  Allergies   Allergen Reactions    Alendronate     Simvastatin        Problem List:    Patient Active Problem List    Diagnosis Date Noted    Anemia 06/02/2025     Priority: Medium     Oct 23, 2017 Entered By: YAN STEVENSON Comment: Mild normocytic anemia      Benign essential hypertension 06/02/2025     Priority: Medium    Cough 06/02/2025     Priority: Medium     Oct 23, 2017 Entered By: YAN STEVENSON Comment: Dry daily cough      Cataract of both eyes 06/02/2025     Priority: Medium    Dependent relative needing care at home 06/02/2025     Priority: Medium    Degeneration of lumbar intervertebral disc 06/02/2025     Priority: Medium    Difficulty in walking, not elsewhere classified 06/02/2025     Priority: Medium    History of endovascular stent graft for abdominal aortic aneurysm (AAA) 06/02/2025     Priority: Medium     Aug 25, 2011 Entered By: ESTELLA NIETO Comment: goal BP &lt;=130/80 per vascular consult [facility] 1/2011      Hyperlipidemia 06/02/2025     Priority: Medium    Other psoriasis 06/02/2025     Priority: Medium    Weakness 04/18/2025     Priority: Medium    Fall at home, initial encounter 04/18/2025     Priority: Medium    BPH (benign prostatic hyperplasia) 04/11/2024     Priority: Medium    ASHD  (arteriosclerotic heart disease) 01/16/2014     Priority: Medium    Iron deficiency anemia 01/16/2014     Priority: Medium    S/P CABG (coronary artery bypass graft) 01/16/2014     Priority: Medium    Low back pain 01/31/2012     Priority: Medium        Past Medical History:    No past medical history on file.    Past Surgical History:    No past surgical history on file.    Family History:    No family history on file.    Social History:  Marital Status:  Single [1]        Medications:    aspirin 81 MG EC tablet  atorvastatin (LIPITOR) 40 MG tablet  doxazosin (CARDURA) 8 MG tablet  hydroCHLOROthiazide 12.5 MG tablet  HYDROcodone-acetaminophen (NORCO) 7.5-325 MG per tablet  metoprolol tartrate (LOPRESSOR) 25 MG tablet  sildenafil (VIAGRA) 100 MG tablet  vitamin B-12 (CYANOCOBALAMIN) 250 MCG tablet  Vitamin D3 (VITAMIN D, CHOLECALCIFEROL,) 25 mcg (1000 units) tablet          Review of Systems  ROS:  5 point ROS negative except as noted above in HPI, including Gen., Resp., CV, GI &  system review.      Physical Exam   BP: (!) 141/60  Pulse: 70  Temp: 98.1  F (36.7  C)  Resp: 18  Weight: 85.7 kg (189 lb)  SpO2: 95 %      Physical Exam  Constitutional:       General: He is in acute distress.      Appearance: He is not diaphoretic.   Eyes:      Conjunctiva/sclera: Conjunctivae normal.   Cardiovascular:      Rate and Rhythm: Normal rate and regular rhythm.      Heart sounds: No murmur heard.  Pulmonary:      Effort: Pulmonary effort is normal. No respiratory distress.      Breath sounds: No stridor. No wheezing.   Abdominal:      General: Abdomen is flat. There is no distension.      Palpations: Abdomen is soft. There is no mass.      Tenderness: There is no abdominal tenderness. There is no guarding.   Musculoskeletal:      Cervical back: Neck supple.      Right lower leg: No edema.      Left lower leg: No edema.   Skin:     Coloration: Skin is not pale.   Neurological:      General: No focal deficit present.       Mental Status: He is alert.     There is a wound as shown below that involves the great toe of the left foot with a bulbous swelling and necrotic tip black eschar and erythema surrounding the great toe and also the third toe.  No obvious streaking erythema.  I surprisingly felt that I could palpate the dorsalis pedis and a weaker posterior tibial pulse.  The toe was not pale.  His great toe is also held in hyperextension.                                    ED Course        Procedures                EKG Interpretation:      Interpreted by Tate Yen MD  EKG done at 1014 hrs. demonstrates a sinus rhythm at 67 bpm with a normal axis and no ST change.  There is a rapid R progression related to a right bundle branch block.  No ectopy.  Right bundle.  Impression right bundle branch block with a wide-complex.  Inferior Q's likely prior inferior MI.  No other significant acute changes.    Critical Care time:  none     IV Antibiotics given and/or elevated Lactate of 1.1 and no sepsis note found - Delete this reminder and enter the sepsis note or '.edcms' before signing chart.>>>None         Recent Results (from the past 24 hours)   CBC with platelets differential    Narrative    The following orders were created for panel order CBC with platelets differential.  Procedure                               Abnormality         Status                     ---------                               -----------         ------                     CBC with platelets and ...[[id number]]  Abnormal            Final result                 Please view results for these tests on the individual orders.   Comprehensive metabolic panel   Result Value Ref Range    Sodium 138 135 - 145 mmol/L    Potassium 4.4 3.4 - 5.3 mmol/L    Carbon Dioxide (CO2) 26 22 - 29 mmol/L    Anion Gap 11 7 - 15 mmol/L    Urea Nitrogen 33.6 (H) 8.0 - 23.0 mg/dL    Creatinine 1.20 (H) 0.67 - 1.17 mg/dL    GFR Estimate 57 (L) >60 mL/min/1.73m2    Calcium 8.9 8.8 - 10.4  mg/dL    Chloride 101 98 - 107 mmol/L    Glucose 110 (H) 70 - 99 mg/dL    Alkaline Phosphatase 79 40 - 150 U/L    AST 36 0 - 45 U/L    ALT 14 0 - 70 U/L    Protein Total 6.9 6.4 - 8.3 g/dL    Albumin 3.3 (L) 3.5 - 5.2 g/dL    Bilirubin Total 0.4 <=1.2 mg/dL   INR   Result Value Ref Range    INR 1.60 (H) 0.85 - 1.15    PT 18.7 (H) 11.8 - 14.8 Seconds   PTT   Result Value Ref Range    aPTT 34 22 - 38 Seconds   Lactic Acid Whole Blood with 1X Repeat in 2 HR when >2   Result Value Ref Range    Lactic Acid, Initial 1.1 0.7 - 2.0 mmol/L   CBC with platelets and differential   Result Value Ref Range    WBC Count 8.0 4.0 - 11.0 10e3/uL    RBC Count 4.04 (L) 4.40 - 5.90 10e6/uL    Hemoglobin 10.9 (L) 13.3 - 17.7 g/dL    Hematocrit 34.1 (L) 40.0 - 53.0 %    MCV 84 78 - 100 fL    MCH 27.0 26.5 - 33.0 pg    MCHC 32.0 31.5 - 36.5 g/dL    RDW 14.9 10.0 - 15.0 %    Platelet Count 233 150 - 450 10e3/uL    % Neutrophils 69 %    % Lymphocytes 20 %    % Monocytes 8 %    % Eosinophils 2 %    % Basophils 0 %    % Immature Granulocytes 0 %    NRBCs per 100 WBC 0 <1 /100    Absolute Neutrophils 5.5 1.6 - 8.3 10e3/uL    Absolute Lymphocytes 1.6 0.8 - 5.3 10e3/uL    Absolute Monocytes 0.6 0.0 - 1.3 10e3/uL    Absolute Eosinophils 0.2 0.0 - 0.7 10e3/uL    Absolute Basophils 0.0 0.0 - 0.2 10e3/uL    Absolute Immature Granulocytes 0.0 <=0.4 10e3/uL    Absolute NRBCs 0.0 10e3/uL   EKG 12 lead   Result Value Ref Range    Systolic Blood Pressure  mmHg    Diastolic Blood Pressure  mmHg    Ventricular Rate 67 BPM    Atrial Rate  BPM    IL Interval  ms    QRS Duration 128 ms     ms    QTc 464 ms    P Axis  degrees    R AXIS -54 degrees    T Axis -12 degrees    Interpretation ECG       Wide QRS rhythm  Left axis deviation  Right bundle branch block  Inferior infarct , age undetermined  Abnormal ECG  When compared with ECG of 02-Jun-2025 06:40,  No significant change was found     US Lower Extremity Venous Duplex Left    Narrative    EXAM: US  LOWER EXTREMITY VENOUS DUPLEX LEFT  LOCATION: [facility]  DATE: 7/1/2025    INDICATION: edema, necrotic leg wound.  edema  COMPARISON: None.  TECHNIQUE: Venous Duplex ultrasound of the left lower extremity with and without compression, augmentation and duplex. Color flow and spectral Doppler with waveform analysis performed.    FINDINGS: Exam includes the common femoral, femoral, popliteal, and contralateral common femoral veins as well as segmentally visualized deep calf veins and greater saphenous vein.     LEFT: No deep vein thrombosis. No superficial thrombophlebitis. No popliteal cyst.      Impression    IMPRESSION:  1.  No deep venous thrombosis in the left lower extremity.   CTA Abdomen Pelvis Runoff w Contrast    Narrative    EXAM: CTA ABDOMEN PELVIS RUNOFF W CONTRAST  LOCATION: [facility]  DATE: 7/1/2025    INDICATION: suspect critical limb ischemia and necrotic foot wound left leg.  s p abnormal MARISA in april  COMPARISON: CT of the abdomen and pelvis with lower extremity runoff dated 10/11/2023  TECHNIQUE: Helical acquisition through the abdomen, pelvis, and bilateral lower extremities was performed during the arterial phase of contrast enhancement using IV Contrast. 2D and 3D reconstructions were performed by the CT technologist. Dose reduction   techniques were used.   CONTRAST: 100 cc    FINDINGS:  AORTA: Again identified are changes consistent with endovascular repair of an infrarenal abdominal aortic aneurysm utilizing a bifurcated endograft. Again noted is an occlusion of the left limb which terminates in the distal abdominal aorta. The right   limb is patent without significant stenosis.    Again noted is an occlusion of the celiac trunk. Superior mesenteric artery is patent without significant stenosis. The origin of the inferior mesenteric artery is occluded. The renal arteries are patent.    Iliac arteries: The right common iliac artery  distal to the right limb of the endograft and the right external iliac artery are patent without significant stenoses. The right internal iliac artery is occluded.    The left common, external, and internal iliac arteries are occluded.    RIGHT LEG:   Common femoral artery: Calcified plaque. Severe stenosis just proximal to a right femoral to below-knee popliteal artery bypass graft.  Profunda femoris artery: No significant stenosis.  Superficial femoral artery: Long segment occlusion.  Popliteal artery: Occluded above the knee. Patent below the knee without significant stenosis.  Tibial arteries: Dominant runoff artery is the peroneal artery. There are multiple significant stenoses in the posterior tibial artery. There are multiple occlusions in the anterior tibial artery.    LEFT LEG:   Common femoral artery: Calcified plaque. No significant stenosis.  Profunda femoris artery: No definite significant stenosis.  Superficial femoral artery: Long segment occlusion  Right femoral to below-knee popliteal artery bypass graft: Partially obscured due to motion artifact. There appear to be significant stenoses within the graft at its mid distal aspects.  Popliteal artery: Poorly opacified and therefore cannot be adequately evaluated.  Tibial arteries: Primarily single vessel runoff via the peroneal artery.    LUNG BASES: No acute infiltrates.    ABDOMEN: Small cysts in the left lobe of liver. Again noted are bilateral adrenal nodules. These are unchanged in size. There are scattered colonic diverticuli. No evidence for acute diverticulitis. The prostate is enlarged in size. There are multilevel   degenerative changes in the lumbar spine.       Impression    IMPRESSION:  1.  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 Occluded left common, external, and internal iliac arteries.  2.  Occluded celiac trunk.  3.  Long segment occlusion of the right superficial femoral artery. Occluded above-knee right popliteal artery.  Patent right femoral to below-knee popliteal artery bypass graft. Significant stenosis in the common femoral artery just proximal to the   proximal anastomosis of this graft. This is not significantly changed when compared the prior exam.  4.  Long segment occlusion of the left superficial femoral artery. Partially obscured left femoral to below-knee popliteal artery bypass graft. There appear to be significant stenoses within the mid distal portions of the graft.  5.  Dominant runoff via the peroneal arteries bilaterally.   XR Foot Port Left 3 Views    Narrative    EXAM: XR FOOT PORT LEFT 3 VIEWS  LOCATION: [facility]  DATE: 7/1/2025    INDICATION: Left foot necrotic wound.  COMPARISON: None.      Impression    IMPRESSION:   Soft tissue swelling and bones are demineralized. There is some cortical irregularity along the distal tuft of the distal phalanx great toe, equivocal for osteomyelitis. If there is ulceration in this area and further evaluation is needed, recommend MRI.    Hammertoe configurations. No evidence of an acute displaced fracture. Atherosclerotic vascular calcifications.    NOTE: ABNORMAL REPORT    THE DICTATION ABOVE DESCRIBES AN ABNORMALITY FOR WHICH FOLLOW-UP IS NEEDED.         Medications   sodium chloride 0.9% BOLUS 500 mL (has no administration in time range)   sodium chloride 0.9 % infusion (has no administration in time range)   piperacillin-tazobactam (ZOSYN) 3.375 g vial to attach to  mL bag (has no administration in time range)       Assessments & Plan (with Medical Decision Making)     MDMColette Maharaj is a 89 year old male presenting on Eliquis in the long-term.  Suspect this is due to atrial fibrillation.  He has a history of a abnormal MARISA in the left leg.  He has pulses that are palpable dorsalis pedis posterior tibial but a wound that is suspicious for a vascular nature.  Unknown onset.  Will obtain CT with runoff as I was ordered originally  ordered for an MARISA but has recently had 1 in April.   Has signs of localized cellulitis and will plan for broad-spectrum antibiotics given the necrotic nature of this will least start with Zosyn and vancomycin.  Blood cultures were performed.  Pain management which is his primary complaint with IV Dilaudid and oral oxycodone and Tylenol.  Will discuss with vascular surgery as indicated after imaging is back.    The formal read on his arterial study was pending for several hours and I did speak to vascular surgery ultimately at around 2 pm, after I had not yet received a report and as I was turning the patient over to Dr. Cabrera.  I spoke with Dr. Coronel in vascular surgery at [facility] who noted that care was to be complex -revascularization would be preferred if this were consistent with goals of care, but that at 89 years old with ?dementia - would be a decision with family.  continue broad spectrum antibiotics.  symptomatic management with analgesics.  signed out to Dr. Cabrera.  On eloquis for anticoagulation.        I have reviewed the nursing notes.    I have reviewed the findings, diagnosis, plan and need for follow up with the patient.           Medical Decision Making  The patient's presentation was of high complexity (a chronic illness severe exacerbation, progression, or side effect of treatment).    The patient's evaluation involved:  ordering and/or review of 3+ test(s) in this encounter (see separate area of note for details)    The patient's management necessitated high risk (a decision regarding hospitalization).        New Prescriptions    No medications on file       Final diagnoses:   PAD (peripheral artery disease)   Cellulitis of left lower extremity       7/1/2025   [facility] EMERGENCY DEPT       Tate Yen MD  07/01/25 1713

## 2025-07-01 NOTE — ED PROVIDER NOTES
Emergency Department Patient Sign-out       Brief HPI:  This is a 89 year old male signed out to me by Dr. Yen at the end of his shift at 2:06 PM.  See initial ED Provider note for details of the presentation.     Significant Events prior to my assuming care: X-ray showed possible osteomyelitis of the tuft of the left great toe, left lower extremity ultrasound evaluation showed no DVT, a CTA abdomen pelvis with runoff has been ordered with results pending.  Vascular surgeon on-call was consulted by Dr. Yen but could not speak with Dr. Yen as he was in the OR and will be calling us back.  IV Zosyn and vancomycin were initiated.  Patient history of atrial fibrillation is chronically anticoagulated on Eliquis, heparinization was deferred.    Exam:   Patient Vitals for the past 24 hrs:   BP Temp Temp src Pulse Resp SpO2 Weight   07/01/25 1524 -- -- -- -- -- (!) 91 % --   07/01/25 1509 -- -- -- -- -- (!) 91 % --   07/01/25 1454 -- -- -- -- -- 92 % --   07/01/25 1439 -- -- -- -- -- 93 % --   07/01/25 1424 -- -- -- -- -- 94 % --   07/01/25 1354 -- -- -- -- -- 93 % --   07/01/25 1324 -- -- -- -- -- (!) 86 % --   07/01/25 1316 -- -- -- 66 -- (!) 90 % --   07/01/25 1313 (!) 122/36 -- -- -- -- 93 % --   07/01/25 1305 113/72 -- -- -- -- -- --   07/01/25 1250 (!) 110/36 -- -- -- -- 94 % --   07/01/25 1235 122/41 -- -- -- -- -- --   07/01/25 1233 122/41 -- -- 65 -- 93 % --   07/01/25 1149 (!) 156/58 -- -- -- -- 96 % --   07/01/25 1111 138/42 -- -- 65 -- 95 % --   07/01/25 0958 (!) 141/60 -- -- -- -- -- --   07/01/25 0956 (!) 141/60 98.1  F (36.7  C) Oral 70 18 95 % 85.7 kg (189 lb)           ED RESULTS:   Results for orders placed or performed during the hospital encounter of 07/01/25 (from the past 24 hours)   CBC with platelets differential     Status: Abnormal    Collection Time: 07/01/25 10:06 AM    Narrative    The following orders were created for panel order CBC with platelets differential.  Procedure                                Abnormality         Status                     ---------                               -----------         ------                     CBC with platelets and ...[[id number]]  Abnormal            Final result                 Please view results for these tests on the individual orders.   Comprehensive metabolic panel     Status: Abnormal    Collection Time: 07/01/25 10:06 AM   Result Value Ref Range    Sodium 138 135 - 145 mmol/L    Potassium 4.4 3.4 - 5.3 mmol/L    Carbon Dioxide (CO2) 26 22 - 29 mmol/L    Anion Gap 11 7 - 15 mmol/L    Urea Nitrogen 33.6 (H) 8.0 - 23.0 mg/dL    Creatinine 1.20 (H) 0.67 - 1.17 mg/dL    GFR Estimate 57 (L) >60 mL/min/1.73m2    Calcium 8.9 8.8 - 10.4 mg/dL    Chloride 101 98 - 107 mmol/L    Glucose 110 (H) 70 - 99 mg/dL    Alkaline Phosphatase 79 40 - 150 U/L    AST 36 0 - 45 U/L    ALT 14 0 - 70 U/L    Protein Total 6.9 6.4 - 8.3 g/dL    Albumin 3.3 (L) 3.5 - 5.2 g/dL    Bilirubin Total 0.4 <=1.2 mg/dL   INR     Status: Abnormal    Collection Time: 07/01/25 10:06 AM   Result Value Ref Range    INR 1.60 (H) 0.85 - 1.15    PT 18.7 (H) 11.8 - 14.8 Seconds   PTT     Status: Normal    Collection Time: 07/01/25 10:06 AM   Result Value Ref Range    aPTT 34 22 - 38 Seconds   Lactic Acid Whole Blood with 1X Repeat in 2 HR when >2     Status: Normal    Collection Time: 07/01/25 10:06 AM   Result Value Ref Range    Lactic Acid, Initial 1.1 0.7 - 2.0 mmol/L   CBC with platelets and differential     Status: Abnormal    Collection Time: 07/01/25 10:06 AM   Result Value Ref Range    WBC Count 8.0 4.0 - 11.0 10e3/uL    RBC Count 4.04 (L) 4.40 - 5.90 10e6/uL    Hemoglobin 10.9 (L) 13.3 - 17.7 g/dL    Hematocrit 34.1 (L) 40.0 - 53.0 %    MCV 84 78 - 100 fL    MCH 27.0 26.5 - 33.0 pg    MCHC 32.0 31.5 - 36.5 g/dL    RDW 14.9 10.0 - 15.0 %    Platelet Count 233 150 - 450 10e3/uL    % Neutrophils 69 %    % Lymphocytes 20 %    % Monocytes 8 %    % Eosinophils 2 %    % Basophils 0 %    %  Immature Granulocytes 0 %    NRBCs per 100 WBC 0 <1 /100    Absolute Neutrophils 5.5 1.6 - 8.3 10e3/uL    Absolute Lymphocytes 1.6 0.8 - 5.3 10e3/uL    Absolute Monocytes 0.6 0.0 - 1.3 10e3/uL    Absolute Eosinophils 0.2 0.0 - 0.7 10e3/uL    Absolute Basophils 0.0 0.0 - 0.2 10e3/uL    Absolute Immature Granulocytes 0.0 <=0.4 10e3/uL    Absolute NRBCs 0.0 10e3/uL   EKG 12 lead     Status: None (Preliminary result)    Collection Time: 07/01/25 10:14 AM   Result Value Ref Range    Systolic Blood Pressure  mmHg    Diastolic Blood Pressure  mmHg    Ventricular Rate 67 BPM    Atrial Rate  BPM    TX Interval  ms    QRS Duration 128 ms     ms    QTc 464 ms    P Axis  degrees    R AXIS -54 degrees    T Axis -12 degrees    Interpretation ECG       Wide QRS rhythm  Left axis deviation  Right bundle branch block  Inferior infarct , age undetermined  Abnormal ECG  When compared with ECG of 02-Jun-2025 06:40,  No significant change was found     US Lower Extremity Venous Duplex Left     Status: None    Collection Time: 07/01/25 11:24 AM    Narrative    EXAM: US LOWER EXTREMITY VENOUS DUPLEX LEFT  LOCATION: [facility]  DATE: 7/1/2025    INDICATION: edema, necrotic leg wound.  edema  COMPARISON: None.  TECHNIQUE: Venous Duplex ultrasound of the left lower extremity with and without compression, augmentation and duplex. Color flow and spectral Doppler with waveform analysis performed.    FINDINGS: Exam includes the common femoral, femoral, popliteal, and contralateral common femoral veins as well as segmentally visualized deep calf veins and greater saphenous vein.     LEFT: No deep vein thrombosis. No superficial thrombophlebitis. No popliteal cyst.      Impression    IMPRESSION:  1.  No deep venous thrombosis in the left lower extremity.   CTA Abdomen Pelvis Runoff w Contrast     Status: None    Collection Time: 07/01/25 12:05 PM    Narrative    EXAM: CTA ABDOMEN PELVIS RUNOFF W  CONTRAST  LOCATION: [facility]  DATE: 7/1/2025    INDICATION: suspect critical limb ischemia and necrotic foot wound left leg.  s p abnormal MARISA in april  COMPARISON: CT of the abdomen and pelvis with lower extremity runoff dated 10/11/2023  TECHNIQUE: Helical acquisition through the abdomen, pelvis, and bilateral lower extremities was performed during the arterial phase of contrast enhancement using IV Contrast. 2D and 3D reconstructions were performed by the CT technologist. Dose reduction   techniques were used.   CONTRAST: 100 cc    FINDINGS:  AORTA: Again identified are changes consistent with endovascular repair of an infrarenal abdominal aortic aneurysm utilizing a bifurcated endograft. Again noted is an occlusion of the left limb which terminates in the distal abdominal aorta. The right   limb is patent without significant stenosis.    Again noted is an occlusion of the celiac trunk. Superior mesenteric artery is patent without significant stenosis. The origin of the inferior mesenteric artery is occluded. The renal arteries are patent.    Iliac arteries: The right common iliac artery distal to the right limb of the endograft and the right external iliac artery are patent without significant stenoses. The right internal iliac artery is occluded.    The left common, external, and internal iliac arteries are occluded.    RIGHT LEG:   Common femoral artery: Calcified plaque. Severe stenosis just proximal to a right femoral to below-knee popliteal artery bypass graft.  Profunda femoris artery: No significant stenosis.  Superficial femoral artery: Long segment occlusion.  Popliteal artery: Occluded above the knee. Patent below the knee without significant stenosis.  Tibial arteries: Dominant runoff artery is the peroneal artery. There are multiple significant stenoses in the posterior tibial artery. There are multiple occlusions in the anterior tibial artery.    LEFT LEG:   Common  femoral artery: Calcified plaque. No significant stenosis.  Profunda femoris artery: No definite significant stenosis.  Superficial femoral artery: Long segment occlusion  Right femoral to below-knee popliteal artery bypass graft: Partially obscured due to motion artifact. There appear to be significant stenoses within the graft at its mid distal aspects.  Popliteal artery: Poorly opacified and therefore cannot be adequately evaluated.  Tibial arteries: Primarily single vessel runoff via the peroneal artery.    LUNG BASES: No acute infiltrates.    ABDOMEN: Small cysts in the left lobe of liver. Again noted are bilateral adrenal nodules. These are unchanged in size. There are scattered colonic diverticuli. No evidence for acute diverticulitis. The prostate is enlarged in size. There are multilevel   degenerative changes in the lumbar spine.       Impression    IMPRESSION:  1.  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 Occluded left common, external, and internal iliac arteries.  2.  Occluded celiac trunk.  3.  Long segment occlusion of the right superficial femoral artery. Occluded above-knee right popliteal artery. Patent right femoral to below-knee popliteal artery bypass graft. Significant stenosis in the common femoral artery just proximal to the   proximal anastomosis of this graft. This is not significantly changed when compared the prior exam.  4.  Long segment occlusion of the left superficial femoral artery. Partially obscured left femoral to below-knee popliteal artery bypass graft. There appear to be significant stenoses within the mid distal portions of the graft.  5.  Dominant runoff via the peroneal arteries bilaterally.   XR Foot Port Left 3 Views     Status: None    Collection Time: 07/01/25  1:13 PM    Narrative    EXAM: XR FOOT PORT LEFT 3 VIEWS  LOCATION: [facility]  DATE: 7/1/2025    INDICATION: Left foot necrotic wound.  COMPARISON: None.      Impression     IMPRESSION:   Soft tissue swelling and bones are demineralized. There is some cortical irregularity along the distal tuft of the distal phalanx great toe, equivocal for osteomyelitis. If there is ulceration in this area and further evaluation is needed, recommend MRI.    Hammertoe configurations. No evidence of an acute displaced fracture. Atherosclerotic vascular calcifications.    NOTE: ABNORMAL REPORT    THE DICTATION ABOVE DESCRIBES AN ABNORMALITY FOR WHICH FOLLOW-UP IS NEEDED.         ED MEDICATIONS:   Medications   sodium chloride 0.9 % infusion (1,000 mLs Intravenous $New Bag 7/1/25 1147)   piperacillin-tazobactam (ZOSYN) 3.375 g vial to attach to  mL bag (0 g Intravenous Stopped 7/1/25 1307)   HYDROmorphone (PF) (DILAUDID) injection 0.3 mg (0.3 mg Intravenous $Given 7/1/25 1412)   vancomycin (VANCOCIN) 1,000 mg in 200 mL dextrose intermittent infusion (has no administration in time range)   sodium chloride 0.9% BOLUS 500 mL (0 mLs Intravenous Stopped 7/1/25 1147)   oxyCODONE (ROXICODONE) tablet 5 mg (5 mg Oral $Given 7/1/25 1014)   acetaminophen (TYLENOL) tablet 650 mg (650 mg Oral $Given 7/1/25 1014)   iopamidol (ISOVUE-370) solution 100 mL (100 mLs Intravenous $Given 7/1/25 1122)   sodium chloride 0.9 % bag for CT scan flush (100 mLs As instructed $Given 7/1/25 1122)   sodium chloride 0.9% BOLUS 500 mL (500 mLs Intravenous $New Bag 7/1/25 1413)         Impression:    ICD-10-CM    1. PAD (peripheral artery disease)  I73.9       2. Cellulitis of left lower extremity  L03.116       3. Gangrene of toe of left foot (H)  I96     Distal left great toe          Plan:    Pending studies: CTA abdomen pelvis runoff with IV contrast.      Awaiting vascular surgery consult regarding potential treatment options, then arrange for transfer to a facility with vascular surgery services.    2:28 PM -prior to leaving after the end of his shift, Dr. Yen consulted at the vascular surgeon on-call.  Dr. Yen reports that  vascular surgeon recommended admission to facility with vascular services available for consultation and to address patient and family's wishes regarding management, including revascularization.    3:03 PM -no beds were available for admission to the [facility], [facility].  I reviewed the case and consulted with Dr. Cottrell, Triage Hospitalist for [facility].  They will accept care of the patient transfer there when a bed becomes available in the near future    3:41 PM -a bed has become available for the patient's transfer for admission to [facility], I completed the transfer paperwork for transfer.       Ilia Cabrera MD  07/01/25 3622

## 2025-07-01 NOTE — ED NOTES
Writer updated patient's niece, Donita ([email or web address]) on patient's status.  Patient also spoke with Donita.

## 2025-07-01 NOTE — ED TRIAGE NOTES
[place] [facility] [place] great toe and drainage with redness to second toe on same foot     Triage Assessment (Adult)       Row Name 07/01/25 1000          Triage Assessment    Airway WDL WDL        Respiratory WDL    Respiratory WDL WDL        Skin Circulation/Temperature WDL    Skin Circulation/Temperature WDL X  compromised skin integrity on left foot and especially great toe and second on that foot        Cardiac WDL    Cardiac WDL WDL        Peripheral/Neurovascular WDL    Peripheral Neurovascular WDL WDL        Cognitive/Neuro/Behavioral WDL    Cognitive/Neuro/Behavioral WDL WDL

## 2025-07-01 NOTE — PROGRESS NOTES
Transfer Type: [facility]  Transfer Triage Note    Date of call: 07/01/25  Time of call: 2:59 PM    Current Patient Location:  [facility] ED  Current Level of Care: ED    Vitals: Temp: 98.1  F (36.7  C) Temp src: Oral BP: (!) 122/36 Pulse: 66   Resp: 18 SpO2: (!) 90 %   Weight: 85.7 kg (189 lb)  O2 Device: None (Room air) at    Diagnosis: Left great toe infection, possible gangrene  Reason for requested transfer: Further diagnostic work up, management, and consultation for specialized care   Isolation Needs: None    Care everywhere has been updated and reviewed: Yes  Necessary images have been sent through PACS: Yes    If patient is transferring for specialty care or specific procedure, the specialist required has participated in the transfer call and agreed with need for transfer and anticipated timeline: No    Transfer accepted: Yes  Stability of Patient: Patient is vitally stable, with no critical labs, and will likely remain stable throughout the transfer process  Does the patient require placement on the [facility]? No.  Level of Care Needed: Med Surg  Telemetry Needed:  None  Expected Time of Arrival for Transfer: 0-8 hours  Arrival Location:  [facility]     Recommendations for Management and Stabilization: Not needed    Additional Comments:   89 year old male with significant vascular disease including aortic aneurysm s/p repair, popliteal bypass, on chronic anticoagulation with eliquis [place] [facility] [place] gang[place]. Admission for IV antibiotics and vascular surgery evaluation (called in the [facility] ED)    To notify the admitting provider that the patient has arrived at their destination:    For [facility]: Identify the correct provider on [employer]: [facility] [place] and then find the provider who has the title CAPTAIN: DIRECT ADMITS or CAPTAIN: ER / DIRECT ADMIT / PT PLACEMENT next to their name. Use  Chandra to contact that person.      Antonio Cottrell MD

## 2025-07-02 ENCOUNTER — ANESTHESIA (OUTPATIENT)
Dept: SURGERY | Facility: CLINIC | Age: OVER 89
End: 2025-07-02
Payer: COMMERCIAL

## 2025-07-02 ENCOUNTER — ANESTHESIA EVENT (OUTPATIENT)
Dept: SURGERY | Facility: CLINIC | Age: OVER 89
End: 2025-07-02
Payer: COMMERCIAL

## 2025-07-02 LAB
ABO + RH BLD: NORMAL
ANION GAP SERPL CALCULATED.3IONS-SCNC: 10 MMOL/L (ref 7–15)
ATRIAL RATE - MUSE: 75 BPM
BLD GP AB SCN SERPL QL: NEGATIVE
BUN SERPL-MCNC: 25.4 MG/DL (ref 8–23)
CALCIUM SERPL-MCNC: 9 MG/DL (ref 8.8–10.4)
CHLORIDE SERPL-SCNC: 107 MMOL/L (ref 98–107)
CREAT SERPL-MCNC: 1.03 MG/DL (ref 0.67–1.17)
DIASTOLIC BLOOD PRESSURE - MUSE: NORMAL MMHG
EGFRCR SERPLBLD CKD-EPI 2021: 68 ML/MIN/1.73M2
ERYTHROCYTE [DISTWIDTH] IN BLOOD BY AUTOMATED COUNT: 15.1 % (ref 10–15)
GLUCOSE SERPL-MCNC: 104 MG/DL (ref 70–99)
HCO3 SERPL-SCNC: 24 MMOL/L (ref 22–29)
HCT VFR BLD AUTO: 32.3 % (ref 40–53)
HGB BLD-MCNC: 10.1 G/DL (ref 13.3–17.7)
INTERPRETATION ECG - MUSE: NORMAL
MCH RBC QN AUTO: 26.3 PG (ref 26.5–33)
MCHC RBC AUTO-ENTMCNC: 31.3 G/DL (ref 31.5–36.5)
MCV RBC AUTO: 84 FL (ref 78–100)
MRSA DNA SPEC QL NAA+PROBE: NEGATIVE
P AXIS - MUSE: 93 DEGREES
PLATELET # BLD AUTO: 215 10E3/UL (ref 150–450)
POTASSIUM SERPL-SCNC: 4.3 MMOL/L (ref 3.4–5.3)
PR INTERVAL - MUSE: 218 MS
QRS DURATION - MUSE: 130 MS
QT - MUSE: 414 MS
QTC - MUSE: 462 MS
R AXIS - MUSE: -42 DEGREES
RBC # BLD AUTO: 3.84 10E6/UL (ref 4.4–5.9)
SA TARGET DNA: POSITIVE
SODIUM SERPL-SCNC: 141 MMOL/L (ref 135–145)
SPECIMEN EXP DATE BLD: NORMAL
SYSTOLIC BLOOD PRESSURE - MUSE: NORMAL MMHG
T AXIS - MUSE: 5 DEGREES
VENTRICULAR RATE- MUSE: 75 BPM
WBC # BLD AUTO: 7.2 10E3/UL (ref 4–11)

## 2025-07-02 PROCEDURE — 99222 1ST HOSP IP/OBS MODERATE 55: CPT | Performed by: INTERNAL MEDICINE

## 2025-07-02 PROCEDURE — 360N000077 HC SURGERY LEVEL 4, PER MIN: Performed by: SURGERY

## 2025-07-02 PROCEDURE — 250N000011 HC RX IP 250 OP 636: Performed by: ANESTHESIOLOGY

## 2025-07-02 PROCEDURE — 272N000001 HC OR GENERAL SUPPLY STERILE: Performed by: SURGERY

## 2025-07-02 PROCEDURE — 99232 SBSQ HOSP IP/OBS MODERATE 35: CPT | Performed by: STUDENT IN AN ORGANIZED HEALTH CARE EDUCATION/TRAINING PROGRAM

## 2025-07-02 PROCEDURE — 370N000017 HC ANESTHESIA TECHNICAL FEE, PER MIN: Performed by: SURGERY

## 2025-07-02 PROCEDURE — 35661 BPG FEMORAL-FEMORAL: CPT | Mod: 22 | Performed by: SURGERY

## 2025-07-02 PROCEDURE — 87641 MR-STAPH DNA AMP PROBE: CPT | Performed by: INTERNAL MEDICINE

## 2025-07-02 PROCEDURE — 86901 BLOOD TYPING SEROLOGIC RH(D): CPT | Performed by: ANESTHESIOLOGY

## 2025-07-02 PROCEDURE — 258N000003 HC RX IP 258 OP 636: Performed by: SURGERY

## 2025-07-02 PROCEDURE — 250N000013 HC RX MED GY IP 250 OP 250 PS 637: Performed by: STUDENT IN AN ORGANIZED HEALTH CARE EDUCATION/TRAINING PROGRAM

## 2025-07-02 PROCEDURE — 85027 COMPLETE CBC AUTOMATED: CPT | Performed by: INTERNAL MEDICINE

## 2025-07-02 PROCEDURE — 041K0KJ BYPASS RIGHT FEMORAL ARTERY TO LEFT FEMORAL ARTERY WITH NONAUTOLOGOUS TISSUE SUBSTITUTE, OPEN APPROACH: ICD-10-PCS | Performed by: SURGERY

## 2025-07-02 PROCEDURE — 250N000009 HC RX 250: Performed by: SURGERY

## 2025-07-02 PROCEDURE — 258N000003 HC RX IP 258 OP 636: Performed by: INTERNAL MEDICINE

## 2025-07-02 PROCEDURE — 93005 ELECTROCARDIOGRAM TRACING: CPT

## 2025-07-02 PROCEDURE — 999N000141 HC STATISTIC PRE-PROCEDURE NURSING ASSESSMENT: Performed by: SURGERY

## 2025-07-02 PROCEDURE — 250N000025 HC SEVOFLURANE, PER MIN: Performed by: SURGERY

## 2025-07-02 PROCEDURE — 36415 COLL VENOUS BLD VENIPUNCTURE: CPT | Performed by: INTERNAL MEDICINE

## 2025-07-02 PROCEDURE — 250N000011 HC RX IP 250 OP 636: Performed by: INTERNAL MEDICINE

## 2025-07-02 PROCEDURE — 250N000011 HC RX IP 250 OP 636: Performed by: NURSE ANESTHETIST, CERTIFIED REGISTERED

## 2025-07-02 PROCEDURE — 120N000001 HC R&B MED SURG/OB

## 2025-07-02 PROCEDURE — 99222 1ST HOSP IP/OBS MODERATE 55: CPT | Performed by: PODIATRIST

## 2025-07-02 PROCEDURE — C1768 GRAFT, VASCULAR: HCPCS | Performed by: SURGERY

## 2025-07-02 PROCEDURE — 250N000009 HC RX 250: Performed by: NURSE ANESTHETIST, CERTIFIED REGISTERED

## 2025-07-02 PROCEDURE — 99222 1ST HOSP IP/OBS MODERATE 55: CPT | Mod: 57 | Performed by: SURGERY

## 2025-07-02 PROCEDURE — 250N000011 HC RX IP 250 OP 636: Performed by: SURGERY

## 2025-07-02 PROCEDURE — 258N000003 HC RX IP 258 OP 636: Performed by: NURSE ANESTHETIST, CERTIFIED REGISTERED

## 2025-07-02 PROCEDURE — 80048 BASIC METABOLIC PNL TOTAL CA: CPT | Performed by: INTERNAL MEDICINE

## 2025-07-02 PROCEDURE — 710N000009 HC RECOVERY PHASE 1, LEVEL 1, PER MIN: Performed by: SURGERY

## 2025-07-02 PROCEDURE — 250N000013 HC RX MED GY IP 250 OP 250 PS 637: Performed by: INTERNAL MEDICINE

## 2025-07-02 PROCEDURE — 86900 BLOOD TYPING SEROLOGIC ABO: CPT | Performed by: ANESTHESIOLOGY

## 2025-07-02 DEVICE — 6MM X 30CM
Type: IMPLANTABLE DEVICE | Site: ILIAC/FEMORALS | Status: FUNCTIONAL
Brand: ARTEGRAFT VASCULAR GRAFT

## 2025-07-02 RX ORDER — PROPOFOL 10 MG/ML
INJECTION, EMULSION INTRAVENOUS PRN
Status: DISCONTINUED | OUTPATIENT
Start: 2025-07-02 | End: 2025-07-02

## 2025-07-02 RX ORDER — NALOXONE HYDROCHLORIDE 0.4 MG/ML
0.1 INJECTION, SOLUTION INTRAMUSCULAR; INTRAVENOUS; SUBCUTANEOUS
Status: DISCONTINUED | OUTPATIENT
Start: 2025-07-02 | End: 2025-07-02 | Stop reason: HOSPADM

## 2025-07-02 RX ORDER — LIDOCAINE 40 MG/G
CREAM TOPICAL
Status: DISCONTINUED | OUTPATIENT
Start: 2025-07-02 | End: 2025-07-02

## 2025-07-02 RX ORDER — HYDROMORPHONE HCL IN WATER/PF 6 MG/30 ML
0.2 PATIENT CONTROLLED ANALGESIA SYRINGE INTRAVENOUS
Status: ACTIVE | OUTPATIENT
Start: 2025-07-02

## 2025-07-02 RX ORDER — ONDANSETRON 2 MG/ML
4 INJECTION INTRAMUSCULAR; INTRAVENOUS EVERY 30 MIN PRN
Status: DISCONTINUED | OUTPATIENT
Start: 2025-07-02 | End: 2025-07-02 | Stop reason: HOSPADM

## 2025-07-02 RX ORDER — HEPARIN SODIUM 1000 [USP'U]/ML
INJECTION, SOLUTION INTRAVENOUS; SUBCUTANEOUS PRN
Status: DISCONTINUED | OUTPATIENT
Start: 2025-07-02 | End: 2025-07-02 | Stop reason: HOSPADM

## 2025-07-02 RX ORDER — POLYETHYLENE GLYCOL 3350 17 G/17G
17 POWDER, FOR SOLUTION ORAL DAILY
Status: DISPENSED | OUTPATIENT
Start: 2025-07-03

## 2025-07-02 RX ORDER — BUPIVACAINE HYDROCHLORIDE 5 MG/ML
INJECTION, SOLUTION PERINEURAL PRN
Status: DISCONTINUED | OUTPATIENT
Start: 2025-07-02 | End: 2025-07-02 | Stop reason: HOSPADM

## 2025-07-02 RX ORDER — HYDRALAZINE HYDROCHLORIDE 20 MG/ML
2.5-5 INJECTION INTRAMUSCULAR; INTRAVENOUS EVERY 10 MIN PRN
Status: DISCONTINUED | OUTPATIENT
Start: 2025-07-02 | End: 2025-07-02 | Stop reason: HOSPADM

## 2025-07-02 RX ORDER — PROPOFOL 10 MG/ML
INJECTION, EMULSION INTRAVENOUS CONTINUOUS PRN
Status: DISCONTINUED | OUTPATIENT
Start: 2025-07-02 | End: 2025-07-02

## 2025-07-02 RX ORDER — CEFAZOLIN SODIUM 2 G/50ML
2 SOLUTION INTRAVENOUS EVERY 8 HOURS
Status: DISCONTINUED | OUTPATIENT
Start: 2025-07-02 | End: 2025-07-02

## 2025-07-02 RX ORDER — HEPARIN SODIUM 1000 [USP'U]/ML
INJECTION, SOLUTION INTRAVENOUS; SUBCUTANEOUS PRN
Status: DISCONTINUED | OUTPATIENT
Start: 2025-07-02 | End: 2025-07-02

## 2025-07-02 RX ORDER — ENOXAPARIN SODIUM 100 MG/ML
40 INJECTION SUBCUTANEOUS EVERY 24 HOURS
Status: DISPENSED | OUTPATIENT
Start: 2025-07-03

## 2025-07-02 RX ORDER — ASPIRIN 81 MG/1
81 TABLET ORAL DAILY
Status: DISCONTINUED | OUTPATIENT
Start: 2025-07-03 | End: 2025-07-02

## 2025-07-02 RX ORDER — LABETALOL HYDROCHLORIDE 5 MG/ML
10 INJECTION, SOLUTION INTRAVENOUS
Status: DISCONTINUED | OUTPATIENT
Start: 2025-07-02 | End: 2025-07-02 | Stop reason: HOSPADM

## 2025-07-02 RX ORDER — HYDROMORPHONE HCL IN WATER/PF 6 MG/30 ML
0.2 PATIENT CONTROLLED ANALGESIA SYRINGE INTRAVENOUS EVERY 5 MIN PRN
Status: DISCONTINUED | OUTPATIENT
Start: 2025-07-02 | End: 2025-07-02 | Stop reason: HOSPADM

## 2025-07-02 RX ORDER — OXYCODONE HYDROCHLORIDE 5 MG/1
10 TABLET ORAL EVERY 4 HOURS PRN
Status: DISPENSED | OUTPATIENT
Start: 2025-07-02

## 2025-07-02 RX ORDER — LIDOCAINE HYDROCHLORIDE 20 MG/ML
INJECTION, SOLUTION INFILTRATION; PERINEURAL PRN
Status: DISCONTINUED | OUTPATIENT
Start: 2025-07-02 | End: 2025-07-02

## 2025-07-02 RX ORDER — CEFAZOLIN SODIUM/WATER 2 G/20 ML
SYRINGE (ML) INTRAVENOUS PRN
Status: DISCONTINUED | OUTPATIENT
Start: 2025-07-02 | End: 2025-07-02

## 2025-07-02 RX ORDER — MAGNESIUM HYDROXIDE 1200 MG/15ML
LIQUID ORAL PRN
Status: DISCONTINUED | OUTPATIENT
Start: 2025-07-02 | End: 2025-07-02 | Stop reason: HOSPADM

## 2025-07-02 RX ORDER — FENTANYL CITRATE 50 UG/ML
INJECTION, SOLUTION INTRAMUSCULAR; INTRAVENOUS PRN
Status: DISCONTINUED | OUTPATIENT
Start: 2025-07-02 | End: 2025-07-02

## 2025-07-02 RX ORDER — DEXAMETHASONE SODIUM PHOSPHATE 4 MG/ML
INJECTION, SOLUTION INTRA-ARTICULAR; INTRALESIONAL; INTRAMUSCULAR; INTRAVENOUS; SOFT TISSUE PRN
Status: DISCONTINUED | OUTPATIENT
Start: 2025-07-02 | End: 2025-07-02

## 2025-07-02 RX ORDER — ACETAMINOPHEN 325 MG/1
975 TABLET ORAL 3 TIMES DAILY
Status: DISPENSED | OUTPATIENT
Start: 2025-07-02

## 2025-07-02 RX ORDER — ONDANSETRON 4 MG/1
4 TABLET, ORALLY DISINTEGRATING ORAL EVERY 30 MIN PRN
Status: DISCONTINUED | OUTPATIENT
Start: 2025-07-02 | End: 2025-07-02 | Stop reason: HOSPADM

## 2025-07-02 RX ORDER — ONDANSETRON 2 MG/ML
INJECTION INTRAMUSCULAR; INTRAVENOUS PRN
Status: DISCONTINUED | OUTPATIENT
Start: 2025-07-02 | End: 2025-07-02

## 2025-07-02 RX ORDER — AMOXICILLIN 250 MG
1 CAPSULE ORAL 2 TIMES DAILY
Status: DISPENSED | OUTPATIENT
Start: 2025-07-02

## 2025-07-02 RX ORDER — DEXAMETHASONE SODIUM PHOSPHATE 4 MG/ML
4 INJECTION, SOLUTION INTRA-ARTICULAR; INTRALESIONAL; INTRAMUSCULAR; INTRAVENOUS; SOFT TISSUE
Status: DISCONTINUED | OUTPATIENT
Start: 2025-07-02 | End: 2025-07-02 | Stop reason: HOSPADM

## 2025-07-02 RX ORDER — HYDROMORPHONE HCL IN WATER/PF 6 MG/30 ML
0.4 PATIENT CONTROLLED ANALGESIA SYRINGE INTRAVENOUS
Status: ACTIVE | OUTPATIENT
Start: 2025-07-02

## 2025-07-02 RX ORDER — SODIUM CHLORIDE, SODIUM LACTATE, POTASSIUM CHLORIDE, CALCIUM CHLORIDE 600; 310; 30; 20 MG/100ML; MG/100ML; MG/100ML; MG/100ML
INJECTION, SOLUTION INTRAVENOUS CONTINUOUS
Status: DISCONTINUED | OUTPATIENT
Start: 2025-07-02 | End: 2025-07-02 | Stop reason: HOSPADM

## 2025-07-02 RX ORDER — OXYCODONE HYDROCHLORIDE 5 MG/1
5 TABLET ORAL EVERY 4 HOURS PRN
Status: ACTIVE | OUTPATIENT
Start: 2025-07-02

## 2025-07-02 RX ORDER — FENTANYL CITRATE 50 UG/ML
25 INJECTION, SOLUTION INTRAMUSCULAR; INTRAVENOUS EVERY 5 MIN PRN
Status: DISCONTINUED | OUTPATIENT
Start: 2025-07-02 | End: 2025-07-02 | Stop reason: HOSPADM

## 2025-07-02 RX ORDER — BISACODYL 10 MG
10 SUPPOSITORY, RECTAL RECTAL DAILY PRN
Status: ACTIVE | OUTPATIENT
Start: 2025-07-05

## 2025-07-02 RX ADMIN — ASPIRIN 81 MG: 81 TABLET, DELAYED RELEASE ORAL at 08:00

## 2025-07-02 RX ADMIN — PHENYLEPHRINE HYDROCHLORIDE 0.3 MCG/KG/MIN: 10 INJECTION INTRAVENOUS at 16:38

## 2025-07-02 RX ADMIN — LIDOCAINE HYDROCHLORIDE 50 MG: 20 INJECTION, SOLUTION INFILTRATION; PERINEURAL at 16:14

## 2025-07-02 RX ADMIN — PROPOFOL 110 MG: 10 INJECTION, EMULSION INTRAVENOUS at 16:14

## 2025-07-02 RX ADMIN — PROPOFOL 30 MG: 10 INJECTION, EMULSION INTRAVENOUS at 18:59

## 2025-07-02 RX ADMIN — METRONIDAZOLE 500 MG: 500 INJECTION, SOLUTION INTRAVENOUS at 15:19

## 2025-07-02 RX ADMIN — METOPROLOL TARTRATE 25 MG: 25 TABLET, FILM COATED ORAL at 08:00

## 2025-07-02 RX ADMIN — ROCURONIUM BROMIDE 10 MG: 50 INJECTION, SOLUTION INTRAVENOUS at 17:57

## 2025-07-02 RX ADMIN — METOPROLOL TARTRATE 25 MG: 25 TABLET, FILM COATED ORAL at 22:13

## 2025-07-02 RX ADMIN — SODIUM CHLORIDE, SODIUM LACTATE, POTASSIUM CHLORIDE, AND CALCIUM CHLORIDE: .6; .31; .03; .02 INJECTION, SOLUTION INTRAVENOUS at 14:02

## 2025-07-02 RX ADMIN — DEXMEDETOMIDINE HYDROCHLORIDE 8 MCG: 100 INJECTION, SOLUTION INTRAVENOUS at 18:59

## 2025-07-02 RX ADMIN — FENTANYL CITRATE 25 MCG: 50 INJECTION INTRAMUSCULAR; INTRAVENOUS at 18:46

## 2025-07-02 RX ADMIN — DOCUSATE SODIUM 50 MG AND SENNOSIDES 8.6 MG 1 TABLET: 8.6; 5 TABLET, FILM COATED ORAL at 22:14

## 2025-07-02 RX ADMIN — FENTANYL CITRATE 25 MCG: 50 INJECTION INTRAMUSCULAR; INTRAVENOUS at 16:14

## 2025-07-02 RX ADMIN — ROCURONIUM BROMIDE 10 MG: 50 INJECTION, SOLUTION INTRAVENOUS at 17:21

## 2025-07-02 RX ADMIN — Medication 200 MG: at 19:22

## 2025-07-02 RX ADMIN — CEFEPIME 2 G: 2 INJECTION, POWDER, FOR SOLUTION INTRAVENOUS at 22:27

## 2025-07-02 RX ADMIN — ACETAMINOPHEN 975 MG: 325 TABLET ORAL at 22:13

## 2025-07-02 RX ADMIN — ATORVASTATIN CALCIUM 40 MG: 40 TABLET, FILM COATED ORAL at 22:13

## 2025-07-02 RX ADMIN — ROCURONIUM BROMIDE 35 MG: 50 INJECTION, SOLUTION INTRAVENOUS at 16:14

## 2025-07-02 RX ADMIN — PROPOFOL 50 MCG/KG/MIN: 10 INJECTION, EMULSION INTRAVENOUS at 16:25

## 2025-07-02 RX ADMIN — CEFEPIME 2 G: 2 INJECTION, POWDER, FOR SOLUTION INTRAVENOUS at 11:15

## 2025-07-02 RX ADMIN — METRONIDAZOLE 500 MG: 500 INJECTION, SOLUTION INTRAVENOUS at 00:33

## 2025-07-02 RX ADMIN — DEXAMETHASONE SODIUM PHOSPHATE 4 MG: 4 INJECTION, SOLUTION INTRA-ARTICULAR; INTRALESIONAL; INTRAMUSCULAR; INTRAVENOUS; SOFT TISSUE at 16:35

## 2025-07-02 RX ADMIN — ROCURONIUM BROMIDE 15 MG: 50 INJECTION, SOLUTION INTRAVENOUS at 16:38

## 2025-07-02 RX ADMIN — ONDANSETRON 4 MG: 2 INJECTION INTRAMUSCULAR; INTRAVENOUS at 18:55

## 2025-07-02 RX ADMIN — ACETAMINOPHEN 650 MG: 325 TABLET ORAL at 02:00

## 2025-07-02 RX ADMIN — HEPARIN SODIUM 3000 UNITS: 1000 INJECTION, SOLUTION INTRAVENOUS; SUBCUTANEOUS at 17:18

## 2025-07-02 RX ADMIN — ONDANSETRON 4 MG: 2 INJECTION, SOLUTION INTRAMUSCULAR; INTRAVENOUS at 20:02

## 2025-07-02 RX ADMIN — METRONIDAZOLE 500 MG: 500 INJECTION, SOLUTION INTRAVENOUS at 07:56

## 2025-07-02 RX ADMIN — DOXAZOSIN 8 MG: 4 TABLET ORAL at 22:13

## 2025-07-02 RX ADMIN — FENTANYL CITRATE 25 MCG: 50 INJECTION INTRAMUSCULAR; INTRAVENOUS at 17:51

## 2025-07-02 RX ADMIN — Medication 2 G: at 16:13

## 2025-07-02 RX ADMIN — FENTANYL CITRATE 25 MCG: 50 INJECTION INTRAMUSCULAR; INTRAVENOUS at 16:37

## 2025-07-02 RX ADMIN — DEXMEDETOMIDINE HYDROCHLORIDE 4 MCG: 100 INJECTION, SOLUTION INTRAVENOUS at 19:15

## 2025-07-02 ASSESSMENT — ACTIVITIES OF DAILY LIVING (ADL)
DEPENDENT_IADLS:: TRANSPORTATION
ADLS_ACUITY_SCORE: 56
ADLS_ACUITY_SCORE: 54
ADLS_ACUITY_SCORE: 56

## 2025-07-02 ASSESSMENT — LIFESTYLE VARIABLES: TOBACCO_USE: 0

## 2025-07-02 ASSESSMENT — ENCOUNTER SYMPTOMS
SEIZURES: 0
DYSRHYTHMIAS: 0

## 2025-07-02 NOTE — CONSULTS
[facility]    Infectious Disease Consultation     Date of Admission:  7/1/2025  Date of Consult (When I saw the patient): 07/02/25    Assessment & Plan   Angelica Maharaj is a 89 year old male who was admitted on 7/1/2025.     Impression: 1 89-year-old male with known peripheral vascular disease, prior revascularization now with worsening ischemia to his foot and dry gangrene, possible underlying osteomyelitis relatively minor cellulitis and no systemic toxicity  2 peripheral vascular disease including probable active occlusion  3 mild chronic kidney disease  4 chronic cognitive impairment    REC 1 unlikely Vanco needed MRSA nares negative DC Vanco, getting cefepime and Flagyl but likely key here is revascularization possibly nonviable foot areas with debridement etc., amount and type of antibiotics to be determined but probably not the key to this problem        Regan Sutton MD    Reason for Consult   Reason for consult: I was asked to evaluate this patient for right foot infection.    Primary Care Physician   [facility]    Chief Complaint   Right foot pain    History is obtained from the patient and medical records    History of Present Illness   Angelica Maharaj is a 89 year old male who presents with history of prior peripheral vascular disease including [facility]  Currently with worsening foot pain and discomfort and some gangrenous changes.  No major systemic toxicity, white count normal no major fever.  Vascular surgery evaluating for intervention as is podiatry.    Past Medical History   I have reviewed this patient's medical history and updated it with pertinent information if needed.   Past Medical History:   Diagnosis Date    BPH (benign prostatic hyperplasia)     Coronary artery disease     s/p CABG    Iron deficiency anemia     Peripheral artery disease     S/p bilateral femoral to below-knee popliteal artery bypass graft        Past Surgical History   I have reviewed this patient's surgical history and updated it with pertinent information if needed.  No past surgical history on file.    Prior to Admission Medications   Prior to Admission Medications   Prescriptions Last Dose Informant Patient Reported? Taking?   HYDROcodone-acetaminophen (NORCO) 7.5-325 MG per tablet  Self Yes Yes   Sig: Take 1 tablet by mouth every 6 hours as needed for severe pain, pain or moderate to severe pain.   Vitamin D3 (VITAMIN D, CHOLECALCIFEROL,) 25 mcg (1000 units) tablet  Self Yes Yes   Sig: Take 1 tablet by mouth 2 times daily.   apixaban ANTICOAGULANT (ELIQUIS) 5 MG tablet   Yes Yes   Sig: Take 1 tablet by mouth 2 times daily.   aspirin 81 MG EC tablet  Self Yes Yes   Sig: Take 1 tablet by mouth daily.   atorvastatin (LIPITOR) 40 MG tablet  Self Yes Yes   Sig: Take 1 tablet by mouth at bedtime.   doxazosin (CARDURA) 8 MG tablet  Self Yes Yes   Sig: Take 1 tablet by mouth at bedtime.   hydroCHLOROthiazide 12.5 MG tablet  Self Yes Yes   Sig: Take 1 tablet by mouth daily.   lisinopril (ZESTRIL) 5 MG tablet   Yes Yes   Sig: Take 15 mg by mouth daily.   metoprolol tartrate (LOPRESSOR) 25 MG tablet  Self Yes Yes   Sig: Take 1 tablet by mouth 2 times daily.   sildenafil (VIAGRA) 100 MG tablet Not Taking Self Yes No   Sig: Take 100 mg by mouth as needed.   Patient not taking: Reported on 7/1/2025   vitamin B-12 (CYANOCOBALAMIN) 250 MCG tablet  Self Yes Yes   Sig: Take 1 tablet by mouth daily.      Facility-Administered Medications: None     Allergies   Allergies   Allergen Reactions    Alendronate     Simvastatin        Immunization History   Immunization History   Administered Date(s) Administered    COVID-19 12+ (Pfizer) 10/16/2023, 09/05/2024    COVID-19 Bivalent 12+ (Pfizer) 10/28/2022, 03/24/2023    COVID-19 Monovalent 18+ (Moderna) 01/12/2021, 02/09/2021, 12/21/2021, 05/11/2022       Social History   I have reviewed this patient's social history and  "updated it with pertinent information if needed. Angelica Maharaj      Family History   I have reviewed this patient's family history and updated it with pertinent information if needed.   No family history on file.    Review of Systems   The 10 point Review of Systems is negative    Physical Exam   Temp: 97.6  F (36.4  C) Temp src: Oral BP: (!) 161/56 Pulse: 75   Resp: 20 SpO2: 95 % O2 Device: None (Room air)    Vital Signs with Ranges  Temp:  [97.6  F (36.4  C)-98.7  F (37.1  C)] 97.6  F (36.4  C)  Pulse:  [65-79] 75  Resp:  [20-22] 20  BP: (113-161)/(39-56) 161/56  SpO2:  [95 %-97 %] 95 %  0 lbs 0 oz  There is no height or weight on file to calculate BMI.    GENERAL APPEARANCE:  awake mild cognitive impairment evident  EYES: Eyes grossly normal to inspection  NECK: no adenopathy  RESP: lungs clear   CV: regular rates and rhythm  LYMPHATICS: normal ant/post cervical and supraclavicular nodes  ABDOMEN: soft, nontender  MS: extremities normal  SKIN: no suspicious lesions or rashes  Foot with mostly gangrenous changes and ischemic changes but some erythema slight warmth      Data   All laboratory and imaging data in the past 24 hours reviewed  No results for input(s): \"CULT\" in the last 168 hours.  No lab results found.    Invalid input(s): \"UC\"       All cultures:  Recent Labs   Lab 07/01/25  1006   CULTURE No growth after 1 day  No growth after 1 day      Blood culture:  Results for orders placed or performed during the hospital encounter of 07/01/25   Blood Culture Peripheral blood (BC) Hand, Left    Collection Time: 07/01/25 10:06 AM    Specimen: Hand, Left; Peripheral blood (BC)   Result Value Ref Range    Culture No growth after 1 day    Blood Culture Peripheral blood (BC) Hand, Right    Collection Time: 07/01/25 10:06 AM    Specimen: Hand, Right; Peripheral blood (BC)   Result Value Ref Range    Culture No growth after 1 day       Urine culture:  No results found for this or any previous visit.          "

## 2025-07-02 NOTE — PLAN OF CARE
Goal Outcome Evaluation:      Plan of Care Reviewed With: patient    Overall Patient Progress: no changeOverall Patient Progress: no change    Outcome Evaluation: discharge plans pending bypass surgery and PT OT recs may need HHC or TCU

## 2025-07-02 NOTE — ANESTHESIA PREPROCEDURE EVALUATION
Anesthesia Pre-Procedure Evaluation    Patient: Angelica Maharaj   MRN: [id number] : 11/10/1932          Procedure : Procedure(s):  CREATION, BYPASS, ARTERIAL, FEMORAL TO FEMORAL, USING GRAFT         Past Medical History:   Diagnosis Date    BPH (benign prostatic hyperplasia)     Coronary artery disease     s/p CABG    Iron deficiency anemia     Peripheral artery disease     S/p bilateral femoral to below-knee popliteal artery bypass graft      No past surgical history on file.   Allergies   Allergen Reactions    Alendronate     Simvastatin       Social History     Tobacco Use    Smoking status: Not on file    Smokeless tobacco: Not on file   Substance Use Topics    Alcohol use: Not on file      Wt Readings from Last 1 Encounters:   25 85.7 kg (189 lb)        Anesthesia Evaluation    Type: General.        ROS/MED HX  ENT/Pulmonary:    (-) tobacco use, asthma and sleep apnea   Neurologic: Comment: Cognitive impairment    (+)    no peripheral neuropathy                         (-) no seizures and no CVA   Cardiovascular:     (+)  hypertension- Peripheral Vascular Disease-  CAD -  CABG-date: ~. -                                 Previous cardiac testing   Echo: Date: Results:    Stress Test:  Date:  Results:  Myocardial perfusion imaging is normal without evidence of infarct or   ischemia.     Stress test findings suggests that patient is low risk (<1% annual cardiac   mortality rate).     Overall left ventricular systolic function is normal calculated at 63%   without regional wall motion abnormalities.       ECG Reviewed:  Date: Results:    Cath:  Date: Results:   (-) arrhythmias   METS/Exercise Tolerance:     Hematologic:     (+) History of blood clots,               Musculoskeletal:       GI/Hepatic:    (-) GERD   Renal/Genitourinary:     (+) renal disease, type: CRI,            Endo:    (-) Type II DM and thyroid disease   Psychiatric/Substance Use:       Infectious Disease:    (-) Recent Fever  "  Malignancy:       Other:              Physical Exam  Airway  Mallampati: II  TM distance: >3 FB  Neck ROM: full    Cardiovascular - normal exam   Dental   (+) Multiple visibly decayed, broken teeth      Pulmonary - normal exam      Neurological   Other Findings       OUTSIDE LABS:  CBC:   Lab Results   Component Value Date    WBC 7.2 07/02/2025    WBC 8.0 07/01/2025    HGB 10.1 (L) 07/02/2025    HGB 10.9 (L) 07/01/2025    HCT 32.3 (L) 07/02/2025    HCT 34.1 (L) 07/01/2025     07/02/2025     07/01/2025     BMP:   Lab Results   Component Value Date     07/02/2025     07/01/2025    POTASSIUM 4.3 07/02/2025    POTASSIUM 4.4 07/01/2025    CHLORIDE 107 07/02/2025    CHLORIDE 101 07/01/2025    CO2 24 07/02/2025    CO2 26 07/01/2025    BUN 25.4 (H) 07/02/2025    BUN 33.6 (H) 07/01/2025    CR 1.03 07/02/2025    CR 1.20 (H) 07/01/2025     (H) 07/02/2025     (H) 07/01/2025     COAGS:   Lab Results   Component Value Date    PTT 34 07/01/2025    INR 1.60 (H) 07/01/2025     POC: No results found for: \"BGM\", \"HCG\", \"HCGS\"  HEPATIC:   Lab Results   Component Value Date    ALBUMIN 3.3 (L) 07/01/2025    PROTTOTAL 6.9 07/01/2025    ALT 14 07/01/2025    AST 36 07/01/2025    ALKPHOS 79 07/01/2025    BILITOTAL 0.4 07/01/2025     OTHER:   Lab Results   Component Value Date    LACT 1.1 07/01/2025    ALCIDES 9.0 07/02/2025    MAG 1.8 04/18/2025    TSH 2.60 04/18/2025       Anesthesia Plan    ASA Status:  3      NPO Status: NPO Appropriate   Anesthesia Type: General.  Airway: oral.  Induction: intravenous.   Techniques and Equipment:       - Monitoring Plan: standard ASA monitoring     Consents    Anesthesia Plan(s) and associated risks, benefits, and realistic alternatives discussed. Questions answered and patient/representative(s) expressed understanding.     - Discussed:     - Discussed with:  Patient          Blood Consent:      - Consented: consented to blood products     Postoperative Care    Pain " management: multimodal analgesia.     Comments:                   Harjeet Kessler MD    I have reviewed the pertinent notes and labs in the chart from the past 30 days and (re)examined the patient.  Any updates or changes from those notes are reflected in this note.    Clinically Significant Risk Factors Present on Admission               # Hypoalbuminemia: Lowest albumin = 3.3 g/dL at 7/1/2025 10:06 AM, will monitor as appropriate  # Drug Induced Coagulation Defect: home medication list includes an anticoagulant medication  # Drug Induced Platelet Defect: home medication list includes an antiplatelet medication   # Hypertension: Noted on problem list      # Anemia: based on hgb <11           # Financial/Environmental Concerns:

## 2025-07-02 NOTE — ANESTHESIA PROCEDURE NOTES
Airway       Patient location during procedure: OR       Procedure Start/Stop Times: 7/2/2025 4:17 PM  Staff -        Anesthesiologist:  Harjeet Kessler MD       CRNA: Bill Abrams APRN CRNA       Performed By: CRNA  Consent for Airway        Urgency: elective  Indications and Patient Condition       Indications for airway management: wisam-procedural and airway protection       Induction type:intravenous       Mask difficulty assessment: 2 - vent by mask + OA or adjuvant +/- NMBA    Final Airway Details       Final airway type: endotracheal airway       Successful airway: ETT - single and Oral  Endotracheal Airway Details        ETT size (mm): 8.0       Cuffed: yes       Successful intubation technique: direct laryngoscopy       DL Blade Type: Lemon 2       Grade View of Cords: 2       Adjucts: stylet       Position: Right       Measured from: lips       Secured at (cm): 23       Bite block used: None    Post intubation assessment        Placement verified by: capnometry, equal breath sounds and chest rise        Number of attempts at approach: 1       Number of other approaches attempted: 0       Secured with: tape       Ease of procedure: easy       Dentition: Unchanged    Medication(s) Administered   Medication Administration Time: 7/2/2025 4:17 PM

## 2025-07-02 NOTE — PROGRESS NOTES
[facility]    Medicine Progress Note - Hospitalist Service    Date of Admission:  7/1/2025    Assessment & Plan   Angelica Maharaj is a 89 year old male with past medical history significant for peripheral artery disease s/p bilateral femoral to below-knee popliteal artery bypass, coronary artery disease s/p CABG, hx of DVT, cognitive impairment who presents with left foot pain, found to have dry gangrene and significant PAD, transferred to [facility] on 7/1/2025 for further evaluation and treatment     Left foot cellulitis with dry gangrene and possible osteomyelitis   Peripheral artery disease s/p bilateral femoral to below-knee popliteal artery bypass  *Presents with progressive pain in left foot/toe   *CTA runoff with previously seen occlusions as noted in read, with partial obscured left femoral to below-knee popliteal artery bypass graft with apparent significant stenoses within the mid distal portions of the graft  *Left foot XR with cortical irregularity along the distal tuft of the distal phalanx of great toe, equivocal for osteomyelitis  *Case discussed with vascular surgery prior to transfer, recommended transfer for formal evaluation and discussion with patient/family regarding potential interventions  - Vascular surgery consulted  - Podiatry consulted  - Consulted ID as findings concerning for osteomyelitis, patient will most likely need midline and outpatient IV antibiotics setup at discharge so consulted SW as well  - Cefepime IV, metronidazole IV. Discontinue vancomycin given negative MRSA swab. Otherwise, will await ID recs. [id number]   - MRI left foot  - Hold prior to admission apixaban pending surgical evaluations  - Continue prior to admission aspirin, atorvastatin   - Acetaminophen PO, oxycodone PO, hydromorphone IV PRN    Hypertension  - Continue prior to admission metoprolol   - Hold prior to admission lisinopril, hydrochlorothiazide pending surgical evaluation /  possible OR    Hx of DVT  Diagnosed 4/19/2025. US negative for DVT in ED.   - Hold prior to admission apixaban as abvoe     Coronary artery disease s/p CABG  - Medication management as above     Chronic kidney disease, stage 2  Baseline creatinine 0.9-1. Creatinine mildly elevated at 1.20 on admission but now back to baseline.  - IVF overnight   - Avoid nephrotoxins  - BMP in AM  - Hold PTA lisinopril and hydrochlorothiazide as above     BPH  - Continue prior to admission doxazosin     Cognitive impairment  Recent SLUMS of 8 per review of discharge summary 4/2025. No formal diagnosis of dementia per niece.   - Re-orient as needed  - Maintain normal day/night, sleep wake cycles  - Minimize sedating/altering medications as able  - Treat separate conditions as detailed above/below     Chronic normocytic anemia  Hx of iron deficiency anemia  Hgb 10.9 g/dl, at recent baseline.   - Monitor CBC           Diet: NPO for Procedure/Surgery per Anesthesia Guidelines Except for: Meds; Clear liquids before procedure/surgery: ADULT (Age GREATER than or Equal to 18 years) - Clear liquids 2 hours before procedure/surgery  NPO for Procedure/Surgery per Anesthesia Guidelines Except for: Meds; Clear liquids before procedure/surgery: ADULT (Age GREATER than or Equal to 18 years) - Clear liquids 2 hours before procedure/surgery    DVT Prophylaxis: Pneumatic Compression Devices  Boyd Catheter: Not present  Lines: None     Cardiac Monitoring: None  Code Status: Full Code      Clinically Significant Risk Factors Present on Admission               # Hypoalbuminemia: Lowest albumin = 3.3 g/dL at 7/1/2025 10:06 AM, will monitor as appropriate    # Drug Induced Coagulation Defect: home medication list includes an anticoagulant medication  # Drug Induced Platelet Defect: home medication list includes an antiplatelet medication   # Hypertension: Noted on problem list      # Anemia: based on hgb <11           # Financial/Environmental Concerns:            Social Drivers of Health    Tobacco Use: Medium Risk (4/11/2024)    Received from [facility]     Patient History     Smoking Tobacco Use: Former     Smokeless Tobacco Use: Former          Disposition Plan     Medically Ready for Discharge: Anticipated in 2-4 Days pending vascular surgery eval, possible surgery, likely home antibiotic setup              Darren Deleon MD  Hospitalist Service  [facility] [place]  Securely message with [employer] (more info)  Text page via [facility] Paging/Directory   ______________________________________________________________________    Interval History   Admitted yesterday for dry gangrene, cellulitis, and possible osteomyelitis. Patient states his foot has been looking worse over the last 2-3 weeks and he has associated pain which is worsening.    Physical Exam   Vital Signs: Temp: 97.6  F (36.4  C) Temp src: Oral BP: (!) 161/56 Pulse: 75   Resp: 20 SpO2: 95 % O2 Device: None (Room air)    Weight: 0 lbs 0 oz  Constitutional: Awake, alert, cooperative, no apparent distress.    Pulmonary: No increased work of breathing, good air exchange, clear to auscultation bilaterally, no crackles or wheezing.  Cardiovascular: Regular rate and rhythm, normal S1 and S2, no S3 or S4. No murmurs, rubs, or gallops noted.  GI: Normal bowel sounds, soft, non-distended, non-tender.  No guarding or rebound.  Extremities: LLE non-palpable DP/PT pulses. Patient has motor function and sensation to the left foot. There is dry gangrene of the 1st and 3rd toes with associated tenderness during exam.   Neuro: A&Ox4. Conversant. Responds appropriately in conversation. Moves all extremities with no focal deficit identified.           Medical Decision Making       45 MINUTES SPENT BY ME on the date of service doing chart review, history, exam, documentation & further activities per the note.      Data   ------------------------- PAST 24 HR DATA REVIEWED  -----------------------------------------------    I have personally reviewed the following data over the past 24 hrs:    7.2  \   10.1 (L)   / 215     141 107 25.4 (H) /  104 (H)   4.3 24 1.03 \       Imaging results reviewed over the past 24 hrs:   No results found for this or any previous visit (from the past 24 hours).

## 2025-07-02 NOTE — PLAN OF CARE
Goal Outcome Evaluation:      Plan of Care Reviewed With: patient    Overall Patient Progress: no changeOverall Patient Progress: no change    Shift: 11p-7a  Surgery/POD#: Pt here for gangrene on L great toe, possible osteomyelitis, occlusion of LLE vessels.  Behavior & Aggression: Green  Fall Risk: Yes  Orientation: A&O x4  ABNL VS/O2: VSS on RA  Tele: NA  ABNL Labs: K replacement protocol - recheck this AM; Cr 1.2, Hgb 10.9  Pain Management: PRN tylenol x1  Bowel/Bladder: using urinal, no BM overnight  Drains: NA  Lines: PIV infusing LR @ 100 ml/hr, int Abx  Skin: left great toe gangrene JAZZ  Diet: NPO, denies N/V  Activity Level: x1 gb/pivot (per notes)  Number of times OUT OF BED this shift: 0  Tests/Procedures: MRSA swab sent, MRI today?  Anticipated  DC Date: pending  Significant Information:   Podiatry, Vascular & SW consults. Hospitalist following. Pt irritable a times overnight - refused PO meds last night and a complete assessment, he just wanted to be left alone to sleep.

## 2025-07-02 NOTE — H&P
[facility]    History and Physical - Hospitalist Service       Date of Admission:  7/1/2025    Assessment & Plan   Angelica Maharaj is a 89 year old male with past medical history significant for peripheral artery disease s/p bilateral femoral to below-knee popliteal artery bypass, coronary artery disease s/p CABG, hx of DVT, cognitive impairment who presents with left foot pain, found to have dry gangrene and significant PAD, transferred to [facility] on 7/1/2025 for further evaluation and treatment     Left foot cellulitis with dry gangrene and possible osteomyelitis   Peripheral artery disease s/p bilateral femoral to below-knee popliteal artery bypass  *Presents with progressive pain in left foot/toe   *CTA runoff with previously seen occlusions as noted in read, with partial obscured left femoral to below-knee popliteal artery bypass graft with apparent significant stenoses within the mid distal portions of the graft  *Left foot XR with cortical irregularity along the distal tuft of the distal phalanx of great toe, equivocal for osteomyelitis  *Case discussed with vascular surgery prior to transfer, recommended transfer for formal evaluation and discussion with patient/family regarding potential interventions  - Vascular surgery consulted  - Podiatry consulted  - Cefepime IV, metronidazole IV, vancomycin IV  - MRSA swab  - MRI left foot  - Hold prior to admission apixaban pending surgical evaluations  - Continue prior to admission aspirin, atorvastatin   - Acetaminophen PO, oxycodone PO, hydromorphone IV PRN    Hypertension  - Continue prior to admission metoprolol   - Hold prior to admission lisinopril, hydrochlorothiazide pending surgical evaluation / possible OR    Hx of DVT  Diagnosed 4/19/2025. US negative for DVT in ED.   - Hold prior to admission apixaban as abvoe     Coronary artery disease s/p CABG  - Medication management as above     Chronic kidney disease, stage  2  Baseline creatinine 0.9-1. Creatinine mildly elevated at 1.20 on admission.   - IVF overnight   - Avoid nephrotoxins  - BMP in AM  - Hold PTA lisinopril and hydrochlorothiazide as above     BPH  - Continue prior to admission doxazosin     Cognitive impairment  Recent SLUMS of 8 per review of discharge summary 4/2025. No formal diagnosis of dementia per niece.   - Re-orient as needed  - Maintain normal day/night, sleep wake cycles  - Minimize sedating/altering medications as able  - Treat separate conditions as detailed above/below     Chronic normocytic anemia  Hx of iron deficiency anemia  Hgb 10.9 g/dl, at recent baseline.   - Monitor CBC        Diet: Combination Diet Regular Diet Adult  NPO for Procedure/Surgery per Anesthesia Guidelines Except for: Meds; Clear liquids before procedure/surgery: ADULT (Age GREATER than or Equal to 18 years) - Clear liquids 2 hours before procedure/surgery   DVT Prophylaxis: Hold DOAC as above, PCDs deferred due to significant PAD. Resume DOAC versus chemical ppx once evaluated by surgery teams  Boyd Catheter: Not present  Code Status: Full Code     Disposition Plan   Admit to inpatient.      Medically Ready for Discharge: Anticipated in 2-4 Days       Entered: Markos Murray MD 07/01/2025, 10:22 PM     The patient's care was discussed with the patient, bedside RN and patient's niece (healthcare agent)        Clinically Significant Risk Factors Present on Admission               # Hypoalbuminemia: Lowest albumin = 3.3 g/dL at 7/1/2025 10:06 AM, will monitor as appropriate    # Drug Induced Coagulation Defect: home medication list includes an anticoagulant medication  # Drug Induced Platelet Defect: home medication list includes an antiplatelet medication   # Hypertension: Noted on problem list      # Anemia: based on hgb <11           # Financial/Environmental Concerns:                Markos Murray MD  [facility]    ______________________________________________________________________    Chief Complaint   Left foot pain     History of Present Illness   Angelica Maharaj is a 89 year old male who presents with the above chief complaint.    History is obtained from the patient and review of medical record.  History from patient potentially limited due to cognitive impairment. The patient reports he has had increased pain in his left foot and toe, especially with walking, for the past several days. He reports he noted his left toe had started to turn black a few weeks ago. His niece reports she last saw him 3-4 weeks ago, but did not see his feet at that time. He presented to the Emergency Department at [facility] where he was found to have dry gangrene of his left first toe with associated cellulitis, CTA runoff was obtained with apparent significant stenoses within the mid distal portions of his prior graft as detailed above, x-ray of his left toe was equivocal for osteomyelitis.  He was given IV antibiotics, case was discussed with vascular surgery and he was transferred to [facility] for formal vascular surgery evaluation.    Past Medical History    I have reviewed this patient's medical history and updated it with pertinent information if needed.   Past Medical History:   Diagnosis Date    BPH (benign prostatic hyperplasia)     Coronary artery disease     s/p CABG    Iron deficiency anemia     Peripheral artery disease     S/p bilateral femoral to below-knee popliteal artery bypass graft        Social History   Former smoker. Denies alcohol use.         Prior to Admission Medications     Prior to Admission Medications   Prescriptions Last Dose Informant Patient Reported? Taking?   HYDROcodone-acetaminophen (NORCO) 7.5-325 MG per tablet  Self Yes Yes   Sig: Take 1 tablet by mouth every 6 hours as needed for severe pain, pain or moderate to severe pain.   Vitamin D3 (VITAMIN D, CHOLECALCIFEROL,) 25 mcg (1000  units) tablet  Self Yes Yes   Sig: Take 1 tablet by mouth 2 times daily.   apixaban ANTICOAGULANT (ELIQUIS) 5 MG tablet   Yes Yes   Sig: Take 1 tablet by mouth 2 times daily.   aspirin 81 MG EC tablet  Self Yes Yes   Sig: Take 1 tablet by mouth daily.   atorvastatin (LIPITOR) 40 MG tablet  Self Yes Yes   Sig: Take 1 tablet by mouth at bedtime.   doxazosin (CARDURA) 8 MG tablet  Self Yes Yes   Sig: Take 1 tablet by mouth at bedtime.   hydroCHLOROthiazide 12.5 MG tablet  Self Yes Yes   Sig: Take 1 tablet by mouth daily.   lisinopril (ZESTRIL) 5 MG tablet   Yes Yes   Sig: Take 15 mg by mouth daily.   metoprolol tartrate (LOPRESSOR) 25 MG tablet  Self Yes Yes   Sig: Take 1 tablet by mouth 2 times daily.   sildenafil (VIAGRA) 100 MG tablet Not Taking Self Yes No   Sig: Take 100 mg by mouth as needed.   Patient not taking: Reported on 7/1/2025   vitamin B-12 (CYANOCOBALAMIN) 250 MCG tablet  Self Yes Yes   Sig: Take 1 tablet by mouth daily.      Facility-Administered Medications: None     Allergies   Allergies   Allergen Reactions    Alendronate     Simvastatin        Physical Exam   Vital Signs: Temp: 98.7  F (37.1  C) Temp src: Oral BP: (!) 151/54 Pulse: 79   Resp: 22 SpO2: 97 % O2 Device: None (Room air)    Weight: 0 lbs 0 oz    Constitutional: NAD  Respiratory: Clear to auscultation bilaterally, good air movement, normal effort   Cardiovascular: RRR. Lower extremity edema to left lower shin.    GI: Soft, non-tender, non-distended. No rebound tenderness or guarding.    Skin/Musculoskeletal: Warm, dry         Neurologic: Alert. Responding to questions appropriately. Following commands. Oriented to person and place, not date   Psychiatric: Normal affect, appropriate      Data   Data reviewed today: I reviewed all medications, new labs and imaging results over the last 24 hours. I personally reviewed the EKG tracing showing sinus rhythm with RBBB.    Recent Labs   Lab 07/01/25  1006   WBC 8.0   HGB 10.9*   MCV 84       INR 1.60*      POTASSIUM 4.4   CHLORIDE 101   CO2 26   BUN 33.6*   CR 1.20*   ANIONGAP 11   ALCIDES 8.9   *   ALBUMIN 3.3*   PROTTOTAL 6.9   BILITOTAL 0.4   ALKPHOS 79   ALT 14   AST 36       Recent Results (from the past 24 hours)   US Lower Extremity Venous Duplex Left    Narrative    EXAM: US LOWER EXTREMITY VENOUS DUPLEX LEFT  LOCATION: [facility]  DATE: 7/1/2025    INDICATION: edema, necrotic leg wound.  edema  COMPARISON: None.  TECHNIQUE: Venous Duplex ultrasound of the left lower extremity with and without compression, augmentation and duplex. Color flow and spectral Doppler with waveform analysis performed.    FINDINGS: Exam includes the common femoral, femoral, popliteal, and contralateral common femoral veins as well as segmentally visualized deep calf veins and greater saphenous vein.     LEFT: No deep vein thrombosis. No superficial thrombophlebitis. No popliteal cyst.      Impression    IMPRESSION:  1.  No deep venous thrombosis in the left lower extremity.   CTA Abdomen Pelvis Runoff w Contrast    Narrative    EXAM: CTA ABDOMEN PELVIS RUNOFF W CONTRAST  LOCATION: [facility]  DATE: 7/1/2025    INDICATION: suspect critical limb ischemia and necrotic foot wound left leg.  s p abnormal MARISA in april  COMPARISON: CT of the abdomen and pelvis with lower extremity runoff dated 10/11/2023  TECHNIQUE: Helical acquisition through the abdomen, pelvis, and bilateral lower extremities was performed during the arterial phase of contrast enhancement using IV Contrast. 2D and 3D reconstructions were performed by the CT technologist. Dose reduction   techniques were used.   CONTRAST: 100 cc    FINDINGS:  AORTA: Again identified are changes consistent with endovascular repair of an infrarenal abdominal aortic aneurysm utilizing a bifurcated endograft. Again noted is an occlusion of the left limb which terminates in the distal abdominal aorta. The right    limb is patent without significant stenosis.    Again noted is an occlusion of the celiac trunk. Superior mesenteric artery is patent without significant stenosis. The origin of the inferior mesenteric artery is occluded. The renal arteries are patent.    Iliac arteries: The right common iliac artery distal to the right limb of the endograft and the right external iliac artery are patent without significant stenoses. The right internal iliac artery is occluded.    The left common, external, and internal iliac arteries are occluded.    RIGHT LEG:   Common femoral artery: Calcified plaque. Severe stenosis just proximal to a right femoral to below-knee popliteal artery bypass graft.  Profunda femoris artery: No significant stenosis.  Superficial femoral artery: Long segment occlusion.  Popliteal artery: Occluded above the knee. Patent below the knee without significant stenosis.  Tibial arteries: Dominant runoff artery is the peroneal artery. There are multiple significant stenoses in the posterior tibial artery. There are multiple occlusions in the anterior tibial artery.    LEFT LEG:   Common femoral artery: Calcified plaque. No significant stenosis.  Profunda femoris artery: No definite significant stenosis.  Superficial femoral artery: Long segment occlusion  Right femoral to below-knee popliteal artery bypass graft: Partially obscured due to motion artifact. There appear to be significant stenoses within the graft at its mid distal aspects.  Popliteal artery: Poorly opacified and therefore cannot be adequately evaluated.  Tibial arteries: Primarily single vessel runoff via the peroneal artery.    LUNG BASES: No acute infiltrates.    ABDOMEN: Small cysts in the left lobe of liver. Again noted are bilateral adrenal nodules. These are unchanged in size. There are scattered colonic diverticuli. No evidence for acute diverticulitis. The prostate is enlarged in size. There are multilevel   degenerative changes in the  lumbar spine.       Impression    IMPRESSION:  1.  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 Occluded left common, external, and internal iliac arteries.  2.  Occluded celiac trunk.  3.  Long segment occlusion of the right superficial femoral artery. Occluded above-knee right popliteal artery. Patent right femoral to below-knee popliteal artery bypass graft. Significant stenosis in the common femoral artery just proximal to the   proximal anastomosis of this graft. This is not significantly changed when compared the prior exam.  4.  Long segment occlusion of the left superficial femoral artery. Partially obscured left femoral to below-knee popliteal artery bypass graft. There appear to be significant stenoses within the mid distal portions of the graft.  5.  Dominant runoff via the peroneal arteries bilaterally.   XR Foot Port Left 3 Views    Narrative    EXAM: XR FOOT PORT LEFT 3 VIEWS  LOCATION: [facility]  DATE: 7/1/2025    INDICATION: Left foot necrotic wound.  COMPARISON: None.      Impression    IMPRESSION:   Soft tissue swelling and bones are demineralized. There is some cortical irregularity along the distal tuft of the distal phalanx great toe, equivocal for osteomyelitis. If there is ulceration in this area and further evaluation is needed, recommend MRI.    Hammertoe configurations. No evidence of an acute displaced fracture. Atherosclerotic vascular calcifications.    NOTE: ABNORMAL REPORT    THE DICTATION ABOVE DESCRIBES AN ABNORMALITY FOR WHICH FOLLOW-UP IS NEEDED.

## 2025-07-02 NOTE — PLAN OF CARE
Date/Time 7/2, 0700-1930    Trauma/Ortho/Medical (Choose one)  ortho/medical    Diagnosis: L great toe gangrene  Mental Status:A&OX2-3  Activity/dangle: 1 and walker  Diet: NPO,   Pain: tylenol  Boyd/Voiding: BR  Tele/Restraints/Iso:None  02/LDA: RA, IV infusing  D/C Date:TBD  Other Info: Left 3rd and 5th toe wound/black. Surgery at 1600.

## 2025-07-02 NOTE — CONSULTS
Care Management Initial Consult    General Information  Assessment completed with: Patient,    Type of CM/SW Visit: Initial Assessment    Primary Care Provider verified and updated as needed: No   Readmission within the last 30 days:        Reason for Consult: discharge planning  Advance Care Planning: Advance Care Planning Reviewed: present on chart          Communication Assessment  Patient's communication style: spoken language (English or Bilingual)             Cognitive  Cognitive/Neuro/Behavioral: .WDL except        Orientation: disoriented to, time             Living Environment:   People in home: alone     Current living Arrangements: apartment      Able to return to prior arrangements: yes       Family/Social Support:  Care provided by: self  Provides care for: no one  Marital Status: Single  Support system: Other (specify) (Donita Isidro)          Description of Support System: Supportive, Involved         Current Resources:   Patient receiving home care services: No        Community Resources:    Equipment currently used at home: walker, standard  Supplies currently used at home: None    Employment/Financial:  Employment Status: retired        Financial Concerns:             Does the patient's insurance plan have a 3 day qualifying hospital stay waiver?  No    Lifestyle & Psychosocial Needs:  Social Drivers of Health     Food Insecurity: Low Risk  (4/18/2025)    Food Insecurity     Within the past 12 months, did you worry that your food would run out before you got money to buy more?: No     Within the past 12 months, did the food you bought just not last and you didn t have money to get more?: No   Depression: Not at risk (12/14/2023)    Received from [facility]     PHQ-2     PHQ2 Total: 0   Housing Stability: Low Risk  (4/18/2025)    Housing Stability     Do you have housing? : Yes     Are you worried about losing your housing?: No   Tobacco Use: Medium Risk (4/11/2024)    Received from [facility]     Patient History     Smoking Tobacco Use: Former     Smokeless Tobacco Use: Former     Passive Exposure: Not on file   Financial Resource Strain: Low Risk  (4/18/2025)    Financial Resource Strain     Within the past 12 months, have you or your family members you live with been unable to get utilities (heat, electricity) when it was really needed?: No   Alcohol Use: Not on file   Transportation Needs: Low Risk  (4/18/2025)    Transportation Needs     Within the past 12 months, has lack of transportation kept you from medical appointments, getting your medicines, non-medical meetings or appointments, work, or from getting things that you need?: No   Physical Activity: Not on file   Interpersonal Safety: Low Risk  (4/19/2025)    Interpersonal Safety     Do you feel physically and emotionally safe where you currently live?: Yes     Within the past 12 months, have you been hit, slapped, kicked or otherwise physically hurt by someone?: No     Within the past 12 months, have you been humiliated or emotionally abused in other ways by your partner or ex-partner?: No   Stress: Not on file   Social Connections: Not on file   Health Literacy: Not on file       Functional Status:  Prior to admission patient needed assistance:   Dependent ADLs:: Independent  Dependent IADLs:: Transportation       Mental Health Status:          Chemical Dependency Status:                Values/Beliefs:  Spiritual, Cultural Beliefs, Yazidi Practices, Values that affect care:                 Discussed  Partnership in Safe Discharge Planning  document with patient/family: No    Additional Information:  Writer met with patient at bedside today and introduced self and role  in discharge planning.  Patient a bit reluctant to talk with writer.  [facility] a [facility].  Patient shared that he lives alone in a first floor apartment.  His niece Donita plans to stay with him for 3 days after he  is discharged.  At baseline patient reports that he has been using a walker & is otherwise indep.  He stated some other Private party makes his meals but did not elaborate who that is.  Writer explained we will need to know this for discharge planning and will visit with him again closer to discharge    Next Steps: watch for recs after bypass surgery and PT OT have seen him    Yeimi Toney RN

## 2025-07-02 NOTE — CONSULTS
[facility]  CONSULTATION NOTE    CHIEF COMPLAINT:      I was asked by Markos Murray MD  to evaluate this patient for left foot.    PATIENT HISTORY:  Angelica Maharaj is a 89 year old male  with a past medical history significant for what's listed below, presented for worsening pain to his left foot. He was transferred for evaluation by vascular surgery and may undergo a bypass.   -Podiatry has been consulted for his left foot. Has a gangrenous lesion to the distal hallux and the third toe. States both sites are painful. Is sleeping on evaluation. Denies N/F/V/C/D        Review of Systems:  A 10 point review of systems was performed and is positive for that noted above in the patient history.  All other areas are negative.     PAST MEDICAL HISTORY:   Past Medical History:   Diagnosis Date    BPH (benign prostatic hyperplasia)     Coronary artery disease     s/p CABG    Iron deficiency anemia     Peripheral artery disease     S/p bilateral femoral to below-knee popliteal artery bypass graft        PAST SURGICAL HISTORY: No past surgical history on file.     MEDICATIONS:  Reviewed in Epic. Current.     ALLERGIES:    Allergies   Allergen Reactions    Alendronate     Simvastatin         SOCIAL HISTORY:   Social History     Socioeconomic History    Marital status: Single     Spouse name: Not on file    Number of children: Not on file    Years of education: Not on file    Highest education level: Not on file   Occupational History    Not on file   Tobacco Use    Smoking status: Not on file    Smokeless tobacco: Not on file   Substance and Sexual Activity    Alcohol use: Not on file    Drug use: Not on file    Sexual activity: Not on file   Other Topics Concern    Not on file   Social History Narrative    Not on file     Social Drivers of Health     Financial Resource Strain: Low Risk  (4/18/2025)    Financial Resource Strain     Within the past 12 months, have you or your family members  you live with been unable to get utilities (heat, electricity) when it was really needed?: No   Food Insecurity: Low Risk  (4/18/2025)    Food Insecurity     Within the past 12 months, did you worry that your food would run out before you got money to buy more?: No     Within the past 12 months, did the food you bought just not last and you didn t have money to get more?: No   Transportation Needs: Low Risk  (4/18/2025)    Transportation Needs     Within the past 12 months, has lack of transportation kept you from medical appointments, getting your medicines, non-medical meetings or appointments, work, or from getting things that you need?: No   Physical Activity: Not on file   Stress: Not on file   Social Connections: Not on file   Interpersonal Safety: Low Risk  (4/19/2025)    Interpersonal Safety     Do you feel physically and emotionally safe where you currently live?: Yes     Within the past 12 months, have you been hit, slapped, kicked or otherwise physically hurt by someone?: No     Within the past 12 months, have you been humiliated or emotionally abused in other ways by your partner or ex-partner?: No   Housing Stability: Low Risk  (4/18/2025)    Housing Stability     Do you have housing? : Yes     Are you worried about losing your housing?: No        FAMILY HISTORY: No family history on file.     EXAM:Vitals: BP (!) 161/56 (BP Location: Left arm)   Pulse 75   Temp 97.6  F (36.4  C) (Oral)   Resp 20   SpO2 95%   BMI= There is no height or weight on file to calculate BMI.    LABS:   .a1c  Last Comprehensive Metabolic Panel:  Sodium   Date Value Ref Range Status   07/02/2025 141 135 - 145 mmol/L Final     Potassium   Date Value Ref Range Status   07/02/2025 4.3 3.4 - 5.3 mmol/L Final     Chloride   Date Value Ref Range Status   07/02/2025 107 98 - 107 mmol/L Final     Carbon Dioxide (CO2)   Date Value Ref Range Status   07/02/2025 24 22 - 29 mmol/L Final     Anion Gap   Date Value Ref Range Status    07/02/2025 10 7 - 15 mmol/L Final     Glucose   Date Value Ref Range Status   07/02/2025 104 (H) 70 - 99 mg/dL Final     GLUCOSE BY METER POCT   Date Value Ref Range Status   04/18/2025 188 (H) 70 - 99 mg/dL Final     Urea Nitrogen   Date Value Ref Range Status   07/02/2025 25.4 (H) 8.0 - 23.0 mg/dL Final     Creatinine   Date Value Ref Range Status   07/02/2025 1.03 0.67 - 1.17 mg/dL Final     GFR Estimate   Date Value Ref Range Status   07/02/2025 68 >60 mL/min/1.73m2 Final     Comment:     eGFR calculated using 2021 CKD-EPI equation.     GFR, ESTIMATED POCT   Date Value Ref Range Status   10/11/2023 >60 >60 mL/min/1.73m2 Final     Calcium   Date Value Ref Range Status   07/02/2025 9.0 8.8 - 10.4 mg/dL Final     Lab Results   Component Value Date    WBC 7.2 07/02/2025     Lab Results   Component Value Date    RBC 3.84 07/02/2025     Lab Results   Component Value Date    HGB 10.1 07/02/2025     Lab Results   Component Value Date    HCT 32.3 07/02/2025     Lab Results   Component Value Date     07/02/2025      Lab Results   Component Value Date    INR 1.60 07/01/2025        General appearance: Patient is alert and fully cooperative with history & exam.  No sign of distress is noted during the visit.      Respiratory: Breathing is regular & unlabored while sitting.      HEENT: Hearing is intact to spoken word.  Speech is clear.  No gross evidence of visual impairment that would impact ambulation.      Dermatologic: Left distal hallux gangrene: dry and well adhered. Minimal erythema. No drainage.   Third digit distal toe eschar. Small amount of drainage at nailbed.      Vascular: Dorsalis pedis and posterior tibial pulses are difficult to palpate.     Neurologic: Lower extremity sensation is diminished, bilateral foot, to light touch.  No evidence of neurological-based weakness or contracture in the lower extremities.       Musculoskeletal: Patient is ambulatory without an assistive device or brace.  No gross  foot or ankle deformity noted.       Psychiatric: Affect is pleasant & appropriate.      All cultures:  Recent Labs   Lab 07/01/25  1006   CULTURE No growth after 1 day  No growth after 1 day        IMAGING:     IMPRESSION:   Soft tissue swelling and bones are demineralized. There is some cortical irregularity along the distal tuft of the distal phalanx great toe, equivocal for osteomyelitis. If there is ulceration in this area and further evaluation is needed, recommend MRI.     Hammertoe configurations. No evidence of an acute displaced fracture. Atherosclerotic vascular calcifications.    IMPRESSION:  1.  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 Occluded left common, external, and internal iliac arteries.  2.  Occluded celiac trunk.  3.  Long segment occlusion of the right superficial femoral artery. Occluded above-knee right popliteal artery. Patent right femoral to below-knee popliteal artery bypass graft. Significant stenosis in the common femoral artery just proximal to the   proximal anastomosis of this graft. This is not significantly changed when compared the prior exam.  4.  Long segment occlusion of the left superficial femoral artery. Partially obscured left femoral to below-knee popliteal artery bypass graft. There appear to be significant stenoses within the mid distal portions of the graft.  5.  Dominant runoff via the peroneal arteries bilaterally.       ASSESSMENT:   1. Left foot hallux distal toe gangrene, third digit gangrenous lesion  2. Peripheral Arterial Disease --> possible bypass planned for today      MEDICAL DECISION MAKING:   -Discussed all findings with patient. Chart and imaging reviewed.     -Discussed with patient left foot and likely need for debridements and possible partial toe amputations. MRI is ordered and pending to evaluate further for osteomyelitis. Sites currently clean and dry at this time. Can be left open to air.     -Can be full WB. Will be WB to heel of LLE post  op.     -Any podiatric intervention following vascular intervention.     -Will follow up with patient once MRI is complete to discus next steps.         Thank you for the consultation request and the opportunity to participate in the care of YAJAIRA Rowan Department of Podiatry/Foot & Ankle Surgery  Available via [employer]

## 2025-07-02 NOTE — PHARMACY-ADMISSION MEDICATION HISTORY
Pharmacist Admission Medication History    Admission medication history is complete. The information provided in this note is only as accurate as the sources available at the time of the update.    Information Source(s): Hospital records and Facility (TCU/NH/) medication list/MAR via N/A    Pertinent Information: Unknown if morning medications were taken 7/1 - looks like arrived to [facility] ED at 0930 so definitely could have taken them    Changes made to PTA medication list:  Added: None  Deleted: None  Changed: None    Allergies reviewed with patient and updates made in EHR: no    Medication History Completed By: Brenda Junior RPH 7/1/2025 8:52 PM    PTA Med List   Medication Sig Last Dose/Taking    apixaban ANTICOAGULANT (ELIQUIS) 5 MG tablet Take 1 tablet by mouth 2 times daily. Taking    aspirin 81 MG EC tablet Take 1 tablet by mouth daily. Taking    atorvastatin (LIPITOR) 40 MG tablet Take 1 tablet by mouth at bedtime. Taking    doxazosin (CARDURA) 8 MG tablet Take 1 tablet by mouth at bedtime. Taking    hydroCHLOROthiazide 12.5 MG tablet Take 1 tablet by mouth daily. Taking    HYDROcodone-acetaminophen (NORCO) 7.5-325 MG per tablet Take 1 tablet by mouth every 6 hours as needed for severe pain, pain or moderate to severe pain. Taking As Needed    lisinopril (ZESTRIL) 5 MG tablet Take 15 mg by mouth daily. Taking    metoprolol tartrate (LOPRESSOR) 25 MG tablet Take 1 tablet by mouth 2 times daily. Taking    vitamin B-12 (CYANOCOBALAMIN) 250 MCG tablet Take 1 tablet by mouth daily. Taking    Vitamin D3 (VITAMIN D, CHOLECALCIFEROL,) 25 mcg (1000 units) tablet Take 1 tablet by mouth 2 times daily. Taking

## 2025-07-02 NOTE — PHARMACY-VANCOMYCIN DOSING SERVICE
Pharmacy Vancomycin Initial Note  Date of Service 2025  Patient's  11/10/1932  89 year old, male    Indication: Osteomyelitis    Current estimated CrCl = Estimated Creatinine Clearance: 42.6 mL/min (A) (based on SCr of 1.2 mg/dL (H)).    Creatinine for last 3 days  2025: 10:06 AM Creatinine 1.20 mg/dL    Recent Vancomycin Level(s) for last 3 days  No results found for requested labs within last 3 days.      Vancomycin IV Administrations (past 72 hours)                     vancomycin (VANCOCIN) 1,250 mg in 0.9% NaCl 250 mL intermittent infusion (mg) 1,250 mg New Bag 25 1105                    Nephrotoxins and other renal medications (From now, onward)      Start     Dose/Rate Route Frequency Ordered Stop    25 1100  vancomycin (VANCOCIN) 1,250 mg in 0.9% NaCl 250 mL intermittent infusion         1,250 mg  over 90 Minutes Intravenous EVERY 24 HOURS 25 2207              Contrast Orders - past 72 hours (72h ago, onward)      None            [employer] Prediction of Planned Initial Vancomycin Regimen  Loading dose: N/A  Regimen: 1250 mg IV every 24 hours.  Start time: 11:05 on 2025  Exposure target: AUC24 (range) 400-600 mg/L.hr   AUC24,ss: 580 mg/L.hr  Probability of AUC24 > 400: 85 %  Ctrough,ss: 18.6 mg/L  Probability of Ctrough,ss > 20: 44 %  Probability of nephrotoxicity (Lodise GARY ): 15 %          Plan:  Start vancomycin  1250 mg IV q24h.   Vancomycin monitoring method: AUC  Vancomycin therapeutic monitoring goal: 400-600 mg*h/L  Pharmacy will check vancomycin levels as appropriate in 1-3 Days.    Serum creatinine levels will be ordered daily for the first week of therapy and at least twice weekly for subsequent weeks.      Brenda Junior RP

## 2025-07-02 NOTE — PLAN OF CARE
Goal Outcome Evaluation:      Plan of Care Reviewed With: patient    Overall Patient Progress: no changeOverall Patient Progress: no change  Date/Time 7/1 moises    Trauma/Ortho/Medical (Choose one) ortho    Diagnosis:dry gangrene left foot with possible osteomyelitis, occlusion left leg vessels  Mental Status:forgetful  Activity/dangle up with 1/gait belt with pivot transfer to toilt  Diet:regular- NPO after mn  Pain:denies  Boyd/Voiding:attends- incontinent but can also call appropriately  Tele/Restraints/Iso:no  02/LDA:saline lock- IVF right AC- beeps if flexes arm  D/C Date:?  Other Info:turns self in bed. MRI checklist done and faxed to MRI. Anticipating it to be done tonight. Declines meds at this time .

## 2025-07-02 NOTE — PROGRESS NOTES
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 Unclear whether or not there is osteomyelitis present.  He has received the majority of his care at the [facility], where he reports his aortic any femoral bypass was done 10 to 20 years ago.  He will require revascularization of his left lower extremity in order to heal this wound.    - Will try to add the patient on for a fem-fem bypass today. Please keep NPO.  - Hold Eliqius.  - Follow-up pre-op EKG  - Follow-up MRI L foot to evaluate for osteomyelitis  - Continue hydration for mild TERI  - Further care per primary team           HPI: 89M  with most of his care at the [facility] who was transferred for L foot wound. He is not sure how long it has been there but he says it has been at least 2 weeks. Reports he had his aortic surgery 10-20 years ago.     PMH: CAD s/p CABG, BPH, PAD, SCOT  PSH: CABG[facility] [place], BL fem-pop bypasses, h/o DVT (4/2025), possible robotic/lap surgery based on scars  SH: former smoker, denies EtOH/drugs  FH: unknown  Allergies: alendronate, simvastatin  Meds: Eliquis, ASA 81, atorvastatin 40, doxazosin, hydrochlorothiazide, lisinopril, metoprolol 25 BID, sildenafil    Exam:  Gen: NAD, cooperative, forgetful  CV: RRR by peripheral pulse  Resp: NLB on RA  Abd: Soft, NTND, non-peritonitic. W/h surgical scars. No hernias or pulsatile abdominal masses appreciated.   Groins: Soft BL, no hematomas.   Ext: W/h medial leg surgical scars on BLE from previous vein harvests. L 1st and 3rd toe dry gangrene. No R foot wounds.  Vasc:  - RLE: palpable fem, monophasic DP, weak monophasic PT  - LLE: non-palp fem, non-dop DP/PT    LABS:  WBC 8.0  Hgb 10.9  Cr 1.20 (H) - baseline is 0.9    IMAGING:  I personally reviewed the CTA from yesterday which demonstrates a  bifurcated aortic endograft without left contralateral limb extension, essentially functioning as an aorto uni femoral bypass.  There is no femorofemoral bypass present.  He has left iliac occlusion with occlusion of the left contralateral limb.  Severe calcific disease of the left CFA/profunda. The right limb is patent with flow into the bypass graft and 1VRO via the tibial artery. The infrapopliteal vessels bilaterally are severely diseased. Of note, there is also severe visceral artery ostial stenosis is noted as well, with patent renal arteries bilaterally.    MARISA on the left are 0.4 with toe pressure 0.00. Toe pressure on the right is 87.    XR L foot equivocal for osteomyelitis of the hallux.     DVT seen previously in April showed LLE DVT, but on most recent venous duplex US yesterday there was no DVT visualized. He has been on Eliquis.    STAFF: Patient examined this morning.  Right gangrenous changes with cellulitis to left foot particularly great toe.  Has been going approximate month.  Significant vascular history of an endovascular AAA repair many years ago at [facility].  Apparently the left limb was never functional.  Thus, he has no direct blood flow to the left leg which is a significant poor prognosis for healing.    Patient has had a previous coronary bypass graft and apparently has done well.  He also has a right femoral to popliteal suspected vein graft that is patent clinically and by CTA.    We do need to maximize the blood flow to the left leg.  Plan today a right to left femoral bypass graft.  Will use bovine artery due to the foot infection.  We do note the profundus femoral artery is patent.  But he definitely has distal disease by the CTA.  Improving blood supply to the profundus femoral will be helpful but unclear whether this will be adequate but options for more distal bypass particular the CT findings are very limited.    I did discuss the situation on the phone with his  sammy Duckworth who is requested by the patient to contact.  Discussed the procedure and the concerns we have.    Over 45 minutes with patient today.      Alejandro Delgadillo MD

## 2025-07-03 ENCOUNTER — APPOINTMENT (OUTPATIENT)
Dept: MRI IMAGING | Facility: CLINIC | Age: OVER 89
DRG: 253 | End: 2025-07-03
Attending: PODIATRIST
Payer: COMMERCIAL

## 2025-07-03 ENCOUNTER — APPOINTMENT (OUTPATIENT)
Dept: PHYSICAL THERAPY | Facility: CLINIC | Age: OVER 89
DRG: 253 | End: 2025-07-03
Attending: STUDENT IN AN ORGANIZED HEALTH CARE EDUCATION/TRAINING PROGRAM
Payer: COMMERCIAL

## 2025-07-03 VITALS
BODY MASS INDEX: 24.52 KG/M2 | RESPIRATION RATE: 16 BRPM | WEIGHT: 181 LBS | OXYGEN SATURATION: 97 % | TEMPERATURE: 97.4 F | SYSTOLIC BLOOD PRESSURE: 144 MMHG | HEIGHT: 72 IN | HEART RATE: 64 BPM | DIASTOLIC BLOOD PRESSURE: 49 MMHG

## 2025-07-03 LAB
ANION GAP SERPL CALCULATED.3IONS-SCNC: 7 MMOL/L (ref 7–15)
BACTERIA SPEC CULT: NORMAL
BACTERIA SPEC CULT: NORMAL
BUN SERPL-MCNC: 22.6 MG/DL (ref 8–23)
CALCIUM SERPL-MCNC: 8.7 MG/DL (ref 8.8–10.4)
CHLORIDE SERPL-SCNC: 103 MMOL/L (ref 98–107)
CREAT SERPL-MCNC: 1.03 MG/DL (ref 0.67–1.17)
EGFRCR SERPLBLD CKD-EPI 2021: 68 ML/MIN/1.73M2
ERYTHROCYTE [DISTWIDTH] IN BLOOD BY AUTOMATED COUNT: 15.2 % (ref 10–15)
GLUCOSE SERPL-MCNC: 127 MG/DL (ref 70–99)
GLUCOSE SERPL-MCNC: 127 MG/DL (ref 70–99)
HCO3 SERPL-SCNC: 26 MMOL/L (ref 22–29)
HCT VFR BLD AUTO: 28.4 % (ref 40–53)
HGB BLD-MCNC: 9 G/DL (ref 13.3–17.7)
MCH RBC QN AUTO: 26.6 PG (ref 26.5–33)
MCHC RBC AUTO-ENTMCNC: 31.7 G/DL (ref 31.5–36.5)
MCV RBC AUTO: 84 FL (ref 78–100)
PLATELET # BLD AUTO: 198 10E3/UL (ref 150–450)
POTASSIUM SERPL-SCNC: 4.3 MMOL/L (ref 3.4–5.3)
RBC # BLD AUTO: 3.38 10E6/UL (ref 4.4–5.9)
SODIUM SERPL-SCNC: 136 MMOL/L (ref 135–145)
WBC # BLD AUTO: 8.1 10E3/UL (ref 4–11)

## 2025-07-03 PROCEDURE — 250N000011 HC RX IP 250 OP 636: Performed by: INTERNAL MEDICINE

## 2025-07-03 PROCEDURE — 120N000001 HC R&B MED SURG/OB

## 2025-07-03 PROCEDURE — 73718 MRI LOWER EXTREMITY W/O DYE: CPT | Mod: LT

## 2025-07-03 PROCEDURE — 99232 SBSQ HOSP IP/OBS MODERATE 35: CPT | Performed by: INTERNAL MEDICINE

## 2025-07-03 PROCEDURE — 36415 COLL VENOUS BLD VENIPUNCTURE: CPT | Performed by: STUDENT IN AN ORGANIZED HEALTH CARE EDUCATION/TRAINING PROGRAM

## 2025-07-03 PROCEDURE — 99232 SBSQ HOSP IP/OBS MODERATE 35: CPT | Performed by: PODIATRIST

## 2025-07-03 PROCEDURE — 97110 THERAPEUTIC EXERCISES: CPT | Mod: GP | Performed by: PHYSICAL THERAPIST

## 2025-07-03 PROCEDURE — 250N000013 HC RX MED GY IP 250 OP 250 PS 637: Performed by: PODIATRIST

## 2025-07-03 PROCEDURE — 250N000011 HC RX IP 250 OP 636: Performed by: STUDENT IN AN ORGANIZED HEALTH CARE EDUCATION/TRAINING PROGRAM

## 2025-07-03 PROCEDURE — 97161 PT EVAL LOW COMPLEX 20 MIN: CPT | Mod: GP | Performed by: PHYSICAL THERAPIST

## 2025-07-03 PROCEDURE — 80048 BASIC METABOLIC PNL TOTAL CA: CPT | Performed by: INTERNAL MEDICINE

## 2025-07-03 PROCEDURE — 250N000013 HC RX MED GY IP 250 OP 250 PS 637: Performed by: INTERNAL MEDICINE

## 2025-07-03 PROCEDURE — 250N000013 HC RX MED GY IP 250 OP 250 PS 637: Performed by: STUDENT IN AN ORGANIZED HEALTH CARE EDUCATION/TRAINING PROGRAM

## 2025-07-03 PROCEDURE — 97116 GAIT TRAINING THERAPY: CPT | Mod: GP | Performed by: PHYSICAL THERAPIST

## 2025-07-03 PROCEDURE — 85018 HEMOGLOBIN: CPT | Performed by: STUDENT IN AN ORGANIZED HEALTH CARE EDUCATION/TRAINING PROGRAM

## 2025-07-03 PROCEDURE — 80048 BASIC METABOLIC PNL TOTAL CA: CPT | Performed by: STUDENT IN AN ORGANIZED HEALTH CARE EDUCATION/TRAINING PROGRAM

## 2025-07-03 RX ORDER — LORAZEPAM 0.5 MG/1
0.5 TABLET ORAL ONCE
Status: COMPLETED | OUTPATIENT
Start: 2025-07-03 | End: 2025-07-03

## 2025-07-03 RX ADMIN — ACETAMINOPHEN 650 MG: 325 TABLET ORAL at 04:25

## 2025-07-03 RX ADMIN — DOXAZOSIN 8 MG: 4 TABLET ORAL at 21:14

## 2025-07-03 RX ADMIN — POLYETHYLENE GLYCOL 3350 17 G: 17 POWDER, FOR SOLUTION ORAL at 08:28

## 2025-07-03 RX ADMIN — CEFEPIME 2 G: 2 INJECTION, POWDER, FOR SOLUTION INTRAVENOUS at 10:16

## 2025-07-03 RX ADMIN — OXYCODONE HYDROCHLORIDE 10 MG: 5 TABLET ORAL at 21:14

## 2025-07-03 RX ADMIN — METOPROLOL TARTRATE 25 MG: 25 TABLET, FILM COATED ORAL at 08:46

## 2025-07-03 RX ADMIN — ENOXAPARIN SODIUM 40 MG: 40 INJECTION SUBCUTANEOUS at 15:32

## 2025-07-03 RX ADMIN — METRONIDAZOLE 500 MG: 500 INJECTION, SOLUTION INTRAVENOUS at 15:35

## 2025-07-03 RX ADMIN — METRONIDAZOLE 500 MG: 500 INJECTION, SOLUTION INTRAVENOUS at 01:00

## 2025-07-03 RX ADMIN — ATORVASTATIN CALCIUM 40 MG: 40 TABLET, FILM COATED ORAL at 21:14

## 2025-07-03 RX ADMIN — METRONIDAZOLE 500 MG: 500 INJECTION, SOLUTION INTRAVENOUS at 08:28

## 2025-07-03 RX ADMIN — DOCUSATE SODIUM 50 MG AND SENNOSIDES 8.6 MG 1 TABLET: 8.6; 5 TABLET, FILM COATED ORAL at 08:28

## 2025-07-03 RX ADMIN — ACETAMINOPHEN 975 MG: 325 TABLET ORAL at 21:13

## 2025-07-03 RX ADMIN — DOCUSATE SODIUM 50 MG AND SENNOSIDES 8.6 MG 1 TABLET: 8.6; 5 TABLET, FILM COATED ORAL at 21:14

## 2025-07-03 RX ADMIN — LORAZEPAM 0.5 MG: 0.5 TABLET ORAL at 18:04

## 2025-07-03 RX ADMIN — ASPIRIN 81 MG: 81 TABLET, DELAYED RELEASE ORAL at 08:28

## 2025-07-03 RX ADMIN — METOPROLOL TARTRATE 25 MG: 25 TABLET, FILM COATED ORAL at 21:14

## 2025-07-03 RX ADMIN — CEFEPIME 2 G: 2 INJECTION, POWDER, FOR SOLUTION INTRAVENOUS at 23:49

## 2025-07-03 ASSESSMENT — ACTIVITIES OF DAILY LIVING (ADL)
ADLS_ACUITY_SCORE: 66
ADLS_ACUITY_SCORE: 66
ADLS_ACUITY_SCORE: 63
ADLS_ACUITY_SCORE: 66
ADLS_ACUITY_SCORE: 63
ADLS_ACUITY_SCORE: 66
ADLS_ACUITY_SCORE: 63
ADLS_ACUITY_SCORE: 66
ADLS_ACUITY_SCORE: 63
ADLS_ACUITY_SCORE: 64
ADLS_ACUITY_SCORE: 66
ADLS_ACUITY_SCORE: 63
ADLS_ACUITY_SCORE: 66
ADLS_ACUITY_SCORE: 63
ADLS_ACUITY_SCORE: 63
ADLS_ACUITY_SCORE: 66

## 2025-07-03 NOTE — OP NOTE
OPERATIVE NOTE    PROCEDURE DATE: 7/2/2025      PRE-OP DIAGNOSIS: Ischemic ulcerations left foot with occluded iliac arteries and severe infrainguinal PAD      POST-OP DIAGNOSES: Same      PROCEDURE PERFORMED: #1.  Right common femoral to left profundus femoral Artegraft bypass     #2.  Left profundus femoral endarterectomy      (D-10)      SURGEON:  Alejandro Delgadillo M.D.      ASSISTANT:  Nolvia Macias MD  ( vascular fellow)      ANESTHESIA:  General-endotracheal      PRE-OP MEDICATIONS: Ancef 2 g IV, scheduled IV antibiotics      INDICATIONS FOR PROCEDURE: 89-year-old patient has developed gangrenous changes to his left toes along with cellulitis.  Significant past vascular history of a aorto right iliac unilateral endovascular graft with occlusion of the left side.  Also a right femoral to popliteal vein bypass graft.  Both groins have had several vascular procedures.  We need to reestablish adequate inflow and we felt that a bypass from the right femoral artery to the left profundus femoral artery which was the only patent vessel in the left groin would be appropriate.  Informed consent was obtained from the patient.      DESCRIPTION OF PROCEDURE: Brought the op room placed supine.  Induced under general anesthesia and orally intubated without difficulty.  Pillow under his knees with calf pneumatic compression boots.  No Boyd.  The lower abdomen both groins were prepped and draped.  Timeout was called and the sites were identified.    VASCULAR EXPOSURE: Right femoral pulse was palpable as well as the bypass graft.  Curvilinear incision was made with a #10 blade scalpel.  With marked difficulty we dissected down to identify the common femoral artery above the femoral-popliteal bypass.  Significant scar tissue was noted on the lateral of the avoided any dissection to prevent injuring this graft that is remain patent.  We dissected under the ligament which was partially divided and were able to encircle the common  femoral artery at the level of the epigastric vessels where it was fairly soft.  There was much more significant plaque clinically and was CTA of the distal common femoral artery that we plan not to disturb due to the very difficult access exposure and multiple surgeries.    On the left a similar incision was made.  Dissection was carried down.  The common femoral and SFA were essentially occluded with plaque.  With some tedious dissection we identified a very large profundus femoral branch coming off the common femoral artery that was fairly soft.  We dissected distally until we were identified 2 large sided branches.  The branch going more laterally measured approximately 2.2 mm and was fairly soft.  The straighter branch measured 3 mm and was severely calcified.  Vesseloops were placed around all sites.    ARTEGRAFT: Due to the left foot infection we wanted to worried PTFE and decided used a bovine carotid artery for graft.  3000 units intravenous heparin were given.  After 4 minutes vascular clamp was applied at the proximal common femoral artery to clamp and distally.  11 blade was used to enter the artery.  There was significant posterior plaque distally but very little on the inflow.  Due to the inability to expose more distally we decided to leave this plaque alone since he had adequate blood flow to the right leg.  We selected a 6 mm Artegraft.  Trimmed obliquely.  The toe was anastomosed distally with the heel proximally.  This was performed with running 6-0 Prolene suture using some Artegraft pledgets.  Upon completion the anastomosis at least of the clamps with an excellent pulse and good hemostasis at the suture line.  It was a small amount of bleeding on the right lateral wall of the common femoral artery from the dissection and this was reefed was a mattress 6-0 Prolene suture.  We still had a good pulse in the right leg bypass graft.    We then created a subcutaneous tunnel from the left of the  right.  The Artegraft was brought through this in a very gentle curve and did straighten out nicely.  We excised some of the ligament on both sides of the graft would have a gentle curve.  Vascular clamp was applied on the left side.    ENDARTERECTOMY: Vessels were then tight around the left profundus branches.  #11 blade was used to enter the main branch of the profundus.  De La Torre scissors extended this down in the straight branch.  There was significant plaque in the straight Patchin we did a endarterectomy with good backbleeding that we could not define the endpoint.  A much better endarterectomy was performed of the lateral branch with a good endpoint backbleeding.  Widely patent lumen was now noted.  The left limb of the Artegraft was trimmed of likely measuring approximately 2.5 cm in length.  This is sewn to the arteriotomy on the profundus with the heel being on the main branch and the toe slightly extending onto the straight branch again with running 6-0 Prolene.    With release of the Vesseloops there was some bleeding from the posterior wall of the main strait branch due to the endarterectomy and this was repaired with minimal luminal narrowing with 2 mattress 7-0 Prolene sutures.  Now with release of the clamp on the Artegraft we had a strong pulse and excellent hemostasis.  A very gentle curve was noted.    Small amount of Surgicel was placed on the right side along the dissection of the common femoral artery for good hemostasis.  Heparin was not reversed.  The groins were closed in layers with interrupted and running 2-0 and 3-0 Vicryl suture.  Skin was closed with 4-0 Monocryl in subcu fashion.  Wounds were infiltrated with 0.5% Marcaine for postop analgesia.    Surgical adhesive was applied to both groin sites followed by Tegaderm dressings.    Patient tolerated procedure well.  Needle and sponge counts correct x 2.      EBL: 25 mL       COMPLICATIONS: None      OPERATIVE FINDINGS: Very difficult  dissection due to the multiple surgeries particular in the right.  There was still extensive plaque distally in the common femoral artery but adequate blood flow to the right leg and this was not disturbed for reasons described above.  Only runoff in the left is a profundus femoral artery.  Patient not a candidate for any type of distal bypass and hopefully this blood flow will be adequate for healing.    Procedure was much more difficult and lasted 2 and half hours due to significant scar tissue and multiple surgeries.      Alejandro Delgadillo MD

## 2025-07-03 NOTE — ANESTHESIA CARE TRANSFER NOTE
Patient: Angelica Maharaj    Procedure: Procedure(s):  RIGHT COMMON FEMORAL TO LEFT PROFUNDUS FEMORAL BYPASS USING 6MMX 31MM ARTEGRAFT COLLAGEN VASCULAR GRAFT; LEFT PROFUNDUS FEMORAL ENDARTERECTOMY       Diagnosis: PAD (peripheral artery disease) [I73.9]  Toe ulcer, left, with unspecified severity (H) [L97.529]  Diagnosis Additional Information: No value filed.    Anesthesia Type:   General     Note:    Oropharynx: oropharynx clear of all foreign objects and spontaneously breathing  Level of Consciousness: awake  Oxygen Supplementation: face mask  Level of Supplemental Oxygen (L/min / FiO2): 6  Independent Airway: airway patency satisfactory and stable    Vital Signs Stable: post-procedure vital signs reviewed and stable  Report to RN Given: handoff report given  Patient transferred to: PACU    Handoff Report: Identifed the Patient, Identified the Reponsible Provider, Reviewed the pertinent medical history, Discussed the surgical course, Reviewed Intra-OP anesthesia mangement and issues during anesthesia, Set expectations for post-procedure period and Allowed opportunity for questions and acknowledgement of understanding      Vitals:  Vitals Value Taken Time   /52 07/02/25 19:33   Temp 30.7  C (87.26  F) 07/02/25 19:43   Pulse 62 07/02/25 19:44   Resp 12 07/02/25 19:44   SpO2 98 % 07/02/25 19:44       Electronically Signed By: STEPHEN Chamberlain CRNA  July 2, 2025  7:45 PM

## 2025-07-03 NOTE — PLAN OF CARE
Goal Outcome Evaluation:         Shift: [id number]    Summary: POD #1 from Right common femoral to left profundus femoral Artegraft bypass & left profundus femoral endarterectomy  Cognition: A&O x3.Dis to time. Fluctuates     Neuro- CMS intact. Doppler use for pedal pulses  Most Recent Vitals- Temp: 98.6  F (37  C) Temp src: Temporal BP: (!) 113/39 Pulse: 63   Resp: 16 SpO2: 97 % O2 Device: None (Room air)   Tele/Cardiac- n/a  Resp- LS-diminished  Activity- Ax1 BGW  Pain- Denies  Drips- PIV SL  Drains/Tubes- N/A  Skin- See chart  GI/- Cont B/B  Aggression Color- Green  Plan- Pending MRI  Misc- Podiatry, vascular surgery, ID, hospitalist following.     Jose Guadalupe Walton RN

## 2025-07-03 NOTE — PLAN OF CARE
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  Right common femoral to left profundus femoral Artegraft bypass   POD: 1  Mental Status:A&OX3, disoriented to time  Activity/dangle: 1 and walker   Diet: regular  Pain: scheduled tylenol  Boyd/Voiding: BR  02/LDA: DI JARA   D/C Date:TBD  Other Info: Left 3rd and 5th toe wound. Left and Right groin sites are derma bond and covered with clear dressings, scant drainage underneath. Abdominal femoral pulse palpable +2. Ativan given for MRI.

## 2025-07-03 NOTE — PROGRESS NOTES
07/03/25 1100   Appointment Info   Signing Clinician's Name / Credentials (PT) Swetha Cross PT   Living Environment   People in Home alone   Current Living Arrangements apartment   Home Accessibility no concerns   Living Environment Comments one level home\rambler 2 story pt keeps to main level   Self-Care   Usual Activity Tolerance moderate   Current Activity Tolerance moderate   Equipment Currently Used at Home walker, rolling;grab bar, tub/shower   Fall history within last six months yes   Number of times patient has fallen within last six months 2   Activity/Exercise/Self-Care Comment Pt reports indep w ADLS and self cares; req AD for mobility   General Information   Onset of Illness/Injury or Date of Surgery 07/03/25   Pertinent History of Current Problem (include personal factors and/or comorbidities that impact the POC) POD #1   Right common femoral to left profundus femoral and Left profundus femoral endarterectomy  89-year-old patient has developed gangrenous changes to his left toes along with cellulitis.  Significant past vascular history of a aorto right iliac unilateral endovascular graft with occlusion of the left side.  Also a right femoral to popliteal vein bypass graft.  Both groins have had several vascular procedures.  We need to reestablish adequate inflow and we felt that a bypass from the right femoral artery to the left profundus femoral artery which was the only patent vessel in the left groin would be appropriate.   Existing Precautions/Restrictions fall;weight bearing   Weight-Bearing Status - LLE weight-bearing as tolerated  (thru heel)   Cognition   Affect/Mental Status (Cognition) confused   Orientation Status (Cognition) oriented x 3   Follows Commands (Cognition) follows one-step commands;75-90% accuracy;repetition of directions required   Behavioral Issues inappropriate language   Safety Deficit (Cognition) ability to follow commands;at risk behavior observed   Cognitive  Status Comments Recent SLUMS of 8 per review of discharge summary 4/2025. No formal diagnosis of dementia per niece.   Integumentary/Edema   Integumentary/Edema Comments gangrenous lesion to the distal hallux and the third toe   Posture    Posture Forward head position;Protracted shoulders   Range of Motion (ROM)   Range of Motion ROM is WFL   Strength (Manual Muscle Testing)   Strength (Manual Muscle Testing) Deficits observed during functional mobility   Strength Comments Not formally assessed, noted generalized weakness with functional mobility.   Bed Mobility   Comment, (Bed Mobility) SBA pt using bed rail for moderate assist, able to move LE off on his own   Transfers   Comment, (Transfers) SBA sit to stand w WW   Gait/Stairs (Locomotion)   Catron Level (Gait) contact guard   Assistive Device (Gait) walker, front-wheeled   Distance in Feet (Gait) 10   Pattern (Gait) step-through   Deviations/Abnormal Patterns (Gait) cesar decreased;gait speed decreased;stride length decreased   Maintains Weight-bearing Status (Gait) able to maintain   Balance   Balance Comments good in sitting; able to statically stand w/o support; AD for walking at baseline   Clinical Impression   Criteria for Skilled Therapeutic Intervention Yes, treatment indicated   PT Diagnosis (PT) Difficulty walking   Influenced by the following impairments dec strength impaired gait dec indep transfers pain   Functional limitations due to impairments impaired mobility   Clinical Presentation (PT Evaluation Complexity) evolving   Clinical Presentation Rationale clincial judgement   Clinical Decision Making (Complexity) low complexity   Planned Therapy Interventions (PT) bed mobility training;gait training;strengthening;transfer training;progressive activity/exercise   Risk & Benefits of therapy have been explained evaluation/treatment results reviewed;care plan/treatment goals reviewed;risks/benefits reviewed   PT Discharge Planning   PT  "Discharge Recommendation (DC Rec) home with assist;home with home care physical therapy   PT Rationale for DC Rec Pt mobilizing well we FWW, uses 4WW at home will need to trial here, pt initially reporting he lives with his annemarie then states she was coming from [place], pt admitting he lives alone, uses cabs for transportation, sharing his \"rests alot\", questionable historian, chart from 4/2025 [facility] of 8, will have OT confirm, pt mobilizing w CGA/SBA x 1, decreased safety with WW observed confirming pt is a fall risk, will benefit from PT during stay, CC to confirm annemarie is coming to stay with patient upon discharge, recommend HHC with home PT, OT, nursing and SW for home safety, mobility, wound cares etc.       "

## 2025-07-03 NOTE — PROGRESS NOTES
Vascular Surgery Progress Note:  POD#1    S: Comfortable through the night.  No complaints.    O:   Vitals:  BP  Min: 113/39  Max: 161/56  Temp  Av.2  F (36.8  C)  Min: 97.6  F (36.4  C)  Max: 98.6  F (37  C)  Pulse  Av.8  Min: 55  Max: 75  I/O last 3 completed shifts:  In: 1250 [P.O.:250; I.V.:1000]  Out: 25 [Blood:25]    Physical Exam: Lying in bed.  Conversant.   Right and left groin sites healing well   Palpable pulse in femoral-femoral graft   Left foot is warm and pink.  Monophasic Doppler signals noted.      Assessment/Plan: Doing well following right common femoral to left profundus femoral Artegraft bypass.  Endarterectomy was necessary on profundus femoral with very limited outflow.  Blood flow is improved but there is nothing further we can do to improve this.      I discussed this with his niece on the phone last evening who lives in [place] and also with patient.  Further treatment per podiatry.      Wm. Elie MD

## 2025-07-03 NOTE — PROGRESS NOTES
Podiatry / Foot and Ankle Surgery Progress Note    July 3, 2025    Subject: Patient was seen at bedside.  Was unable to get his MRI as he could not sit still.    Objective:  Vitals: BP (!) 127/39 (BP Location: Left arm)   Pulse 61   Temp 97.6  F (36.4  C) (Oral)   Resp 16   Ht 1.829 m (6')   Wt 82.1 kg (181 lb)   SpO2 98%   BMI 24.55 kg/m    BMI= Body mass index is 24.55 kg/m .    General:  Patient is alert and orientated.  NAD.    Dermatologic: Left distal hallux gangrene: dry and well adhered. Minimal erythema. No drainage.   Third digit distal toe eschar. Small amount of drainage at nailbed.      Vascular: Dorsalis pedis and posterior tibial pulses are difficult to palpate.     Neurologic: Lower extremity sensation is diminished, bilateral foot, to light touch.  No evidence of neurological-based weakness or contracture in the lower extremities.       Musculoskeletal: Patient is ambulatory without an assistive device or brace.  No gross foot or ankle deformity noted.       Imaging: left foot xray - I personally reviewed images. Soft tissue swelling and bones are demineralized. There is some cortical irregularity along the distal tuft of the distal phalanx great toe, equivocal for osteomyelitis. If there is ulceration in this area and further evaluation is needed, recommend MRI.     Hammertoe configurations. No evidence of an acute displaced fracture. Atherosclerotic vascular calcifications.    MRI left foot: Pending     Assessment:    1. Left foot hallux distal toe gangrene, third digit gangrenous lesion  2. Peripheral Arterial Disease --> possible bypass planned for today     Plan:    -Patient underwent vascular intervention yesterday with minimal blood flow improvement noted.     -Discussed with patient left foot and likely need for debridements and possible partial toe amputations. MRI is ordered and pending to evaluate further for osteomyelitis. Sites currently clean and dry at this time. Can be left  open to air.     - Reordered MRI with Ativan to see if this would help him lay flat longer to get images to assess for bone infection.     -Can be full WB. Will be WB to heel of LLE post op.     -Can keep the left foot open to air at this time.      -Will follow up with patient once MRI is complete to discus next steps.         Viki Phillips DPM, Podiatry/Foot and Ankle Surgery  8:04 AM

## 2025-07-03 NOTE — PROGRESS NOTES
[facility]  Infectious Disease Progress Note          Assessment and Plan:   Date of Admission:  7/1/2025  Date of Consult (When I saw the patient): 07/02/25     Assessment & Plan  Angelica Maharaj is a 89 year old male who was admitted on 7/1/2025.      Impression: 1 89-year-old male with known peripheral vascular disease, prior revascularization now with worsening ischemia to his foot and dry gangrene, possible underlying osteomyelitis relatively minor cellulitis and no systemic toxicity  2 peripheral vascular disease including probable active occlusion  3 mild chronic kidney disease  4 chronic cognitive impairment     REC 1 unlikely Vanco needed MRSA nares negative DC Vanco, getting cefepime and Flagyl but likely key here is revascularization possibly nonviable foot areas with debridement etc., amount and type of antibiotics to be determined but probably not the key to this problem  2 feels okay postop, vascular status improved, podiatry plan to follow, antibiotic plan will be based on those findings           Interval History:     no new complaints and doing well; no cp, sob, n/v/d, or abd pain.  Feels okay postop minor foot pain.              Medications:     Current Facility-Administered Medications   Medication Dose Route Frequency Provider Last Rate Last Admin    acetaminophen (TYLENOL) tablet 975 mg  975 mg Oral TID Lyndsay Macias MD   975 mg at 07/02/25 2213    [Held by provider] apixaban ANTICOAGULANT (ELIQUIS) tablet 5 mg  5 mg Oral BID Markos Murray MD        aspirin EC tablet 81 mg  81 mg Oral Daily Markos Murray MD   81 mg at 07/03/25 0828    atorvastatin (LIPITOR) tablet 40 mg  40 mg Oral At Bedtime Markos Murray MD   40 mg at 07/02/25 2213    ceFEPIme (MAXIPIME) 2 g vial to attach to  mL bag for ADULTS or NS 50 mL bag for PEDS  2 g Intravenous Q12H Markos Murray  mL/hr at 07/02/25 2227 2 g at 07/03/25 1016    doxazosin  (CARDURA) tablet 8 mg  8 mg Oral At Bedtime Markos Murray MD   8 mg at 07/02/25 2213    enoxaparin ANTICOAGULANT (LOVENOX) injection 40 mg  40 mg Subcutaneous Q24H Lyndsay Macias MD        [Held by provider] hydroCHLOROthiazide tablet 12.5 mg  12.5 mg Oral Daily Markos Murray MD        [Held by provider] lisinopril (ZESTRIL) tablet 15 mg  15 mg Oral Daily Markos Murray MD        metoprolol tartrate (LOPRESSOR) tablet 25 mg  25 mg Oral BID Markos Murray MD   25 mg at 07/03/25 0846    metroNIDAZOLE (FLAGYL) infusion 500 mg  500 mg Intravenous Q8H Markos Murray MD   500 mg at 07/03/25 0828    polyethylene glycol (MIRALAX) Packet 17 g  17 g Oral Daily Lyndsay Macias MD        senna-docusate (SENOKOT-S/PERICOLACE) 8.6-50 MG per tablet 1 tablet  1 tablet Oral BID Lyndsay Macias MD   1 tablet at 07/03/25 0828    sodium chloride (PF) 0.9% PF flush 3 mL  3 mL Intracatheter Q8H IGOR Lyndsay Macias MD        sodium chloride (PF) 0.9% PF flush 3 mL  3 mL Intracatheter Q8H Markos Murray MD   3 mL at 07/01/25 2132                  Physical Exam:   Blood pressure (!) 127/39, pulse 61, temperature 97.6  F (36.4  C), temperature source Oral, resp. rate 16, height 1.829 m (6'), weight 82.1 kg (181 lb), SpO2 98%.  Wt Readings from Last 2 Encounters:   07/03/25 82.1 kg (181 lb)   07/01/25 85.7 kg (189 lb)     Vital Signs with Ranges  Temp:  [97.6  F (36.4  C)-98.6  F (37  C)] 97.6  F (36.4  C)  Pulse:  [55-64] 61  Resp:  [11-20] 16  BP: (113-157)/(39-68) 127/39  SpO2:  [94 %-100 %] 98 %    Constitutional: Awake, alert, cooperative, no apparent distress     Lungs: Clear to auscultation bilaterally, no crackles or wheezing   Cardiovascular: Regular rate and rhythm, normal S1 and S2, and no murmur noted   Abdomen: Normal bowel sounds, soft, non-distended, non-tender   Skin: No rashes, no cyanosis, no edema   Other: Foot about the same          Data:   All  "microbiology laboratory data reviewed.  Recent Labs   Lab Test 07/03/25  0911 07/02/25  0757 07/01/25  1006   WBC 8.1 7.2 8.0   HGB 9.0* 10.1* 10.9*   HCT 28.4* 32.3* 34.1*   MCV 84 84 84    215 233     Recent Labs   Lab Test 07/03/25  0911 07/02/25  0757 07/01/25  1006   CR 1.03 1.03 1.20*     No lab results found.  No lab results found.    Invalid input(s): \"UC\"     "

## 2025-07-03 NOTE — PLAN OF CARE
Goal Outcome Evaluation:      Plan of Care Reviewed With: patient    Overall Patient Progress: no changeOverall Patient Progress: no change         Date/Time 7/2/25 2479-9345    Trauma/Ortho/Medical (Choose one)  ortho/medical    Diagnosis: L great toe gangrene. RIGHT COMMON FEMORAL TO LEFT PROFUNDUS FEMORAL BYPASS USING 6MMX 31MM ARTEGRAFT COLLAGEN VASCULAR GRAFT; LEFT PROFUNDUS FEMORAL ENDARTERECTOMY on 7/2/25.     Mental Status:A&OX2-3-confused to time/place  Activity/dangle: 1 and walker to BSC  Diet: regular-tolerated fair with no nausea.  Pain: tylenol scheduled, denies pain  Boyd/Voiding: BSC x 2.  Tele/Restraints/Iso:None  02/LDA: RA, IV infusing-SL for MRI  D/C Date:TBD  Other Info: Left 3rd and 5th toe wound/black. Left and Right groin sites are derma bond and covered with clear dressings, scant drainage underneath. Pulses +1 per doppler abdominal femoral pulse is +2 per palpation. Tried to send to MRI but patient wouldn't cooperate. Will retry tomorrow. Podiatry, vascular surgery, ID, hospitalist following.

## 2025-07-03 NOTE — ANESTHESIA POSTPROCEDURE EVALUATION
Patient: Angelica Maharaj    Procedure: Procedure(s):  RIGHT COMMON FEMORAL TO LEFT PROFUNDUS FEMORAL BYPASS USING 6MMX 31MM ARTEGRAFT COLLAGEN VASCULAR GRAFT; LEFT PROFUNDUS FEMORAL ENDARTERECTOMY       Anesthesia Type:  General    Note:  Disposition: Inpatient   Postop Pain Control: Uneventful            Sign Out: Well controlled pain   PONV: No   Neuro/Psych: Uneventful            Sign Out: Acceptable/Baseline neuro status   Airway/Respiratory: Uneventful            Sign Out: Acceptable/Baseline resp. status   CV/Hemodynamics: Uneventful            Sign Out: Acceptable CV status; No obvious hypovolemia; No obvious fluid overload   Other NRE: NONE   DID A NON-ROUTINE EVENT OCCUR? No       Last vitals:  Vitals Value Taken Time   /68 07/02/25 20:30   Temp 26.7  C (80.06  F) 07/02/25 20:07   Pulse 59 07/02/25 20:43   Resp 19 07/02/25 20:44   SpO2 97 % 07/02/25 20:43   Vitals shown include unfiled device data.    Electronically Signed By: Milind Olson MD  July 2, 2025  8:56 PM

## 2025-07-03 NOTE — PROGRESS NOTES
[facility]    Medicine Progress Note - Hospitalist Service    Date of Admission:  7/1/2025    Assessment & Plan   Angelica Maharaj is a 89 year old male with past medical history significant for peripheral artery disease s/p bilateral femoral to below-knee popliteal artery bypass, coronary artery disease s/p CABG, hx of DVT, cognitive impairment who presents with left foot pain, found to have dry gangrene and significant PAD, transferred to [facility] on 7/1/2025 for further evaluation and treatment.      Left foot cellulitis with dry gangrene and possible osteomyelitis   Peripheral artery disease s/p bilateral femoral to below-knee popliteal artery bypass  Status post Right common femoral to left profundus femoral Artegraft bypass: Per vascular surgery ,Endarterectomy was necessary on profundus femoral with very limited outflow. Blood flow is improved but there is nothing further they can do to improve this.     *Presents with progressive pain in left foot/toe   *CTA runoff with previously seen occlusions as noted in read, with partial obscured left femoral to below-knee popliteal artery bypass graft with apparent significant stenoses within the mid distal portions of the graft  *Left foot XR with cortical irregularity along the distal tuft of the distal phalanx of great toe, equivocal for osteomyelitis  *Case discussed with vascular surgery prior to transfer, recommended transfer for formal evaluation and discussion with patient/family regarding potential interventions      - Vascular surgery consulted  - Podiatry consulted  - Consulted ID as findings concerning for osteomyelitis, patient will most likely need midline and outpatient IV antibiotics setup at discharge so consulted SW as well  - Cefepime IV, metronidazole IV. Discontinue vancomycin given negative MRSA swab. Otherwise, will await ID recs. [id number]   - MRI left foot ordered , was unable to get , podiatry is awaiting for  esults   - Hold prior to admission apixaban pending surgical evaluations  - Continue prior to admission aspirin, atorvastatin   - Acetaminophen PO, oxycodone PO, hydromorphone IV PRN    Hypertension  - Continue prior to admission metoprolol   - Hold prior to admission lisinopril, hydrochlorothiazide pending surgical evaluation / possible OR    Hx of DVT  Diagnosed 4/19/2025. US negative for DVT in ED.   - Hold prior to admission apixaban as abvoe     Coronary artery disease s/p CABG  - Medication management as above     Chronic kidney disease, stage 2  Baseline creatinine 0.9-1. Creatinine mildly elevated at 1.20 on admission but now back to baseline.  - IVF overnight   - Avoid nephrotoxins  - BMP in AM  - Hold PTA lisinopril and hydrochlorothiazide as above     BPH  - Continue prior to admission doxazosin     Cognitive impairment  Recent SLUMS of 8 per review of discharge summary 4/2025. No formal diagnosis of dementia per niece.   - Re-orient as needed  - Maintain normal day/night, sleep wake cycles  - Minimize sedating/altering medications as able  - Treat separate conditions as detailed above/below     Chronic normocytic anemia  Hx of iron deficiency anemia  Hgb 10.9 g/dl, at recent baseline.   - Monitor CBC     Diet: Regular Diet Adult    DVT Prophylaxis: Pneumatic Compression Devices  Boyd Catheter: Not present  Lines: None     Cardiac Monitoring: None  Code Status: Full Code      Clinically Significant Risk Factors               # Hypoalbuminemia: Lowest albumin = 3.3 g/dL at 7/1/2025 10:06 AM, will monitor as appropriate    # Coagulation Defect: INR = 1.60 (Ref range: 0.85 - 1.15) and/or PTT = 34 Seconds (Ref range: 22 - 38 Seconds), will monitor for bleeding    # Hypertension: Noted on problem list                # Financial/Environmental Concerns:           Social Drivers of Health    Tobacco Use: Medium Risk (7/2/2025)    Patient History     Smoking Tobacco Use: Former     Smokeless Tobacco Use: Never           Disposition Plan     Medically Ready for Discharge: Anticipated in 2-4 Days pending vascular surgery eval, possible surgery, likely home antibiotic setup       Kori Barksdale MD  Hospitalist Service  [facility]  Securely message with [employer] (more info)  Text page via [employer] Paging/Directory   ______________________________________________________________________    Interval History     Admitted for dry gangrene and underwent vascular procedure yesterday , denying any pain this morning , discussed with him regarding plan for MRI    Physical Exam   Vital Signs: Temp: 97.7  F (36.5  C) Temp src: Oral BP: 131/48 Pulse: 63   Resp: 16 SpO2: 98 % O2 Device: Nasal cannula Oxygen Delivery: 1 LPM  Weight: 180 lbs 15.96 oz    Constitutional: Awake, alert, cooperative, no apparent distress.    Pulmonary: No increased work of breathing, good air exchange, clear to auscultation bilaterally, no crackles or wheezing.  Cardiovascular: Regular rate and rhythm, normal S1 and S2, no S3 or S4. No murmurs, rubs, or gallops noted.  GI: Normal bowel sounds, soft, non-distended, non-tender.  No guarding or rebound.  Extremities: LLE non-palpable DP/PT pulses. Patient has motor function and sensation to the left foot. There is dry gangrene of the 1st and 3rd toes with associated tenderness during exam.   Neuro: A&Ox4. Conversant. Responds appropriately in conversation. Moves all extremities with no focal deficit identified.     Medical Decision Making       42 MINUTES SPENT BY ME on the date of service doing chart review, history, exam, documentation & further activities per the note.      Data   ------------------------- PAST 24 HR DATA REVIEWED -----------------------------------------------    I have personally reviewed the following data over the past 24 hrs:    7.2  \   10.1 (L)   / 215     141 107 25.4 (H) /  104 (H)   4.3 24 1.03 \       Imaging results reviewed over the past 24 hrs:   No results found for  this or any previous visit (from the past 24 hours).

## 2025-07-04 ENCOUNTER — APPOINTMENT (OUTPATIENT)
Dept: OCCUPATIONAL THERAPY | Facility: CLINIC | Age: OVER 89
DRG: 253 | End: 2025-07-04
Attending: STUDENT IN AN ORGANIZED HEALTH CARE EDUCATION/TRAINING PROGRAM
Payer: COMMERCIAL

## 2025-07-04 LAB
ATRIAL RATE - MUSE: 75 BPM
ATRIAL RATE - MUSE: NORMAL BPM
CREAT SERPL-MCNC: 1.06 MG/DL (ref 0.67–1.17)
DIASTOLIC BLOOD PRESSURE - MUSE: NORMAL MMHG
DIASTOLIC BLOOD PRESSURE - MUSE: NORMAL MMHG
EGFRCR SERPLBLD CKD-EPI 2021: 66 ML/MIN/1.73M2
GLUCOSE BLDC GLUCOMTR-MCNC: 92 MG/DL (ref 70–99)
INTERPRETATION ECG - MUSE: NORMAL
INTERPRETATION ECG - MUSE: NORMAL
P AXIS - MUSE: 93 DEGREES
P AXIS - MUSE: NORMAL DEGREES
POTASSIUM SERPL-SCNC: 4.2 MMOL/L (ref 3.4–5.3)
PR INTERVAL - MUSE: 218 MS
PR INTERVAL - MUSE: NORMAL MS
QRS DURATION - MUSE: 128 MS
QRS DURATION - MUSE: 130 MS
QT - MUSE: 414 MS
QT - MUSE: 440 MS
QTC - MUSE: 462 MS
QTC - MUSE: 464 MS
R AXIS - MUSE: -42 DEGREES
R AXIS - MUSE: -54 DEGREES
SODIUM SERPL-SCNC: 140 MMOL/L (ref 135–145)
SYSTOLIC BLOOD PRESSURE - MUSE: NORMAL MMHG
SYSTOLIC BLOOD PRESSURE - MUSE: NORMAL MMHG
T AXIS - MUSE: -12 DEGREES
T AXIS - MUSE: 5 DEGREES
VENTRICULAR RATE- MUSE: 67 BPM
VENTRICULAR RATE- MUSE: 75 BPM

## 2025-07-04 PROCEDURE — 82565 ASSAY OF CREATININE: CPT | Performed by: INTERNAL MEDICINE

## 2025-07-04 PROCEDURE — 250N000013 HC RX MED GY IP 250 OP 250 PS 637: Performed by: INTERNAL MEDICINE

## 2025-07-04 PROCEDURE — 36415 COLL VENOUS BLD VENIPUNCTURE: CPT | Performed by: INTERNAL MEDICINE

## 2025-07-04 PROCEDURE — 97166 OT EVAL MOD COMPLEX 45 MIN: CPT | Mod: GO | Performed by: OCCUPATIONAL THERAPIST

## 2025-07-04 PROCEDURE — 120N000001 HC R&B MED SURG/OB

## 2025-07-04 PROCEDURE — 99232 SBSQ HOSP IP/OBS MODERATE 35: CPT | Performed by: INTERNAL MEDICINE

## 2025-07-04 PROCEDURE — 84132 ASSAY OF SERUM POTASSIUM: CPT | Performed by: STUDENT IN AN ORGANIZED HEALTH CARE EDUCATION/TRAINING PROGRAM

## 2025-07-04 PROCEDURE — 84295 ASSAY OF SERUM SODIUM: CPT | Performed by: INTERNAL MEDICINE

## 2025-07-04 PROCEDURE — 97535 SELF CARE MNGMENT TRAINING: CPT | Mod: GO | Performed by: OCCUPATIONAL THERAPIST

## 2025-07-04 PROCEDURE — 250N000011 HC RX IP 250 OP 636: Performed by: INTERNAL MEDICINE

## 2025-07-04 PROCEDURE — 99232 SBSQ HOSP IP/OBS MODERATE 35: CPT | Performed by: PODIATRIST

## 2025-07-04 PROCEDURE — 250N000013 HC RX MED GY IP 250 OP 250 PS 637: Performed by: STUDENT IN AN ORGANIZED HEALTH CARE EDUCATION/TRAINING PROGRAM

## 2025-07-04 RX ORDER — SODIUM CHLORIDE 9 MG/ML
INJECTION, SOLUTION INTRAVENOUS CONTINUOUS
Status: DISCONTINUED | OUTPATIENT
Start: 2025-07-05 | End: 2025-07-05

## 2025-07-04 RX ADMIN — CEFEPIME 2 G: 2 INJECTION, POWDER, FOR SOLUTION INTRAVENOUS at 10:27

## 2025-07-04 RX ADMIN — ACETAMINOPHEN 975 MG: 325 TABLET ORAL at 16:26

## 2025-07-04 RX ADMIN — DOCUSATE SODIUM 50 MG AND SENNOSIDES 8.6 MG 1 TABLET: 8.6; 5 TABLET, FILM COATED ORAL at 22:14

## 2025-07-04 RX ADMIN — METRONIDAZOLE 500 MG: 500 INJECTION, SOLUTION INTRAVENOUS at 00:45

## 2025-07-04 RX ADMIN — ACETAMINOPHEN 975 MG: 325 TABLET ORAL at 22:14

## 2025-07-04 RX ADMIN — ASPIRIN 81 MG: 81 TABLET, DELAYED RELEASE ORAL at 08:18

## 2025-07-04 RX ADMIN — POLYETHYLENE GLYCOL 3350 17 G: 17 POWDER, FOR SOLUTION ORAL at 08:18

## 2025-07-04 RX ADMIN — METOPROLOL TARTRATE 25 MG: 25 TABLET, FILM COATED ORAL at 22:14

## 2025-07-04 RX ADMIN — DOCUSATE SODIUM 50 MG AND SENNOSIDES 8.6 MG 1 TABLET: 8.6; 5 TABLET, FILM COATED ORAL at 08:16

## 2025-07-04 RX ADMIN — ACETAMINOPHEN 975 MG: 325 TABLET ORAL at 08:17

## 2025-07-04 RX ADMIN — METOPROLOL TARTRATE 25 MG: 25 TABLET, FILM COATED ORAL at 08:17

## 2025-07-04 RX ADMIN — ATORVASTATIN CALCIUM 40 MG: 40 TABLET, FILM COATED ORAL at 22:14

## 2025-07-04 RX ADMIN — DOXAZOSIN 8 MG: 4 TABLET ORAL at 22:14

## 2025-07-04 RX ADMIN — CEFEPIME 2 G: 2 INJECTION, POWDER, FOR SOLUTION INTRAVENOUS at 22:15

## 2025-07-04 RX ADMIN — METRONIDAZOLE 500 MG: 500 INJECTION, SOLUTION INTRAVENOUS at 16:26

## 2025-07-04 RX ADMIN — METRONIDAZOLE 500 MG: 500 INJECTION, SOLUTION INTRAVENOUS at 08:18

## 2025-07-04 ASSESSMENT — ACTIVITIES OF DAILY LIVING (ADL)
ADLS_ACUITY_SCORE: 66
ADLS_ACUITY_SCORE: 70
ADLS_ACUITY_SCORE: 70
ADLS_ACUITY_SCORE: 66
ADLS_ACUITY_SCORE: 66
ADLS_ACUITY_SCORE: 70
ADLS_ACUITY_SCORE: 66
ADLS_ACUITY_SCORE: 66
PREVIOUS_RESPONSIBILITIES: MEAL PREP;HOUSEKEEPING;LAUNDRY;SHOPPING;MEDICATION MANAGEMENT;FINANCES;DRIVING
ADLS_ACUITY_SCORE: 70
ADLS_ACUITY_SCORE: 66
ADLS_ACUITY_SCORE: 70
ADLS_ACUITY_SCORE: 66
ADLS_ACUITY_SCORE: 70
ADLS_ACUITY_SCORE: 66
ADLS_ACUITY_SCORE: 70
ADLS_ACUITY_SCORE: 70

## 2025-07-04 NOTE — PROGRESS NOTES
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  Right common femoral to left profundus femoral Artegraft bypass   POD: 2    Orientation: A/O x2 disoriented to time & place. Oxy given    Vitals/Tele: VSS on RA    IV Access/drains: PIV SL    Diet: Regular    Mobility: A x1 w/ walker, up to bathroom     GI/: Continent of B/B, x1 BM this shift    Wound/Skin: Groin area dressing CDI, blanchable redness to coccyx. R ankle pulses, use droplet for pulses, palpate abd. for femoral pulses.     Consults: Vascular/Podiatry     Discharge Plan: TBD    ,Other Info: MRI still pending d/t x2 attempted incomplete MRI.  See Flow sheets for assessment

## 2025-07-04 NOTE — PROGRESS NOTES
[facility]    Medicine Progress Note - Hospitalist Service    Date of Admission:  7/1/2025    Assessment & Plan   Angelica Maharaj is a 89 year old male with past medical history significant for peripheral artery disease s/p bilateral femoral to below-knee popliteal artery bypass, coronary artery disease s/p CABG, hx of DVT, cognitive impairment who presents with left foot pain, found to have dry gangrene and significant PAD, transferred to [facility] on 7/1/2025 for further evaluation and treatment.      Left foot cellulitis with dry gangrene and possible osteomyelitis   Peripheral artery disease s/p bilateral femoral to below-knee popliteal artery bypass  Status post Right common femoral to left profundus femoral Artegraft bypass on 07/02/25 by ;    Presented with progressive pain in left foot/toe   CTA runoff with previously seen occlusions as noted in read, with partial obscured left femoral to below-knee popliteal artery bypass graft with apparent significant stenoses within the mid distal portions of the graft  Left foot XR with cortical irregularity along the distal tuft of the distal phalanx of great toe, equivocal for osteomyelitis  Case discussed with vascular surgery prior to transfer, recommended transfer for formal evaluation and discussion with patient/family regarding potential interventions    --Patient was admitted for further evaluation and management.  --Vascular surgery and podiatry consultation were requested.  --Patient underwent right common femoral to left profundus femoral artery graft bypass by  on 07/02/2025  --Patient was also evaluated by infectious disease due to the concern for osteomyelitis.  --Vancomycin was discontinued as MRSA nears PCR came back negative.  --Patient will continue to receive IV cefepime and Flagyl.  --Per Vascular surgery, wound has been improved as much as possible to the left leg.  --Patient underwent MRI of left foot  which was incomplete was able to show probable bone marrow edema in the tuft of the distal phalanx of the first toe and in the distal and middle phalanges of the third toe.  These findings are not definitely confirmed due to motion artifact.  --Hold prior to admission apixaban pending surgical evaluations.  --Will also hold subcu Lovenox for tentative surgery tomorrow morning.  --Continue PTA atorvastatin  --Hold PTA aspirin tomorrow.  --Continue pain medications with acetaminophen, oxycodone and IV hydromorphone which is available if pain is not controlled with p.o. medications.    Essential Hypertension  -- Continue PTA metoprolol   -- Hold PTA lisinopril, hydrochlorothiazide pending surgical evaluation tomorrow morning     Hx of DVT  Diagnosed 4/19/2025. US negative for DVT in ED.   -- Hold prior to admission apixaban as abvoe     Coronary artery disease s/p CABG  -- Medication management as above     Chronic kidney disease, stage 2  Baseline creatinine 0.9-1. Creatinine mildly elevated at 1.20 on admission but now back to baseline.  -- IVF overnight once patient is NPO  -- Avoid nephrotoxins  -- BMP in AM  -- Hold PTA lisinopril and hydrochlorothiazide as above     BPH  -- Continue PTA doxazosin     Cognitive impairment  Recent SLUMS of 8 per review of discharge summary 4/2025. No formal diagnosis of dementia per niece.   -- Re-orient as needed  -- Maintain normal day/night, sleep wake cycles  -- Minimize sedating/altering medications as able  -- Treat separate conditions as detailed above/below     Chronic normocytic anemia  Hx of iron deficiency anemia  Hgb 10.9 g/dl, at recent baseline.   -- Monitor CBC in AM    Diet: Regular Diet Adult  Room Service    DVT Prophylaxis: Pneumatic Compression Devices  Boyd Catheter: Not present  Lines: None     Cardiac Monitoring: None  Code Status: Full Code      Clinically Significant Risk Factors               # Hypoalbuminemia: Lowest albumin = 3.3 g/dL at 7/1/2025 10:06  AM, will monitor as appropriate       # Hypertension: Noted on problem list            # Overweight: Estimated body mass index is 26.37 kg/m  as calculated from the following:    Height as of this encounter: 1.829 m (6').    Weight as of this encounter: 88.2 kg (194 lb 7.1 oz)., PRESENT ON ADMISSION     # Financial/Environmental Concerns:           Social Drivers of Health    Tobacco Use: Medium Risk (7/2/2025)    Patient History     Smoking Tobacco Use: Former     Smokeless Tobacco Use: Never          Disposition Plan     Medically Ready for Discharge: Anticipated in 2-4 Days plan for going to the OR for podiatry intervention tomorrow 07/05/2025.  Discharge on Sunday if doing well and safe disposition plan is available.    Kori Barksdale MD  Hospitalist Service  [facility]  Securely message with [employer] (more info)  Text page via [facility] Paging/Directory   ______________________________________________________________________    Interval History     Patient was seen and examined, sitting in chair was also seen by Dr. Meeks from podiatry who have discussed with patient regarding recommendations for toe amputation to get part of the necrotic tissue in the bone infection, patient is in agreement with the plan, podiatry is also planning to have further discussion with Donita who is power of .      Physical Exam   Vital Signs: Temp: 98.2  F (36.8  C) Temp src: Oral BP: (!) 149/51 Pulse: 64   Resp: 18 SpO2: 95 % O2 Device: None (Room air)    Weight: 194 lbs 7.13 oz    Constitutional: Awake, alert, cooperative, no apparent distress.    Pulmonary: No increased work of breathing, good air exchange, clear to auscultation bilaterally, no crackles or wheezing.  Cardiovascular: Regular rate and rhythm, normal S1 and S2, no S3 or S4. No murmurs, rubs, or gallops noted.  GI: Normal bowel sounds, soft, non-distended, non-tender.  No guarding or rebound.  Extremities: LLE non-palpable DP/PT pulses.  Patient has motor function and sensation to the left foot. There is dry gangrene of the 1st and 3rd toes with associated tenderness during exam.   Neuro: A&Ox4. Conversant. Responds appropriately in conversation. Moves all extremities with no focal deficit identified.     Medical Decision Making       45 MINUTES SPENT BY ME on the date of service doing chart review, history, exam, documentation & further activities per the note.      Data   ------------------------- PAST 24 HR DATA REVIEWED -----------------------------------------------    I have personally reviewed the following data over the past 24 hrs:    N/A  \   N/A   / N/A     N/A N/A N/A /  92   4.2 N/A 1.06 \       Imaging results reviewed over the past 24 hrs:   Recent Results (from the past 24 hours)   MR Foot Left w/o Contrast    Narrative    EXAM: MR FOOT LEFT W/O CONTRAST  LOCATION: [facility]  DATE: 7/3/2025    INDICATION: Osteomyelitis concern.  COMPARISON: 7/1/2025.  TECHNIQUE: Unenhanced.    FINDINGS:     Incomplete exam. Only 2 sequences are available to review, in addition to the localizer images. This includes sagittal STIR and coronal T1 images. Both series have severe motion artifact, which precludes adequate assessment of the bones and bone marrow.   There is probably bone marrow edema in the distal phalanx and middle phalanx of the third toe (sagittal image 22). There is probably also bone marrow edema in the tuft of the distal phalanx (sagittal image 11). The severity of these findings is not   further delineated. The other bones and bone marrow or not adequately evaluated. Joint alignment appears anatomic without evidence of subluxation or dislocation. There is soft tissue edema throughout the foot.      Impression    IMPRESSION:  1.  Incomplete, nondiagnostic evaluation due to motion artifact and incomplete image acquisition.    2.  Probable bone marrow edema in the tuft of the distal phalanx of the first toe  and in the distal and middle phalanges of the third toe. These findings are not definitively confirmed due to motion artifact. The significance/severity of these findings   is uncertain due to the lack of study completion.

## 2025-07-04 NOTE — PROVIDER NOTIFICATION
Per Podiatry  Hold lovenox today 07/04 and tomorrow 07/05, order received for hold. Siomara plan of Care.

## 2025-07-04 NOTE — PROGRESS NOTES
VASCULAR SURGERY     Patient sitting up in chair.  Very comfortable.    Bilateral groin surgical sites healing well.    Palpable pulse with femorofemoral graft.  Monophasic left DP--improved    Palpable pulse in right leg bypass graft with strong monophasic ankle PT Doppler    IMPRESSION: We have improved blood flow is much as possible the left leg.  Appreciate Dr. Phillips's input.  Plan limited to amputation tomorrow and allowed inflow for the skin on the OR.       Alejandro Delgadillo MD

## 2025-07-04 NOTE — PROVIDER NOTIFICATION
Writer paged podiatry  regarding whether MRI needs to be completed or if what was done was sufficient. Per MD what was done was sufficient and will plan for surgery tomorrow 07/04. Continue plan of Care.

## 2025-07-04 NOTE — PLAN OF CARE
Goal Outcome Evaluation:       Pt A&Ox3, dis to time. VSS on RA. Reg diet. Up 1A GB&W, voiding in BR. Doppler pulses, CMS intact. Pain controlled w/ scheduled Tylenol. L&R groin site dressings intact. R PIV SL w/ int abx. Ortho, ID & Podiatry following. Plan for L great toe amp tomorrow 07/05, NPO@0000. Discharge pending medical stability, Continue plan of Care.

## 2025-07-04 NOTE — PROGRESS NOTES
Care Management Follow Up    Length of Stay (days): 3    Expected Discharge Date: 07/04/2025     Concerns to be Addressed: discharge planning     Patient plan of care discussed at interdisciplinary rounds: Yes    Anticipated Discharge Disposition:                Anticipated Discharge Services:    Anticipated Discharge DME:      Patient/family educated on Medicare website which has current facility and service quality ratings:    Education Provided on the Discharge Plan:    Patient/Family in Agreement with the Plan:      Referrals Placed by CM/SW:    Private pay costs discussed: Not applicable    Discussed  Partnership in Safe Discharge Planning  document with patient/family: No     Handoff Completed: No, handoff not indicated or clinically appropriate    Additional Information:  Podiatry surgeon now recommending toe amputation surgery and patient in agreement. Planned surgery for Saturday, 7-5 at 1100. CM team will continue following for safe discharge plan. Continue POC.    Next Steps: medical readiness      Frank Hannon RN  [facility]  Inpatient Care Management - FLOAT  CM RN Mobile: [phone] daily 7:30-4:00

## 2025-07-04 NOTE — PROGRESS NOTES
Patient was unable to complete imaging, needs lots of redirection, unable to follow commands to keep still. Sent through the images acquired. This is the departments second attempt to scan this patient.

## 2025-07-04 NOTE — PROGRESS NOTES
Podiatry / Foot and Ankle Surgery Progress Note    July 4, 2025    Subject: Patient was seen at bedside.  Notes he is doing okay.  Wondering when he can get out of the hospital.    Objective:  Vitals: BP (!) 149/51 (BP Location: Left arm)   Pulse 64   Temp 98.2  F (36.8  C) (Oral)   Resp 18   Ht 1.829 m (6')   Wt 88.2 kg (194 lb 7.1 oz)   SpO2 95%   BMI 26.37 kg/m    BMI= Body mass index is 26.37 kg/m .    WBC Count   Date Value Ref Range Status   07/03/2025 8.1 4.0 - 11.0 10e3/uL Final     General:  Patient is alert and orientated.  NAD.    Dermatologic: Left distal hallux gangrene: dry and well adhered. Minimal erythema. No drainage.   Third digit distal toe eschar. Small amount of drainage at nailbed.      Vascular: Dorsalis pedis and posterior tibial pulses are difficult to palpate.     Neurologic: Lower extremity sensation is diminished, bilateral foot, to light touch.  No evidence of neurological-based weakness or contracture in the lower extremities.       Musculoskeletal: Patient is ambulatory without an assistive device or brace.  No gross foot or ankle deformity noted.        Imaging: left foot xray - I personally reviewed images. Soft tissue swelling and bones are demineralized. There is some cortical irregularity along the distal tuft of the distal phalanx great toe, equivocal for osteomyelitis. If there is ulceration in this area and further evaluation is needed, recommend MRI.     Hammertoe configurations. No evidence of an acute displaced fracture. Atherosclerotic vascular calcifications.     MRI left foot:  Incomplete, nondiagnostic evaluation due to motion artifact and incomplete image acquisition.     2.  Probable bone marrow edema in the tuft of the distal phalanx of the first toe and in the distal and middle phalanges of the third toe. These findings are not definitively confirmed due to motion artifact. The significance/severity of these findings   is uncertain due to the lack of study  completion.     Assessment:    1. Left foot hallux distal toe gangrene, third digit gangrenous lesion  2. Peripheral Arterial Disease -->  3. 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     -reviewed MRI results.  There is concern for bone infection in the left great toe.      - Would recommend partial toe amputation to get part of the necrotic tissue in the bone infection.  Patient is in agreement.  Spoke with patient's power of  Donita who also is in agreement.    - Surgery scheduled for tomorrow at 11 AM.    - Patient to be n.p.o. after midnight.  Orders are placed.    - Please hold Lovenox doses tonight and tomorrow     -Can be full WB. Will be WB to heel of LLE post op.      -Can keep the left foot open to air at this time.      Viki Phillips DPM, Podiatry/Foot and Ankle Surgery  9:50 AM

## 2025-07-04 NOTE — PROGRESS NOTES
07/04/25 0800   Appointment Info   Signing Clinician's Name / Credentials (OT) Gabby López, OTR/L   Rehab Comments (OT) LLE heel WB   Living Environment   People in Home alone   Current Living Arrangements apartment   Home Accessibility no concerns   Living Environment Comments Pt reports he lives in an apartment, no stairs to enter. Pt has a tub shower and standard height toilet. Reports annemarie is coming to move in with him and is able to assist as needed   Self-Care   Usual Activity Tolerance moderate   Current Activity Tolerance moderate   Equipment Currently Used at Home walker, rolling;grab bar, tub/shower   Fall history within last six months yes   Number of times patient has fallen within last six months 2   Activity/Exercise/Self-Care Comment Pt reports mod I without mobility and ADLS with walker   Instrumental Activities of Daily Living (IADL)   Previous Responsibilities meal prep;housekeeping;laundry;shopping;medication management;finances;driving   IADL Comments Pt reports independence with IADLs a baseline, reports annemarie can assist with IADLS at discharge   General Information   Onset of Illness/Injury or Date of Surgery 07/01/25   Referring Physician Lyndsay Macias MD   Patient/Family Therapy Goal Statement (OT) Patient would like to discharge home   Additional Occupational Profile Info/Pertinent History of Current Problem 89 year old male with past medical history significant for peripheral artery disease s/p bilateral femoral to below-knee popliteal artery bypass, coronary artery disease s/p CABG, hx of DVT, cognitive impairment who presents with left foot pain, found to have dry gangrene and significant PAD, transferred to [facility] on 7/1/2025 for further evaluation and treatment.        Left foot cellulitis with dry gangrene and possible osteomyelitis   Peripheral artery disease s/p bilateral femoral to below-knee popliteal artery bypass  Status post Right common femoral to left  profundus femoral Artegraft bypass   Existing Precautions/Restrictions fall;weight bearing   Limitations/Impairments safety/cognitive   Cognitive Status Examination   Orientation Status person   Affect/Mental Status (Cognitive) confused   Follows Commands follows one-step commands;over 90% accuracy   Safety Deficit severe deficit   Memory Deficit severe deficit   Attention Deficit severe deficit   Cognitive Status Comments Per chart review, pt scored 8/30 on the SLUMS indicating severe cognitive impairment. Pt appears confused during session requiring reorientation and repetition of precautions and directions   Visual Perception   Visual Impairment/Limitations WFL   Sensory   Sensory Comments PT reports intact but appears questionable in LLE   Pain Assessment   Patient Currently in Pain Yes, see Vital Sign flowsheet   Posture   Posture forward head position;protracted shoulders   Range of Motion Comprehensive   Comment, General Range of Motion WFL   Strength Comprehensive (MMT)   Comment, General Manual Muscle Testing (MMT) Assessment Decline in global strength and tolerance for activity   Bed Mobility   Comment (Bed Mobility) SBA   Transfers   Transfer Comments Min assist   Balance   Balance Comments Impaired in standing   Activities of Daily Living   BADL Assessment/Intervention bathing;lower body dressing;grooming;toileting   Bathing Assessment/Intervention   Comment, (Bathing) Mod assist tub transfer per clinical reasoning   Lower Body Dressing Assessment/Training   Comment, (Lower Body Dressing) Max assist   Grooming Assessment/Training   Comment, (Grooming) Impaired tolerance for activity in standing   Toileting   Comment, (Toileting) Min assist transfer, max assist clothing management   Clinical Impression   Criteria for Skilled Therapeutic Interventions Met (OT) Yes, treatment indicated   OT Diagnosis Decline in ADL independence and safety   Influenced by the following impairments LLE gangrene   OT Problem  List-Impairments impacting ADL problems related to;activity tolerance impaired;balance;cognition;mobility;strength;pain;post-surgical precautions   Assessment of Occupational Performance 5 or more Performance Deficits   Identified Performance Deficits Impaired dressing bathing anbd toileting independence   Planned Therapy Interventions (OT) ADL retraining;cognition;progressive activity/exercise   Clinical Decision Making Complexity (OT) detailed assessment/moderate complexity   Risk & Benefits of therapy have been explained evaluation/treatment results reviewed;care plan/treatment goals reviewed;risks/benefits reviewed;current/potential barriers reviewed;participants voiced agreement with care plan;patient;participants included   OT Total Evaluation Time   OT Eval, Moderate Complexity Minutes (86201) 10   OT Goals   Therapy Frequency (OT) Daily   OT Predicted Duration/Target Date for Goal Attainment 07/13/25   OT Goals Hygiene/Grooming;Lower Body Dressing;Lower Body Bathing;Toilet Transfer/Toileting;Cognition   OT: Hygiene/Grooming modified independent;using adaptive equipment;within precautions;while standing   OT: Lower Body Dressing Modified independent;using adaptive equipment;within precautions;including set-up/clothing retrieval   OT: Lower Body Bathing Modified independent;using adaptive equipment;with precautions   OT: Toilet Transfer/Toileting Modified independent;toilet transfer;cleaning and garment management;using adaptive equipment;within precautions   OT: Cognitive Patient/caregiver will verbalize understanding of cognitive assessment results/recommendations as needed for safe discharge planning   Self-Care/Home Management   Self-Care/Home Mgmt/ADL, Compensatory, Meal Prep Minutes (60093) 27   Symptoms Noted During/After Treatment (Meal Preparation/Planning Training) fatigue;increased pain   Treatment Detail/Skilled Intervention Ot; Pt agreeable to therapy. Pt reports the head of his bed raises and  lowers, this appears questionable. Pt completed supine to sit EOB with SBA. Pt educated on figure four to improve LE dressing, pt refused to trial stating his neice will assist at discharge. Pt educated on AE and techniques to improve independence so pt will not be reliant on caregiver, pt appears mildly self limiting. Pt compelted sit to stand from raised bed with cGA and cues for hand placement on walker. Pt ambulated to bathroom with CGA and cues for safety, one slight LOB during turn to toilet. Pt completed toilet transfer with min assist due to height and grab bar. Pt required max assist to manage brief due to imbalance when attempting to bend forward. Pt ambulated to chair. PT educated on AE for home including riser with grab bars and tub transfer bench. Pt reports understnading but appears questionable. Pt required cues throughout to maintain heel wBPt in chair with alarm onand call light upon OT departure   OT Discharge Planning   OT Plan Monitor cog. LE dressing with AE, toileting   OT Discharge Recommendation (DC Rec) home with home care occupational therapy;home with assist;Transitional Care Facility   OT Rationale for DC Rec Patient appears below baseline with ADLs and mobility. Pt reports he wants to return home and that his neice is moving in with him and will be able to provide 24/7 assist. If pt neice is able to provide 24/7 assist with all mobility and ADLs, he is able to return home with HHOT to progress ADL independence. If this level of assist is not available, pt will require TCU and eventual more supportive living environment due to cogntiive impairment (8/30 on the SLUMS)   OT Brief overview of current status Goals of therapy will be to address safe mobility and make recs for d/c to next level of care. Pt and RN will continue to follow all falls risk precautions as documented by RN staff while hospitalized.   OT Total Distance Amb During Session (feet) 40

## 2025-07-05 ENCOUNTER — ANESTHESIA EVENT (OUTPATIENT)
Dept: SURGERY | Facility: CLINIC | Age: OVER 89
DRG: 253 | End: 2025-07-05
Payer: COMMERCIAL

## 2025-07-05 ENCOUNTER — APPOINTMENT (OUTPATIENT)
Dept: GENERAL RADIOLOGY | Facility: CLINIC | Age: OVER 89
DRG: 253 | End: 2025-07-05
Attending: PODIATRIST
Payer: COMMERCIAL

## 2025-07-05 ENCOUNTER — APPOINTMENT (OUTPATIENT)
Dept: GENERAL RADIOLOGY | Facility: CLINIC | Age: OVER 89
DRG: 253 | End: 2025-07-05
Attending: INTERNAL MEDICINE
Payer: COMMERCIAL

## 2025-07-05 ENCOUNTER — ANESTHESIA (OUTPATIENT)
Dept: SURGERY | Facility: CLINIC | Age: OVER 89
DRG: 253 | End: 2025-07-05
Payer: COMMERCIAL

## 2025-07-05 LAB
ANION GAP SERPL CALCULATED.3IONS-SCNC: 8 MMOL/L (ref 7–15)
BUN SERPL-MCNC: 20 MG/DL (ref 8–23)
CALCIUM SERPL-MCNC: 8.5 MG/DL (ref 8.8–10.4)
CHLORIDE SERPL-SCNC: 107 MMOL/L (ref 98–107)
CREAT SERPL-MCNC: 0.97 MG/DL (ref 0.67–1.17)
EGFRCR SERPLBLD CKD-EPI 2021: 73 ML/MIN/1.73M2
ERYTHROCYTE [DISTWIDTH] IN BLOOD BY AUTOMATED COUNT: 15.6 % (ref 10–15)
GLUCOSE SERPL-MCNC: 115 MG/DL (ref 70–99)
HCO3 SERPL-SCNC: 25 MMOL/L (ref 22–29)
HCT VFR BLD AUTO: 30.3 % (ref 40–53)
HGB BLD-MCNC: 9.7 G/DL (ref 13.3–17.7)
MCH RBC QN AUTO: 27.2 PG (ref 26.5–33)
MCHC RBC AUTO-ENTMCNC: 32 G/DL (ref 31.5–36.5)
MCV RBC AUTO: 85 FL (ref 78–100)
PLATELET # BLD AUTO: 195 10E3/UL (ref 150–450)
POTASSIUM SERPL-SCNC: 4.5 MMOL/L (ref 3.4–5.3)
RBC # BLD AUTO: 3.57 10E6/UL (ref 4.4–5.9)
SODIUM SERPL-SCNC: 140 MMOL/L (ref 135–145)
WBC # BLD AUTO: 8 10E3/UL (ref 4–11)

## 2025-07-05 PROCEDURE — 250N000009 HC RX 250: Performed by: NURSE ANESTHETIST, CERTIFIED REGISTERED

## 2025-07-05 PROCEDURE — 250N000011 HC RX IP 250 OP 636: Performed by: ANESTHESIOLOGY

## 2025-07-05 PROCEDURE — 250N000013 HC RX MED GY IP 250 OP 250 PS 637: Performed by: INTERNAL MEDICINE

## 2025-07-05 PROCEDURE — 250N000011 HC RX IP 250 OP 636: Performed by: PODIATRIST

## 2025-07-05 PROCEDURE — 360N000082 HC SURGERY LEVEL 2 W/ FLUORO, PER MIN: Performed by: PODIATRIST

## 2025-07-05 PROCEDURE — 250N000013 HC RX MED GY IP 250 OP 250 PS 637: Performed by: PODIATRIST

## 2025-07-05 PROCEDURE — 710N000009 HC RECOVERY PHASE 1, LEVEL 1, PER MIN: Performed by: PODIATRIST

## 2025-07-05 PROCEDURE — 272N000001 HC OR GENERAL SUPPLY STERILE: Performed by: PODIATRIST

## 2025-07-05 PROCEDURE — 99233 SBSQ HOSP IP/OBS HIGH 50: CPT | Performed by: INTERNAL MEDICINE

## 2025-07-05 PROCEDURE — 250N000011 HC RX IP 250 OP 636: Performed by: INTERNAL MEDICINE

## 2025-07-05 PROCEDURE — 71045 X-RAY EXAM CHEST 1 VIEW: CPT

## 2025-07-05 PROCEDURE — 258N000003 HC RX IP 258 OP 636: Performed by: INTERNAL MEDICINE

## 2025-07-05 PROCEDURE — 85027 COMPLETE CBC AUTOMATED: CPT | Performed by: PODIATRIST

## 2025-07-05 PROCEDURE — 0Y6Q0Z1 DETACHMENT AT LEFT 1ST TOE, HIGH, OPEN APPROACH: ICD-10-PCS | Performed by: PODIATRIST

## 2025-07-05 PROCEDURE — 258N000003 HC RX IP 258 OP 636: Performed by: ANESTHESIOLOGY

## 2025-07-05 PROCEDURE — 999N000065 XR FOOT PORT LEFT 3 VIEWS: Mod: LT

## 2025-07-05 PROCEDURE — 88311 DECALCIFY TISSUE: CPT | Mod: TC | Performed by: PODIATRIST

## 2025-07-05 PROCEDURE — 250N000011 HC RX IP 250 OP 636: Performed by: NURSE ANESTHETIST, CERTIFIED REGISTERED

## 2025-07-05 PROCEDURE — 28825 PARTIAL AMPUTATION OF TOE: CPT | Mod: 79 | Performed by: PODIATRIST

## 2025-07-05 PROCEDURE — 80048 BASIC METABOLIC PNL TOTAL CA: CPT | Performed by: PODIATRIST

## 2025-07-05 PROCEDURE — 370N000017 HC ANESTHESIA TECHNICAL FEE, PER MIN: Performed by: PODIATRIST

## 2025-07-05 PROCEDURE — L3260 AMBULATORY SURGICAL BOOT EAC: HCPCS

## 2025-07-05 PROCEDURE — 999N000141 HC STATISTIC PRE-PROCEDURE NURSING ASSESSMENT: Performed by: PODIATRIST

## 2025-07-05 PROCEDURE — 120N000001 HC R&B MED SURG/OB

## 2025-07-05 PROCEDURE — 36415 COLL VENOUS BLD VENIPUNCTURE: CPT | Performed by: PODIATRIST

## 2025-07-05 RX ORDER — OXYCODONE HYDROCHLORIDE 5 MG/1
5 TABLET ORAL EVERY 4 HOURS PRN
Status: DISCONTINUED | OUTPATIENT
Start: 2025-07-05 | End: 2025-07-09 | Stop reason: HOSPADM

## 2025-07-05 RX ORDER — PROPOFOL 10 MG/ML
INJECTION, EMULSION INTRAVENOUS PRN
Status: DISCONTINUED | OUTPATIENT
Start: 2025-07-05 | End: 2025-07-05

## 2025-07-05 RX ORDER — BUPIVACAINE HYDROCHLORIDE 5 MG/ML
INJECTION, SOLUTION EPIDURAL; INTRACAUDAL; PERINEURAL
Status: DISCONTINUED
Start: 2025-07-05 | End: 2025-07-05 | Stop reason: HOSPADM

## 2025-07-05 RX ORDER — HYDROMORPHONE HCL IN WATER/PF 6 MG/30 ML
0.4 PATIENT CONTROLLED ANALGESIA SYRINGE INTRAVENOUS EVERY 5 MIN PRN
Status: DISCONTINUED | OUTPATIENT
Start: 2025-07-05 | End: 2025-07-05 | Stop reason: HOSPADM

## 2025-07-05 RX ORDER — LIDOCAINE 40 MG/G
CREAM TOPICAL
Status: DISCONTINUED | OUTPATIENT
Start: 2025-07-05 | End: 2025-07-09 | Stop reason: HOSPADM

## 2025-07-05 RX ORDER — ONDANSETRON 4 MG/1
4 TABLET, ORALLY DISINTEGRATING ORAL EVERY 30 MIN PRN
Status: DISCONTINUED | OUTPATIENT
Start: 2025-07-05 | End: 2025-07-05 | Stop reason: HOSPADM

## 2025-07-05 RX ORDER — HYDROMORPHONE HCL IN WATER/PF 6 MG/30 ML
0.2 PATIENT CONTROLLED ANALGESIA SYRINGE INTRAVENOUS EVERY 5 MIN PRN
Status: DISCONTINUED | OUTPATIENT
Start: 2025-07-05 | End: 2025-07-05 | Stop reason: HOSPADM

## 2025-07-05 RX ORDER — BISACODYL 10 MG
10 SUPPOSITORY, RECTAL RECTAL DAILY PRN
Status: DISCONTINUED | OUTPATIENT
Start: 2025-07-08 | End: 2025-07-09 | Stop reason: HOSPADM

## 2025-07-05 RX ORDER — BUPIVACAINE HYDROCHLORIDE 5 MG/ML
INJECTION, SOLUTION EPIDURAL; INTRACAUDAL; PERINEURAL PRN
Status: DISCONTINUED | OUTPATIENT
Start: 2025-07-05 | End: 2025-07-05 | Stop reason: HOSPADM

## 2025-07-05 RX ORDER — SODIUM CHLORIDE, SODIUM LACTATE, POTASSIUM CHLORIDE, CALCIUM CHLORIDE 600; 310; 30; 20 MG/100ML; MG/100ML; MG/100ML; MG/100ML
INJECTION, SOLUTION INTRAVENOUS CONTINUOUS
Status: DISCONTINUED | OUTPATIENT
Start: 2025-07-05 | End: 2025-07-05 | Stop reason: HOSPADM

## 2025-07-05 RX ORDER — NALOXONE HYDROCHLORIDE 0.4 MG/ML
0.1 INJECTION, SOLUTION INTRAMUSCULAR; INTRAVENOUS; SUBCUTANEOUS
Status: DISCONTINUED | OUTPATIENT
Start: 2025-07-05 | End: 2025-07-05 | Stop reason: HOSPADM

## 2025-07-05 RX ORDER — ACETAMINOPHEN 325 MG/1
975 TABLET ORAL EVERY 8 HOURS
Status: DISCONTINUED | OUTPATIENT
Start: 2025-07-05 | End: 2025-07-09 | Stop reason: HOSPADM

## 2025-07-05 RX ORDER — FENTANYL CITRATE 0.05 MG/ML
25 INJECTION, SOLUTION INTRAMUSCULAR; INTRAVENOUS EVERY 5 MIN PRN
Status: DISCONTINUED | OUTPATIENT
Start: 2025-07-05 | End: 2025-07-05 | Stop reason: HOSPADM

## 2025-07-05 RX ORDER — DEXAMETHASONE SODIUM PHOSPHATE 4 MG/ML
4 INJECTION, SOLUTION INTRA-ARTICULAR; INTRALESIONAL; INTRAMUSCULAR; INTRAVENOUS; SOFT TISSUE
Status: DISCONTINUED | OUTPATIENT
Start: 2025-07-05 | End: 2025-07-05 | Stop reason: HOSPADM

## 2025-07-05 RX ORDER — CEFAZOLIN SODIUM/WATER 2 G/20 ML
2 SYRINGE (ML) INTRAVENOUS SEE ADMIN INSTRUCTIONS
Status: DISCONTINUED | OUTPATIENT
Start: 2025-07-05 | End: 2025-07-05 | Stop reason: HOSPADM

## 2025-07-05 RX ORDER — OXYCODONE HYDROCHLORIDE 5 MG/1
10 TABLET ORAL EVERY 4 HOURS PRN
Status: DISCONTINUED | OUTPATIENT
Start: 2025-07-05 | End: 2025-07-09 | Stop reason: HOSPADM

## 2025-07-05 RX ORDER — ONDANSETRON 2 MG/ML
4 INJECTION INTRAMUSCULAR; INTRAVENOUS EVERY 30 MIN PRN
Status: DISCONTINUED | OUTPATIENT
Start: 2025-07-05 | End: 2025-07-05 | Stop reason: HOSPADM

## 2025-07-05 RX ORDER — CEFAZOLIN SODIUM/WATER 2 G/20 ML
2 SYRINGE (ML) INTRAVENOUS
Status: COMPLETED | OUTPATIENT
Start: 2025-07-05 | End: 2025-07-05

## 2025-07-05 RX ORDER — POLYETHYLENE GLYCOL 3350 17 G/17G
17 POWDER, FOR SOLUTION ORAL DAILY
Status: DISCONTINUED | OUTPATIENT
Start: 2025-07-06 | End: 2025-07-09 | Stop reason: HOSPADM

## 2025-07-05 RX ORDER — ONDANSETRON 2 MG/ML
INJECTION INTRAMUSCULAR; INTRAVENOUS PRN
Status: DISCONTINUED | OUTPATIENT
Start: 2025-07-05 | End: 2025-07-05

## 2025-07-05 RX ORDER — LIDOCAINE HYDROCHLORIDE 20 MG/ML
INJECTION, SOLUTION INFILTRATION; PERINEURAL PRN
Status: DISCONTINUED | OUTPATIENT
Start: 2025-07-05 | End: 2025-07-05

## 2025-07-05 RX ORDER — FUROSEMIDE 10 MG/ML
20 INJECTION INTRAMUSCULAR; INTRAVENOUS ONCE
Status: COMPLETED | OUTPATIENT
Start: 2025-07-05 | End: 2025-07-05

## 2025-07-05 RX ORDER — AMOXICILLIN 250 MG
1 CAPSULE ORAL 2 TIMES DAILY
Status: DISCONTINUED | OUTPATIENT
Start: 2025-07-05 | End: 2025-07-09 | Stop reason: HOSPADM

## 2025-07-05 RX ORDER — FENTANYL CITRATE 0.05 MG/ML
50 INJECTION, SOLUTION INTRAMUSCULAR; INTRAVENOUS EVERY 5 MIN PRN
Status: DISCONTINUED | OUTPATIENT
Start: 2025-07-05 | End: 2025-07-05 | Stop reason: HOSPADM

## 2025-07-05 RX ORDER — PROPOFOL 10 MG/ML
INJECTION, EMULSION INTRAVENOUS CONTINUOUS PRN
Status: DISCONTINUED | OUTPATIENT
Start: 2025-07-05 | End: 2025-07-05

## 2025-07-05 RX ADMIN — SODIUM CHLORIDE: 900 INJECTION INTRAVENOUS at 00:14

## 2025-07-05 RX ADMIN — METOPROLOL TARTRATE 25 MG: 25 TABLET, FILM COATED ORAL at 21:42

## 2025-07-05 RX ADMIN — METRONIDAZOLE 500 MG: 500 INJECTION, SOLUTION INTRAVENOUS at 15:23

## 2025-07-05 RX ADMIN — ENOXAPARIN SODIUM 40 MG: 40 INJECTION SUBCUTANEOUS at 15:23

## 2025-07-05 RX ADMIN — OXYCODONE HYDROCHLORIDE 5 MG: 5 TABLET ORAL at 21:46

## 2025-07-05 RX ADMIN — METRONIDAZOLE 500 MG: 500 INJECTION, SOLUTION INTRAVENOUS at 00:13

## 2025-07-05 RX ADMIN — METOPROLOL TARTRATE 25 MG: 25 TABLET, FILM COATED ORAL at 05:53

## 2025-07-05 RX ADMIN — SODIUM CHLORIDE, SODIUM LACTATE, POTASSIUM CHLORIDE, AND CALCIUM CHLORIDE: .6; .31; .03; .02 INJECTION, SOLUTION INTRAVENOUS at 07:16

## 2025-07-05 RX ADMIN — DOCUSATE SODIUM 50 MG AND SENNOSIDES 8.6 MG 1 TABLET: 8.6; 5 TABLET, FILM COATED ORAL at 09:43

## 2025-07-05 RX ADMIN — FENTANYL CITRATE 50 MCG: 50 INJECTION, SOLUTION INTRAMUSCULAR; INTRAVENOUS at 08:49

## 2025-07-05 RX ADMIN — METRONIDAZOLE 500 MG: 500 INJECTION, SOLUTION INTRAVENOUS at 07:50

## 2025-07-05 RX ADMIN — DOXAZOSIN 8 MG: 4 TABLET ORAL at 21:42

## 2025-07-05 RX ADMIN — APIXABAN 5 MG: 5 TABLET, FILM COATED ORAL at 21:42

## 2025-07-05 RX ADMIN — LIDOCAINE HYDROCHLORIDE 50 MG: 20 INJECTION, SOLUTION INFILTRATION; PERINEURAL at 07:39

## 2025-07-05 RX ADMIN — FUROSEMIDE 20 MG: 10 INJECTION, SOLUTION INTRAMUSCULAR; INTRAVENOUS at 13:51

## 2025-07-05 RX ADMIN — OXYCODONE HYDROCHLORIDE 5 MG: 5 TABLET ORAL at 18:05

## 2025-07-05 RX ADMIN — CEFEPIME 2 G: 2 INJECTION, POWDER, FOR SOLUTION INTRAVENOUS at 10:36

## 2025-07-05 RX ADMIN — FENTANYL CITRATE 25 MCG: 50 INJECTION, SOLUTION INTRAMUSCULAR; INTRAVENOUS at 08:22

## 2025-07-05 RX ADMIN — CEFEPIME 2 G: 2 INJECTION, POWDER, FOR SOLUTION INTRAVENOUS at 23:39

## 2025-07-05 RX ADMIN — FENTANYL CITRATE 25 MCG: 50 INJECTION, SOLUTION INTRAMUSCULAR; INTRAVENOUS at 08:37

## 2025-07-05 RX ADMIN — ACETAMINOPHEN 975 MG: 325 TABLET ORAL at 16:31

## 2025-07-05 RX ADMIN — ONDANSETRON 4 MG: 2 INJECTION INTRAMUSCULAR; INTRAVENOUS at 07:41

## 2025-07-05 RX ADMIN — ATORVASTATIN CALCIUM 40 MG: 40 TABLET, FILM COATED ORAL at 23:39

## 2025-07-05 RX ADMIN — PROPOFOL 75 MCG/KG/MIN: 10 INJECTION, EMULSION INTRAVENOUS at 07:39

## 2025-07-05 RX ADMIN — OXYCODONE HYDROCHLORIDE 5 MG: 5 TABLET ORAL at 12:53

## 2025-07-05 RX ADMIN — Medication 2 G: at 07:32

## 2025-07-05 RX ADMIN — HYDROCHLOROTHIAZIDE 12.5 MG: 12.5 TABLET ORAL at 16:33

## 2025-07-05 RX ADMIN — PROPOFOL 20 MG: 10 INJECTION, EMULSION INTRAVENOUS at 07:39

## 2025-07-05 RX ADMIN — ACETAMINOPHEN 975 MG: 325 TABLET ORAL at 09:43

## 2025-07-05 ASSESSMENT — ACTIVITIES OF DAILY LIVING (ADL)
ADLS_ACUITY_SCORE: 75
ADLS_ACUITY_SCORE: 74
ADLS_ACUITY_SCORE: 70
ADLS_ACUITY_SCORE: 70
ADLS_ACUITY_SCORE: 75
ADLS_ACUITY_SCORE: 70
ADLS_ACUITY_SCORE: 74
ADLS_ACUITY_SCORE: 70
ADLS_ACUITY_SCORE: 70
ADLS_ACUITY_SCORE: 75
ADLS_ACUITY_SCORE: 70
ADLS_ACUITY_SCORE: 70
ADLS_ACUITY_SCORE: 75
ADLS_ACUITY_SCORE: 74
ADLS_ACUITY_SCORE: 70
ADLS_ACUITY_SCORE: 70
ADLS_ACUITY_SCORE: 75
ADLS_ACUITY_SCORE: 70
ADLS_ACUITY_SCORE: 70

## 2025-07-05 ASSESSMENT — LIFESTYLE VARIABLES: TOBACCO_USE: 0

## 2025-07-05 ASSESSMENT — ENCOUNTER SYMPTOMS
DYSRHYTHMIAS: 0
SEIZURES: 0

## 2025-07-05 NOTE — PROGRESS NOTES
VASCULAR SURGERY PROGRESS NOTE    Patient in the OR this morning for his Podiatry surgery.     On chart review, he has been AFVSS with [facility].     A/P: POD 3 s/p right to left fem-fem bypass with Artegraft for left 1st and 3rd toe dry gangrene. He is going to the OR today with Podiatry for debridement/amputation. We will follow-up regarding how well he bleeds during his surgery, although we have discussed with the patient and his niece that his blood flow is as improved as it can get in the left leg.     - Continue doppler checks to LLE and along bypass graft  - Further care per primary team  - Will continue to follow     Staff: Dr Elie Macias MD  PGY-[id number]  Vascular Surgery  Pager: [phone]    Please page me directly with any questions/concerns during regular weekday hours before 5PM. If there is no response, if it is a weekend, or if it is during evening hours, then please use the [employer] system to page the first-call resident on call for vascular surgery.

## 2025-07-05 NOTE — ANESTHESIA PREPROCEDURE EVALUATION
Anesthesia Pre-Procedure Evaluation    Patient: Angelica Maharaj   MRN: [id number] : 11/10/1932          Procedure : Procedure(s):  Partial left great toe amputation         Past Medical History:   Diagnosis Date    BPH (benign prostatic hyperplasia)     Coronary artery disease     s/p CABG    Iron deficiency anemia     Peripheral artery disease     S/p bilateral femoral to below-knee popliteal artery bypass graft      Past Surgical History:   Procedure Laterality Date    BYPASS GRAFT FEMORAL FEMORAL Bilateral 2025    Procedure: RIGHT COMMON FEMORAL TO LEFT PROFUNDUS FEMORAL BYPASS USING 6MMX 31MM ARTEGRAFT COLLAGEN VASCULAR GRAFT; LEFT PROFUNDUS FEMORAL ENDARTERECTOMY;  Surgeon: Alejandro Delgadillo MD;  Location: [facility]    VASCULAR SURGERY        Allergies   Allergen Reactions    Alendronate     Simvastatin       Social History     Tobacco Use    Smoking status: Former     Types: Cigarettes    Smokeless tobacco: Never   Substance Use Topics    Alcohol use: Yes     Comment: beer once in a while      Wt Readings from Last 1 Encounters:   25 88.2 kg (194 lb 7.1 oz)        Anesthesia Evaluation    Type: General.        ROS/MED HX  ENT/Pulmonary:    (-) tobacco use, asthma and sleep apnea   Neurologic: Comment: Cognitive impairment    (+)    no peripheral neuropathy                         (-) no seizures and no CVA   Cardiovascular:     (+)  hypertension- Peripheral Vascular Disease-  CAD -  CABG-date: ~. -                                 Previous cardiac testing   Echo: Date: Results:    Stress Test:  Date:  Results:  Myocardial perfusion imaging is normal without evidence of infarct or   ischemia.     Stress test findings suggests that patient is low risk (<1% annual cardiac   mortality rate).     Overall left ventricular systolic function is normal calculated at 63%   without regional wall motion abnormalities.       ECG Reviewed:  Date: Results:    Cath:  Date: Results:   (-) arrhythmias  "  METS/Exercise Tolerance:     Hematologic:     (+) History of blood clots,               Musculoskeletal:       GI/Hepatic:    (-) GERD   Renal/Genitourinary:     (+) renal disease, type: CRI,            Endo:    (-) Type II DM and thyroid disease   Psychiatric/Substance Use:       Infectious Disease:    (-) Recent Fever   Malignancy:       Other:              Physical Exam  Airway  Mallampati: II  TM distance: >3 FB  Neck ROM: full    Cardiovascular - normal exam   Dental   (+) Multiple visibly decayed, broken teeth      Pulmonary - normal exam      Neurological   Other Findings       OUTSIDE LABS:  CBC:   Lab Results   Component Value Date    WBC 8.1 07/03/2025    WBC 7.2 07/02/2025    HGB 9.0 (L) 07/03/2025    HGB 10.1 (L) 07/02/2025    HCT 28.4 (L) 07/03/2025    HCT 32.3 (L) 07/02/2025     07/03/2025     07/02/2025     BMP:   Lab Results   Component Value Date     07/04/2025     07/03/2025    POTASSIUM 4.2 07/04/2025    POTASSIUM 4.3 07/03/2025    CHLORIDE 103 07/03/2025    CHLORIDE 107 07/02/2025    CO2 26 07/03/2025    CO2 24 07/02/2025    BUN 22.6 07/03/2025    BUN 25.4 (H) 07/02/2025    CR 1.06 07/04/2025    CR 1.03 07/03/2025    GLC 92 07/04/2025     (H) 07/03/2025     (H) 07/03/2025     COAGS:   Lab Results   Component Value Date    PTT 34 07/01/2025    INR 1.60 (H) 07/01/2025     POC: No results found for: \"BGM\", \"HCG\", \"HCGS\"  HEPATIC:   Lab Results   Component Value Date    ALBUMIN 3.3 (L) 07/01/2025    PROTTOTAL 6.9 07/01/2025    ALT 14 07/01/2025    AST 36 07/01/2025    ALKPHOS 79 07/01/2025    BILITOTAL 0.4 07/01/2025     OTHER:   Lab Results   Component Value Date    LACT 1.1 07/01/2025    ALCIDES 8.7 (L) 07/03/2025    MAG 1.8 04/18/2025    TSH 2.60 04/18/2025       Anesthesia Plan    ASA Status:  3      NPO Status: NPO Appropriate   Anesthesia Type: MAC.  Maintenance: TIVA.   Techniques and Equipment:       - Monitoring Plan: standard ASA monitoring "     Consents    Anesthesia Plan(s) and associated risks, benefits, and realistic alternatives discussed. Questions answered and patient/representative(s) expressed understanding.     - Discussed: CRNA     - Discussed with:  Patient        - Pt is DNR/DNI Status: no DNR     Blood Consent:      - Discussed with: not discussed.     - Consented: consented to blood products     Postoperative Care    Pain management: multimodal analgesia.     Comments:                   Reyes Sharpe MD    I have reviewed the pertinent notes and labs in the chart from the past 30 days and (re)examined the patient.  Any updates or changes from those notes are reflected in this note.    Clinically Significant Risk Factors               # Hypoalbuminemia: Lowest albumin = 3.3 g/dL at 7/1/2025 10:06 AM, will monitor as appropriate     # Hypertension: Noted on problem list            # Overweight: Estimated body mass index is 26.37 kg/m  as calculated from the following:    Height as of this encounter: 1.829 m (6').    Weight as of this encounter: 88.2 kg (194 lb 7.1 oz)., PRESENT ON ADMISSION     # Financial/Environmental Concerns:

## 2025-07-05 NOTE — BRIEF OP NOTE
[facility]    Brief Operative Note    Pre-operative diagnosis: PAD (peripheral artery disease) [I73.9]  Toe ulcer, left, with unspecified severity (H) [L97.529]  Dry gangrene (H) [I96]  Osteomyelitis of great toe of left foot (H) [M86.9]  Post-operative diagnosis Same as pre-operative diagnosis    Procedure: Partial left great toe amputation, Left - Toe    Surgeon: Surgeons and Role:     * Viki Phillips DPM, Podiatry/Foot and Ankle Surgery - Primary  Anesthesia: MAC with Local   Estimated Blood Loss: Minimal    Drains: None  Specimens:   ID Type Source Tests Collected by Time Destination   1 : Left great toe - assess for bone infection Amputation, Non-Traumatic Toe, Left SURGICAL PATHOLOGY EXAM Viki Phillips DPM, Podiatry/Foot and Ankle Surgery 7/5/2025  7:52 AM      Findings:   None.  Complications: None.  Implants: * No implants in log *

## 2025-07-05 NOTE — ANESTHESIA POSTPROCEDURE EVALUATION
Patient: Angelica Maharaj    Procedure: Procedure(s):  Partial left great toe amputation       Anesthesia Type:  MAC    Note:  Disposition: Inpatient   Postop Pain Control: Uneventful            Sign Out: Well controlled pain   PONV: No   Neuro/Psych: Uneventful            Sign Out: Acceptable/Baseline neuro status   Airway/Respiratory: Uneventful            Sign Out: Acceptable/Baseline resp. status   CV/Hemodynamics: Uneventful            Sign Out: Acceptable CV status   Other NRE: NONE   DID A NON-ROUTINE EVENT OCCUR? No           Last vitals:  Vitals Value Taken Time   /50 07/05/25 09:00   Temp 36.2  C (97.2  F) 07/05/25 08:22   Pulse 52 07/05/25 09:12   Resp 28 07/05/25 09:12   SpO2 98 % 07/05/25 09:12   Vitals shown include unfiled device data.    Electronically Signed By: Reyes Sharpe MD  July 5, 2025  3:28 PM

## 2025-07-05 NOTE — PLAN OF CARE
Summary: [id number] 7/4/25  Left foot cellulitis with dry gangrene and possible osteomyelitis   Peripheral artery disease s/p bilateral femoral to below-knee popliteal artery bypass  Status post Right common femoral to left profundus femoral Artegraft bypass on 07/02/25 by ;    Orientation: disoriented to situation    Activity Level: Ax1 GB/W  Fall Risk: yes  Behavior & Aggression Tool Color: green  Pain Management: denies  ABNL VS/O2: VSS on RA  ABNL Lab/BG: n/a  Diet: reg  Bowel/Bladder: incontinent at times, voids in bathroom  Drains/Devices: PIV saline locked  Skin: bilateral groin sites CDI, L toe gangrene   Tests/Procedures for next shift: partial amputation 7/5/25   Anticipated DC date: TBD  Other Important Info: NPO midnight

## 2025-07-05 NOTE — OP NOTE
[facility] Podiatry Operative Note    Patient Name:                           Dariusz Maharaj  Patient YOB: 1932  Date of Surgery:                       7/5/2025  CSN:                                         [id number]    Pre-operative diagnosis: PAD (peripheral artery disease) [I73.9]  Toe ulcer, left, with unspecified severity (H) [L97.529]  Dry gangrene (H) [I96]  Osteomyelitis of great toe of left foot (H) [M86.9]   Post-operative diagnosis: Same   Procedure: 1.  Partial left great toe amputation   Surgeon: Viki Phillips DPM, Podiatry/Foot and Ankle Surgery   Assistant(s): None   Anesthesia: MAC with local     Hemostasis:                             None    Estimated Blood Loss:              1 mL    Speceimens:                            Left great toe per pathology    Materials:                                  None      Indications: Mr. Jaren Arellano is a 89-year-old male that presented to the hospital with ischemic ulcer to the left great toe.  Underwent vascular intervention to improve blood flow.  MRI showed possible bone infection to the distal phalanx of the left great toe.  Patient also has a  eschar over the left third toe which appears stable.  Given the significant ischemic tissue to the distal aspect of the left great toe and the likely osteomyelitis it was recommended to go in and remove part of the toe to try to remove any bone infection and ischemic tissue to try to help prevent infection.  The left third toe was felt to be stable and therefore did not feel the need to proceed with a partial toe amputation on this toe.  Risk benefits and complications were discussed with patient and patient's niece and they wish to proceed with surgery.      Procecure: Patient is brought to the operating room.  Anesthesia was administered and local was injected.  The foot was prepped and draped using sterile technique.  Attention was directed to the distal aspect of the left  great toe.  A fishmouth incision was made around the distal one half of the left great toe full-thickness down to bone with a #15 blade.  The tissue was sharply dissected off the base of the distal phalanx and head of the proximal phalanx.  The toe was disarticulated at the intra phalangeal joint.  This was sent for pathology.  Minimal bleeding was noted.  No purulent drainage was noted.  The distal one third head of the proximal phalanx was then removed using sagittal saw.  The wound was flushed with copious amounts of normal saline.  The skin was reapproximated using 4-0 Prolene.  Patient's foot was placed in a dry sterile dressing.  Patient tolerated anesthesia well was transferred to recovery with vital signs stable and vascular status intact.    Patient will be minimal weightbearing in a postop shoe.  Can restart carrol Phillips DPM

## 2025-07-05 NOTE — PLAN OF CARE
Goal Outcome Evaluation:        Pt A&Ox3, dis to time. VSS on RA. Reg diet. Up 1A GB&W, bedrest w/ BR privileges until POD #1 per podiatry, voiding in BR. Doppler pulses, CMS intact. Pain controlled w/ scheduled Tylenol & PRN Oxycodone. L foot dressing CDI. R PIVs SL w/ int abx. CXR done. Ortho, ID & Podiatry following. Discharge pending medical stability, Continue plan of Care.

## 2025-07-05 NOTE — ANESTHESIA CARE TRANSFER NOTE
Patient: Angelica Maharaj    Procedure: Procedure(s):  Partial left great toe amputation       Diagnosis: PAD (peripheral artery disease) [I73.9]  Toe ulcer, left, with unspecified severity (H) [L97.529]  Dry gangrene (H) [I96]  Osteomyelitis of great toe of left foot (H) [M86.9]  Diagnosis Additional Information: No value filed.    Anesthesia Type:   MAC     Note:    Oropharynx: oropharynx clear of all foreign objects and spontaneously breathing  Level of Consciousness: awake  Oxygen Supplementation: face mask  Level of Supplemental Oxygen (L/min / FiO2): 6  Independent Airway: airway patency satisfactory and stable  Dentition: dentition unchanged  Vital Signs Stable: post-procedure vital signs reviewed and stable  Report to RN Given: handoff report given  Patient transferred to: PACU  Comments: Pt to PACU with O2 via mask, airway patent, VSS. Report to RN.  Handoff Report: Identifed the Patient, Identified the Reponsible Provider, Reviewed the pertinent medical history, Discussed the surgical course, Reviewed Intra-OP anesthesia mangement and issues during anesthesia, Set expectations for post-procedure period and Allowed opportunity for questions and acknowledgement of understanding  Vitals:  Vitals Value Taken Time   BP     Temp     Pulse     Resp     SpO2         Electronically Signed By: STEPHEN Saha CRNA  July 5, 2025  8:12 AM

## 2025-07-05 NOTE — PROGRESS NOTES
Writer called orthotics to ask about post-op shoe and if they can come deliver it today since its for patient mobility. Per Orthotics they said they will come later this afternoon. Continue plan of Care.

## 2025-07-05 NOTE — PROVIDER NOTIFICATION
Writer notified podiatry  informing that pt took off foot dressing and what writer can rewrap with. Per  RN to rewrap dressing with 4x4, gauze pads, gauze wrap & tape. Writer completed. Continue plan of Care.

## 2025-07-05 NOTE — PROGRESS NOTES
[facility]    Medicine Progress Note - Hospitalist Service    Date of Admission:  7/1/2025    Assessment & Plan   Angelica Maharaj is a 89 year old male with past medical history significant for peripheral artery disease s/p bilateral femoral to below-knee popliteal artery bypass, coronary artery disease s/p CABG, hx of DVT, cognitive impairment who presents with left foot pain, found to have dry gangrene and significant PAD, transferred to [facility] on 7/1/2025 for further evaluation and treatment.    Left foot cellulitis with dry gangrene and possible osteomyelitis   Peripheral artery disease s/p bilateral femoral to below-knee popliteal artery bypass  Status post Right common femoral to left profundus femoral Artegraft bypass on 07/02/25 by ;  Status post partial left great toe amputation on 07/05/25:    Presented with progressive pain in left foot/toe   CTA runoff with previously seen occlusions as noted in read, with partial obscured left femoral to below-knee popliteal artery bypass graft with apparent significant stenoses within the mid distal portions of the graft  Left foot XR with cortical irregularity along the distal tuft of the distal phalanx of great toe, equivocal for osteomyelitis  Case discussed with vascular surgery prior to transfer, recommended transfer for formal evaluation and discussion with patient/family regarding potential interventions    --Patient was admitted for further evaluation and management.  --Vascular surgery and podiatry consultation were requested.  --Patient underwent right common femoral to left profundus femoral artery graft bypass by  on 07/02/2025  --Patient was also evaluated by infectious disease due to the concern for osteomyelitis.  --Vancomycin was discontinued as MRSA nears PCR came back negative.  --Continue  IV Cefepime and Flagyl.  --Per Vascular surgery, blood flow has been improved as much as possible to the left  leg.  --Patient underwent MRI of left foot which was incomplete was able to show probable bone marrow edema in the tuft of the distal phalanx of the first toe and in the distal and middle phalanges of the third toe.  These findings are not definitely confirmed due to motion artifact.  --Holding PTA apixaban due to surgical evaluations.  --will appreciate podiatry input when Apixaban can be started   --Holding subcu Lovenox for surgery.  --Continue PTA atorvastatin  --Holding PTA aspirin.  --Continue pain medications with acetaminophen, oxycodone and IV hydromorphone which is available if pain is not controlled with p.o. medications.  -- Further pain management per surgery team.    Essential Hypertension  -- Continue PTA metoprolol   -- Held PTA lisinopril and  hydrochlorothiazide pending surgical evaluation tomorrow morning   -- patient having some congested cough  -- will stop IV fluids , will do portable chest xray , will give one dose of iV lasix 20 mg today and start patient on PTA hydrochlorothiazide from tomorrow   -- continue to hold lisinopril today     Hx of DVT  Diagnosed 4/19/2025. US negative for DVT in ED.   -- Hold prior to admission apixaban as abvoe, will discuss with  when to restart eliquis      Coronary artery disease s/p CABG  -- Medication management as above     Chronic kidney disease, stage 2  Baseline creatinine 0.9-1. Creatinine mildly elevated at 1.20 on admission but now back to baseline.  -- IVF overnight once patient is NPO  -- Avoid nephrotoxins  -- BMP in process   -- Plan to start PTA lisinopril  today and hydrochlorothiazide from tomorrow.    BPH  -- Continue PTA Doxazosin     Cognitive impairment  Recent SLUMS of 8 per review of discharge summary 4/2025. No formal diagnosis of dementia per niece.   -- Re-orient as needed  -- Maintain normal day/night, sleep wake cycles  -- Minimize sedating/altering medications as able  -- Treat separate conditions as detailed above/below      Chronic normocytic anemia  Hx of iron deficiency anemia  Hgb 10.9 g/dl, at recent baseline.   -- Monitor CBC in AM    Diet: Room Service  Advance Diet as Tolerated: Regular Diet Adult    DVT Prophylaxis: Pneumatic Compression Devices  Boyd Catheter: Not present  Lines: None     Cardiac Monitoring: None  Code Status: Full Code      Clinically Significant Risk Factors               # Hypoalbuminemia: Lowest albumin = 3.3 g/dL at 7/1/2025 10:06 AM, will monitor as appropriate       # Hypertension: Noted on problem list            # Overweight: Estimated body mass index is 26.37 kg/m  as calculated from the following:    Height as of this encounter: 1.829 m (6').    Weight as of this encounter: 88.2 kg (194 lb 7.1 oz)., PRESENT ON ADMISSION     # Financial/Environmental Concerns:           Social Drivers of Health    Tobacco Use: Medium Risk (7/2/2025)    Patient History     Smoking Tobacco Use: Former     Smokeless Tobacco Use: Never          Disposition Plan     Medically Ready for Discharge: Anticipated in 2-4 Days , might be ready on Monday , on bedrest for 24 hours , will need PT/OT assessment later tomorrow if he will benefit from TCU after hospitalization     Kori Barksdale MD  Hospitalist Service  [facility]  Securely message with [employer] (more info)  Text page via [employer] Paging/Directory   ______________________________________________________________________    Interval History     Patient was seen and examined, feeling well, denying any complaints ut nursing has noticed some congested cough , underwent partial toe amputation earlier this morning   Discussed with him regarding reevaluation by therapies tomorrow morning     Physical Exam   Vital Signs: Temp: 98.1  F (36.7  C) Temp src: Oral BP: (!) 124/39 Pulse: 61   Resp: 15 SpO2: 95 % O2 Device: None (Room air) Oxygen Delivery: 2 LPM  Weight: 194 lbs 7.13 oz    Constitutional: Awake, alert, cooperative, no apparent distress.     Pulmonary: No increased work of breathing, good air exchange,few crackles in the bases.  Cardiovascular: Regular rate and rhythm, normal S1 and S2, no S3 or S4. No murmurs, rubs, or gallops noted.  GI: Normal bowel sounds, soft, non-distended, non-tender.  No guarding or rebound.  Extremities: LLE non-palpable DP/PT pulses. Patient has motor function and sensation to the left foot. There is dry gangrene of the 1st and 3rd toes with associated tenderness during exam.   Neuro: A&Ox4. Conversant. Responds appropriately in conversation. Moves all extremities with no focal deficit identified.     Medical Decision Making       52 MINUTES SPENT BY ME on the date of service doing chart review, history, exam, documentation & further activities per the note.      Data   ------------------------- PAST 24 HR DATA REVIEWED -----------------------------------------------    I have personally reviewed the following data over the past 24 hrs:    8.0  \   9.7 (L)   / 195     N/A N/A N/A /  N/A   N/A N/A N/A \       Imaging results reviewed over the past 24 hrs:   Recent Results (from the past 24 hours)   X-ray lt Foot 3 vw port: In PACU    Narrative    EXAM: XR FOOT PORT LEFT 3 VIEWS  LOCATION: [facility]  DATE: 7/5/2025    INDICATION: post op  COMPARISON: 07/01/2025      Impression    IMPRESSION: Partial great toe amputation at the level of the proximal phalangeal head with adjacent soft tissue swelling. No definitive radiographic evidence of ongoing active osteomyelitis. No fracture. Lesser toe hammertoe deformities. Plantar and   Achilles calcaneal spurs. Atherosclerotic vascular calcifications.

## 2025-07-06 ENCOUNTER — APPOINTMENT (OUTPATIENT)
Dept: PHYSICAL THERAPY | Facility: CLINIC | Age: OVER 89
DRG: 253 | End: 2025-07-06
Attending: PODIATRIST
Payer: COMMERCIAL

## 2025-07-06 ENCOUNTER — APPOINTMENT (OUTPATIENT)
Dept: OCCUPATIONAL THERAPY | Facility: CLINIC | Age: OVER 89
DRG: 253 | End: 2025-07-06
Attending: HOSPITALIST
Payer: COMMERCIAL

## 2025-07-06 ENCOUNTER — APPOINTMENT (OUTPATIENT)
Dept: CT IMAGING | Facility: CLINIC | Age: OVER 89
DRG: 253 | End: 2025-07-06
Attending: INTERNAL MEDICINE
Payer: COMMERCIAL

## 2025-07-06 LAB
ANION GAP SERPL CALCULATED.3IONS-SCNC: 8 MMOL/L (ref 7–15)
BACTERIA SPEC CULT: NO GROWTH
BACTERIA SPEC CULT: NO GROWTH
BUN SERPL-MCNC: 19.6 MG/DL (ref 8–23)
CALCIUM SERPL-MCNC: 8.5 MG/DL (ref 8.8–10.4)
CHLORIDE SERPL-SCNC: 106 MMOL/L (ref 98–107)
CREAT SERPL-MCNC: 1.01 MG/DL (ref 0.67–1.17)
EGFRCR SERPLBLD CKD-EPI 2021: 70 ML/MIN/1.73M2
GLUCOSE SERPL-MCNC: 98 MG/DL (ref 70–99)
GLUCOSE SERPL-MCNC: 98 MG/DL (ref 70–99)
HCO3 SERPL-SCNC: 25 MMOL/L (ref 22–29)
POTASSIUM SERPL-SCNC: 4.1 MMOL/L (ref 3.4–5.3)
SODIUM SERPL-SCNC: 139 MMOL/L (ref 135–145)

## 2025-07-06 PROCEDURE — 36415 COLL VENOUS BLD VENIPUNCTURE: CPT | Performed by: INTERNAL MEDICINE

## 2025-07-06 PROCEDURE — 250N000013 HC RX MED GY IP 250 OP 250 PS 637: Performed by: INTERNAL MEDICINE

## 2025-07-06 PROCEDURE — 120N000001 HC R&B MED SURG/OB

## 2025-07-06 PROCEDURE — 80048 BASIC METABOLIC PNL TOTAL CA: CPT | Performed by: INTERNAL MEDICINE

## 2025-07-06 PROCEDURE — 99233 SBSQ HOSP IP/OBS HIGH 50: CPT | Performed by: INTERNAL MEDICINE

## 2025-07-06 PROCEDURE — 99231 SBSQ HOSP IP/OBS SF/LOW 25: CPT | Performed by: PODIATRIST

## 2025-07-06 PROCEDURE — 250N000013 HC RX MED GY IP 250 OP 250 PS 637: Performed by: PODIATRIST

## 2025-07-06 PROCEDURE — 97535 SELF CARE MNGMENT TRAINING: CPT | Mod: GO

## 2025-07-06 PROCEDURE — 250N000011 HC RX IP 250 OP 636: Performed by: PODIATRIST

## 2025-07-06 PROCEDURE — 97530 THERAPEUTIC ACTIVITIES: CPT | Mod: GP | Performed by: PHYSICAL THERAPIST

## 2025-07-06 PROCEDURE — 71250 CT THORAX DX C-: CPT

## 2025-07-06 RX ADMIN — METOPROLOL TARTRATE 25 MG: 25 TABLET, FILM COATED ORAL at 20:03

## 2025-07-06 RX ADMIN — CEFEPIME 2 G: 2 INJECTION, POWDER, FOR SOLUTION INTRAVENOUS at 10:25

## 2025-07-06 RX ADMIN — HYDROCHLOROTHIAZIDE 12.5 MG: 12.5 TABLET ORAL at 09:12

## 2025-07-06 RX ADMIN — METRONIDAZOLE 500 MG: 500 INJECTION, SOLUTION INTRAVENOUS at 00:11

## 2025-07-06 RX ADMIN — OXYCODONE HYDROCHLORIDE 5 MG: 5 TABLET ORAL at 15:54

## 2025-07-06 RX ADMIN — OXYCODONE HYDROCHLORIDE 5 MG: 5 TABLET ORAL at 20:13

## 2025-07-06 RX ADMIN — APIXABAN 5 MG: 5 TABLET, FILM COATED ORAL at 09:12

## 2025-07-06 RX ADMIN — ATORVASTATIN CALCIUM 40 MG: 40 TABLET, FILM COATED ORAL at 20:03

## 2025-07-06 RX ADMIN — LISINOPRIL 15 MG: 10 TABLET ORAL at 09:12

## 2025-07-06 RX ADMIN — ACETAMINOPHEN 975 MG: 325 TABLET ORAL at 00:11

## 2025-07-06 RX ADMIN — APIXABAN 5 MG: 5 TABLET, FILM COATED ORAL at 20:03

## 2025-07-06 RX ADMIN — ACETAMINOPHEN 975 MG: 325 TABLET ORAL at 09:12

## 2025-07-06 RX ADMIN — ACETAMINOPHEN 975 MG: 325 TABLET ORAL at 18:28

## 2025-07-06 RX ADMIN — CEFEPIME 2 G: 2 INJECTION, POWDER, FOR SOLUTION INTRAVENOUS at 22:13

## 2025-07-06 RX ADMIN — METRONIDAZOLE 500 MG: 500 INJECTION, SOLUTION INTRAVENOUS at 15:57

## 2025-07-06 RX ADMIN — DOXAZOSIN 8 MG: 4 TABLET ORAL at 20:03

## 2025-07-06 RX ADMIN — METRONIDAZOLE 500 MG: 500 INJECTION, SOLUTION INTRAVENOUS at 09:18

## 2025-07-06 ASSESSMENT — ACTIVITIES OF DAILY LIVING (ADL)
ADLS_ACUITY_SCORE: 69
ADLS_ACUITY_SCORE: 70
ADLS_ACUITY_SCORE: 69
ADLS_ACUITY_SCORE: 65
ADLS_ACUITY_SCORE: 69

## 2025-07-06 NOTE — PROGRESS NOTES
Podiatry / Foot and Ankle Surgery Progress Note    July 6, 2025    Subject: Patient was seen at bedside.  Notes pain all over still but foot is okay.     Objective:  Vitals: /48 (BP Location: Right arm, Patient Position: Semi-Moreno's)   Pulse 70   Temp 98  F (36.7  C) (Oral)   Resp 18   Ht 1.829 m (6')   Wt 88.2 kg (194 lb 7.1 oz)   SpO2 94%   BMI 26.37 kg/m    BMI= Body mass index is 26.37 kg/m .    WBC Count   Date Value Ref Range Status   07/05/2025 8.0 4.0 - 11.0 10e3/uL Final       General:  Patient is alert and orientated.  NAD.    Dermatologic: Left foot  -dressing is clean dry and intact.  Sutures are intact.  No redness, dehiscence or signs of acute infection noted.    Full-thickness stage III ulceration to the dorsal aspect of the left third toe.  This measures approximately 1 cm x 1 cm x 0.1 cm.  No surrounding redness.  No drainage or malodor noted.     Vascular: Dorsalis pedis and posterior tibial pulses are difficult to palpate.     Neurologic: Lower extremity sensation is diminished, bilateral foot, to light touch.  No evidence of neurological-based weakness or contracture in the lower extremities.       Musculoskeletal: Patient is ambulatory without an assistive device or brace.  No gross foot or ankle deformity noted.        Imaging: post op left foot xray - I personally reviewed images. Partial great toe amputation at the level of the proximal phalangeal head with adjacent soft tissue swelling. No definitive radiographic evidence of ongoing active osteomyelitis. No fracture. Lesser toe hammertoe deformities. Plantar and   Achilles calcaneal spurs. Atherosclerotic vascular calcifications.     Hammertoe configurations. No evidence of an acute displaced fracture. Atherosclerotic vascular calcifications.     MRI left foot: Incomplete, nondiagnostic evaluation due to motion artifact and incomplete image acquisition.     2.  Probable bone marrow edema in the tuft of the distal phalanx of  the first toe and in the distal and middle phalanges of the third toe. These findings are not definitively confirmed due to motion artifact. The significance/severity of these findings   is uncertain due to the lack of study completion.     Assessment:    1.  Status post partial left great toe amputation  2. Peripheral Arterial Disease --> possible bypass planned for today   3.  Full-thickness stage III ulceration to the dorsal aspect of the left third toe.     Plan:    - Dressing was changed at bedside.    -Continue to keep foot and dressing dry.    - Will all bone infection has been removed and no further surgery is planned.     -Can be full WB. Will be WB to heel of LLE post op.      - Recommend every other day dressing changes to the left foot.  Apply Betadine to the incision area and the ulcer area.  Cover with Adaptic, 4 x 4 gauze pads, gauze roll and tape.    - Recommend follow-up with podiatry in 1 to 2 weeks from discharge from the hospital.  Discharge recommendations and orders placed.       -Podiatry will sign off.  Please call with further questions or concerns.    Viki Phillips DPM, Podiatry/Foot and Ankle Surgery  7:21 AM

## 2025-07-06 NOTE — PROGRESS NOTES
"VASCULAR SURGERY PROGRESS NOTE    S/p partial left great toe amputation with Podiatry yesterday, reportedly had \"minimal bleeding.\" Today, his pain is well controlled. He c/o cough overnight which he says is new. Pain well controlled.     EXAM:   NAD, sitting up comfortably in chair  RRR by peripheral pulse, NLB on RA  Palpable pulse and biphasic signal along fem-fem bypass with some expected post-op bruising/swelling which is resolving.  RLE WWP, strong monophasic DP/PT  LLE with dressings in place over toe amputation site. Monophasic doppler DP/PT.    LABS: pending    IMAGING: CXR 7/5/25 with some bibasilar atelectasis and large left perihilar prominence, per radiologist is likely a prominent PA but cannot exclude mediastinal mass/LAD.    A/P: POD 4 s/p right to left fem-fem bypass with Artegraft for left 1st and 3rd toe dry gangrene, POD 1 s/p partial left hallux amputation, reportedly with minimal bleeding. We have discussed with the patient and his niece that his blood flow is as improved as it can get in the left leg, and that he is not a good candidate for any further revascularization. The hope is that his blood supply is sufficient enough to at least heal his amputation incision.     - Continue doppler checks to LLE and along bypass graft  - Recommend encouraging pulmonary toilet  - Further care per primary team  - Will continue to follow      Staff: Dr Elie Macias MD  PGY-6  Vascular Surgery  Pager: [phone]    Please page me directly with any questions/concerns during regular weekday hours before 5PM. If there is no response, if it is a weekend, or if it is during evening hours, then please use the [employer] system to page the first-call resident on call for vascular surgery.    "

## 2025-07-06 NOTE — PROGRESS NOTES
[facility]    Medicine Progress Note - Hospitalist Service    Date of Admission:  7/1/2025    Assessment & Plan   Angelica Maharaj is a 89 year old male with past medical history significant for peripheral artery disease s/p bilateral femoral to below-knee popliteal artery bypass, coronary artery disease s/p CABG, hx of DVT, cognitive impairment who presents with left foot pain, found to have dry gangrene and significant PAD, transferred to [facility] on 7/1/2025 for further evaluation and treatment.    Left foot cellulitis with dry gangrene and possible osteomyelitis   Peripheral artery disease s/p bilateral femoral to below-knee popliteal artery bypass  Status post Right common femoral to left profundus femoral Artegraft bypass on 07/02/25 by ;  Status post partial left great toe amputation on 07/05/25:    Presented with progressive pain in left foot/toe   CTA runoff with previously seen occlusions as noted in read, with partial obscured left femoral to below-knee popliteal artery bypass graft with apparent significant stenoses within the mid distal portions of the graft  Left foot XR with cortical irregularity along the distal tuft of the distal phalanx of great toe, equivocal for osteomyelitis  Case discussed with vascular surgery prior to transfer, recommended transfer for formal evaluation and discussion with patient/family regarding potential interventions    -- Patient was admitted for further evaluation and management.  -- Vascular surgery and podiatry consultation were requested.  -- Patient underwent right common femoral to left profundus femoral artery graft bypass by  on 07/02/2025  -- Patient was also evaluated by infectious disease due to the concern for osteomyelitis.  -- Vancomycin was discontinued as MRSA nears PCR came back negative.  -- Continue  IV Cefepime and Flagyl.  -- Per Vascular surgery, blood flow has been improved as much as possible to the  left leg.  -- Patient underwent MRI of left foot which was incomplete was able to show probable bone marrow edema in the tuft of the distal phalanx of the first toe and in the distal and middle phalanges of the third toe.  -- Held PTA Apixaban due to surgical evaluations.  -- Podiatry ok with patient getting started back on Apixaban, started last evening 07/05/25  -- discontinue subcu Lovenox  -- Continue PTA Atorvastatin  -- Holding PTA Aspirin.  -- Continue pain medications with acetaminophen, oxycodone and IV hydromorphone which is available if pain is not controlled with p.o. medications.  -- Further weight bearing as per podiatry, patient will undergo therapies evaluation today to assess if he needs rehab.    Newly diagnosed left lower lobe mass suspicious for primary bronchogenic carcinoma:    --Patient complained of cough on 07/05/2025, due to the concern for possible fluid overload, chest x-ray completed  --X-ray showed interstitial prominence and possible atelectasis in the lower lungs prominent left hilar opacity which may represent enlarged pulmonary artery with CT was suggested to exclude mass/adenopathy  --CT chest without contrast ordered this morning 07/06, results reviewed  -Left lower lobe mass suspicious for primary bronchogenic carcinoma. Recommend follow-up PET/CT and/or tissue sampling.  -Left hilar/subcarinal lymphadenopathy, likely rafa metastatic disease.  -Macroscopic fat-containing tracheal nodule is indeterminate but could represent a tracheal lesion such as a hamartoma. Consider bronchoscopy.  -Small right and trace left pleural effusions.  -Mild nonspecific bilateral dependent consolidation.  -Few additional punctate pulmonary nodules are indeterminate. Attention on follow-up.  --Chest CT results reviewed with patient, despite cognitive decline, patient was able to understand regarding the concern for possible bronchogenic carcinoma on CT scan.  --According to patient, he does not wish  any further testing or treatment.  --I did have long discussion with ALYSSAsammy this morning  --After discussion, plan is to proceed with pulmonary consultation to review CT scan of the chest and if pulmonary can confirm/give Donita more insight regarding their expert opinion regarding the CT scan and possible options  --After talking with pulmonary, Elizabeth would be able to decide further decision how to proceed regarding this abnormal CAT scan.    Essential Hypertension  -- Continue PTA metoprolol   -- Resume PTA lisinopril and  hydrochlorothiazide today 07/06  -- Off IV fluids   -- Received one dose of IV lasix 20 mg X 1 on 07/05  -- Continue to hold lisinopril today     Hx of DVT  Diagnosed 4/19/2025. US negative for DVT in ED.   -- Restarted eliquis yesterday evening , patient might be at high risk for thromboembolism secondary to underlying malignancy.    Coronary artery disease s/p CABG  -- Medication management as above     Chronic kidney disease, stage 2  Baseline creatinine 0.9-1. Creatinine mildly elevated at 1.20 on admission but now back to baseline.  -- BMP reviewed, showing normal electrolytes and creatinine.    BPH  -- Continue PTA Doxazosin     Cognitive impairment  Recent SLUMS of 8 per review of discharge summary 4/2025. No formal diagnosis of dementia per niece.   -- Re-orient as needed  -- Maintain normal day/night, sleep wake cycles  -- Minimize sedating/altering medications as able  -- Treat separate conditions as detailed above/below     Chronic normocytic anemia  Hx of iron deficiency anemia  Hgb 10.9 g/dl, at recent baseline.   -- Monitor CBC intermittently, hemoglobin was stable around 9.7 on 07/05, recheck tomorrow    Diet: Advance Diet as Tolerated: Regular Diet Adult  Room Service    DVT Prophylaxis: Pneumatic Compression Devices  Boyd Catheter: Not present  Lines: None     Cardiac Monitoring: None  Code Status: Full Code      Clinically Significant Risk Factors               #  Hypoalbuminemia: Lowest albumin = 3.3 g/dL at 7/1/2025 10:06 AM, will monitor as appropriate       # Hypertension: Noted on problem list            # Overweight: Estimated body mass index is 26.37 kg/m  as calculated from the following:    Height as of this encounter: 1.829 m (6').    Weight as of this encounter: 88.2 kg (194 lb 7.1 oz).      # Financial/Environmental Concerns:           Social Drivers of Health    Tobacco Use: Medium Risk (7/2/2025)    Patient History     Smoking Tobacco Use: Former     Smokeless Tobacco Use: Never          Disposition Plan     Medically Ready for Discharge: Anticipated in 2-4 Days , might be ready on Monday or Tuesday while patient is awaiting pulmonary consultation, would appreciate pulmonary specialist calling Elizabeth with their input.  Patient will also undergo therapies evaluation today and if recommended to rehab then might need rehab before returning to his facility.    Kori Barksdale MD  Hospitalist Service  [facility]  Securely message with [employer] (more info)  Text page via [employer] Paging/Directory   ______________________________________________________________________    Interval History     Patient was seen and examined, lying in bed, denying any new complaints.  His breathing has been stable, discussed with him the chest x-ray results and then the CT scan findings which are concerning for possible primary bronchogenic carcinoma.  Patient does not want to get any further testing or treatment for the cancer.  He was in agreement that I should have more discussion with Donita.  Patient is otherwise denying any new complaints.    Physical Exam   Vital Signs: Temp: 98.5  F (36.9  C) Temp src: Oral BP: (!) 149/51 Pulse: 51   Resp: 16 SpO2: 93 % O2 Device: None (Room air)    Weight: 194 lbs 7.13 oz    Constitutional: Awake, alert, cooperative, no apparent distress.    Pulmonary: No increased work of breathing, good air exchange,few crackles in the  bases.  Cardiovascular: Regular rate and rhythm, normal S1 and S2, no S3 or S4. No murmurs, rubs, or gallops noted.  GI: Normal bowel sounds, soft, non-distended, non-tender.  No guarding or rebound.  Extremities: LLE non-palpable DP/PT pulses. Patient has motor function and sensation to the left foot. There is dry gangrene of the 1st and 3rd toes with associated tenderness during exam.   Neuro: A&Ox4. Conversant. Responds appropriately in conversation. Moves all extremities with no focal deficit identified.     Medical Decision Making       55 MINUTES SPENT BY ME on the date of service doing chart review, history, exam, documentation & further activities per the note.      Data   ------------------------- PAST 24 HR DATA REVIEWED -----------------------------------------------    I have personally reviewed the following data over the past 24 hrs:    N/A  \   N/A   / N/A     139 106 19.6 /  98; 98   4.1 25 1.01 \       Imaging results reviewed over the past 24 hrs:   Recent Results (from the past 24 hours)   CT Chest w/o Contrast    Narrative    EXAM: CT CHEST W/O CONTRAST  LOCATION: [facility]  DATE: 7/6/2025    INDICATION: rule out lung mass or lymphadenopathy , abnormal xray  COMPARISON: Chest radiograph 7/5/2025.  TECHNIQUE: CT chest without IV contrast. Multiplanar reformats were obtained. Dose reduction techniques were used.  CONTRAST: None.    FINDINGS:   LUNGS AND PLEURA: 1.4 cm heterogeneous nodule along the right posterior wall of the lower trachea with some macroscopic fat density (series 6, image 65). Left lower lobe mass measuring up to 4.5 cm in maximal dimension (series 4, image 134). Small right   and trace left pleural effusions. Mild bilateral dependent consolidation. Mild emphysema. Right middle lobe calcified granuloma. Few additional punctate pulmonary nodules.    MEDIASTINUM/AXILLAE: Cardiomegaly. Normal caliber thoracic aorta with moderate calcified atherosclerotic  plaque. Left hilar/subcarinal lymphadenopathy with a representative lymph node measuring 3.1 x 1.5 cm (series 3, image 64).    CORONARY ARTERY CALCIFICATION: Previous intervention (stents or CABG).    UPPER ABDOMEN: Bilateral adrenal nodules are stable since 10/11/2023, likely benign adenomas. Renal vascular calcifications and/or nonobstructing calculi.    MUSCULOSKELETAL: Thoracic spondylosis. Prior median sternotomy. No destructive osseous lesion.      Impression    IMPRESSION:   1.  Left lower lobe mass suspicious for primary bronchogenic carcinoma. Recommend follow-up PET/CT and/or tissue sampling.  2.  Left hilar/subcarinal lymphadenopathy, likely rafa metastatic disease.  3.  Macroscopic fat-containing tracheal nodule is indeterminate but could represent a tracheal lesion such as a hamartoma. Consider bronchoscopy.  4.  Small right and trace left pleural effusions.  5.  Mild nonspecific bilateral dependent consolidation.  6.  Few additional punctate pulmonary nodules are indeterminate. Attention on follow-up.

## 2025-07-06 NOTE — PROVIDER NOTIFICATION
Notified Hospitalist  of Chest CT results being posted. Also patient HR 51 and whether to give AM scheduled metropolol. Per MD hold metropolol and will review chest CT results w/ pt & niece. Continue plan of Care.

## 2025-07-06 NOTE — PLAN OF CARE
Goal Outcome Evaluation:       Pt A&Ox3, dis to time. VSS on RA ex Ger. Reg diet. Up 1A GB&W, voiding in BR. Doppler pulses, CMS intact. Pain controlled w/ scheduled Tylenol & PRN Oxycodone. L foot dressing changed by podiatry, CDI. R PIVs SL w/ int abx. Chest CT done. Ortho, ID & Podiatry following. Pulmonology consulted. Discharge pending medical stability, Continue plan of Care.

## 2025-07-06 NOTE — PROGRESS NOTES
1900 - 0730  Diagnosis: Left foot cellulitis w/ dry gangrene and possible osteomyelitis; PAD s/p bilateral femoral to below-knee popliteal artery bypass;Post Right common femoral to left profundus femoral artegraft bypass on 7/2/25 by Dr. Delgadillo  POD#: 4 for left fem-fem bypass with artegraft for left 1st and 3rd toe dry.   Mental Status: 3; Disoriented to   Activity/Dangle: A1 GB/W  Diet: Regular  Boyd/Voiding: Incontinent at times; Up to BR   O2/LDA: RA; PIV SL  DC Date: TBD  Other Information: VSS on RA. Bilateral groin sites CDI, L toe gangrene

## 2025-07-07 ENCOUNTER — APPOINTMENT (OUTPATIENT)
Dept: PHYSICAL THERAPY | Facility: CLINIC | Age: OVER 89
DRG: 253 | End: 2025-07-07
Attending: HOSPITALIST
Payer: COMMERCIAL

## 2025-07-07 PROCEDURE — 250N000013 HC RX MED GY IP 250 OP 250 PS 637: Performed by: PHYSICIAN ASSISTANT

## 2025-07-07 PROCEDURE — 250N000013 HC RX MED GY IP 250 OP 250 PS 637: Performed by: PODIATRIST

## 2025-07-07 PROCEDURE — 250N000013 HC RX MED GY IP 250 OP 250 PS 637: Performed by: INTERNAL MEDICINE

## 2025-07-07 PROCEDURE — 99232 SBSQ HOSP IP/OBS MODERATE 35: CPT | Performed by: PHYSICIAN ASSISTANT

## 2025-07-07 PROCEDURE — 97530 THERAPEUTIC ACTIVITIES: CPT | Mod: GP | Performed by: PHYSICAL THERAPIST

## 2025-07-07 PROCEDURE — 99232 SBSQ HOSP IP/OBS MODERATE 35: CPT | Performed by: HOSPITALIST

## 2025-07-07 PROCEDURE — 97116 GAIT TRAINING THERAPY: CPT | Mod: GP | Performed by: PHYSICAL THERAPIST

## 2025-07-07 PROCEDURE — 250N000011 HC RX IP 250 OP 636: Performed by: PODIATRIST

## 2025-07-07 PROCEDURE — G0545 PR INHRENT VISIT TO INPT/OBS W CNFRM/SUSPCT INFCT DIS BY INFCT DIS SPCIALST: HCPCS | Performed by: PHYSICIAN ASSISTANT

## 2025-07-07 PROCEDURE — 120N000001 HC R&B MED SURG/OB

## 2025-07-07 PROCEDURE — 99024 POSTOP FOLLOW-UP VISIT: CPT

## 2025-07-07 PROCEDURE — 99222 1ST HOSP IP/OBS MODERATE 55: CPT | Mod: 24 | Performed by: STUDENT IN AN ORGANIZED HEALTH CARE EDUCATION/TRAINING PROGRAM

## 2025-07-07 RX ORDER — METRONIDAZOLE 500 MG/1
500 TABLET ORAL 2 TIMES DAILY
Status: DISCONTINUED | OUTPATIENT
Start: 2025-07-07 | End: 2025-07-09 | Stop reason: HOSPADM

## 2025-07-07 RX ADMIN — OXYCODONE HYDROCHLORIDE 5 MG: 5 TABLET ORAL at 12:24

## 2025-07-07 RX ADMIN — APIXABAN 5 MG: 5 TABLET, FILM COATED ORAL at 09:02

## 2025-07-07 RX ADMIN — ACETAMINOPHEN 650 MG: 325 TABLET ORAL at 22:08

## 2025-07-07 RX ADMIN — OXYCODONE HYDROCHLORIDE 10 MG: 5 TABLET ORAL at 16:05

## 2025-07-07 RX ADMIN — CEFEPIME 2 G: 2 INJECTION, POWDER, FOR SOLUTION INTRAVENOUS at 10:58

## 2025-07-07 RX ADMIN — DOCUSATE SODIUM 50 MG AND SENNOSIDES 8.6 MG 1 TABLET: 8.6; 5 TABLET, FILM COATED ORAL at 20:55

## 2025-07-07 RX ADMIN — APIXABAN 5 MG: 5 TABLET, FILM COATED ORAL at 20:55

## 2025-07-07 RX ADMIN — ACETAMINOPHEN 975 MG: 325 TABLET ORAL at 09:02

## 2025-07-07 RX ADMIN — CEFEPIME 2 G: 2 INJECTION, POWDER, FOR SOLUTION INTRAVENOUS at 22:08

## 2025-07-07 RX ADMIN — METRONIDAZOLE 500 MG: 500 INJECTION, SOLUTION INTRAVENOUS at 09:53

## 2025-07-07 RX ADMIN — DOXAZOSIN 8 MG: 4 TABLET ORAL at 20:55

## 2025-07-07 RX ADMIN — OXYCODONE HYDROCHLORIDE 5 MG: 5 TABLET ORAL at 09:52

## 2025-07-07 RX ADMIN — METRONIDAZOLE 500 MG: 500 INJECTION, SOLUTION INTRAVENOUS at 00:37

## 2025-07-07 RX ADMIN — ACETAMINOPHEN 975 MG: 325 TABLET ORAL at 17:10

## 2025-07-07 RX ADMIN — METOPROLOL TARTRATE 25 MG: 25 TABLET, FILM COATED ORAL at 20:55

## 2025-07-07 RX ADMIN — HYDROCHLOROTHIAZIDE 12.5 MG: 12.5 TABLET ORAL at 09:02

## 2025-07-07 RX ADMIN — METRONIDAZOLE 500 MG: 500 TABLET ORAL at 20:54

## 2025-07-07 RX ADMIN — LISINOPRIL 15 MG: 10 TABLET ORAL at 09:01

## 2025-07-07 RX ADMIN — ATORVASTATIN CALCIUM 40 MG: 40 TABLET, FILM COATED ORAL at 20:55

## 2025-07-07 RX ADMIN — ACETAMINOPHEN 975 MG: 325 TABLET ORAL at 00:30

## 2025-07-07 ASSESSMENT — ACTIVITIES OF DAILY LIVING (ADL)
ADLS_ACUITY_SCORE: 65

## 2025-07-07 NOTE — PROGRESS NOTES
1900 - 0730  Diagnosis: Partial amp left great toe. Fem bypass right and left groin sites  POD#: 1 of amp, 5 of bypass  Mental Status: AOX4; forgetful at times  Activity/Dangle: A1 GB/W and post op shoes  Diet: Regular  Pain: Oxycodone and scheduled tylenol  Boyd/Voiding: BR; One large BM this AM.   Tele/Restraints/ISO: no   O2/LDA: RA; 2 PIV SL to right arm  DC Date: tbd  Other Information: New diagnosis of LLL mass.

## 2025-07-07 NOTE — PLAN OF CARE
Goal Outcome Evaluation:      Plan of Care Reviewed With: patient    Overall Patient Progress: no changeOverall Patient Progress: no change  Date/Time 7/4 moises    Trauma/Ortho/Medical (Choose one) ortho    Diagnosis:partial amp left great toe. Fem bypass right and left groin sites  POD#:1 of amp, 4 of bypass  Mental Status:forgetful but otherwise oriented  Activity/dangleup with 1/gait belt/walker and post op shoe  Diet:regular  Pain:oxycodone  Boyd/Voiding:BRP  Tele/Restraints/Iso:no  02/LDA:2 saline locks to right arm  D/C Date:?  Other Info:new DX of lung CA. Doppler ight foot.

## 2025-07-07 NOTE — CONSULTS
"[facility] Pulmonary Consult Note    Date of Service: 07/07/25    Assessment and Recommendations:  89M PAD s/p b/l femoral-popliteal bypass, CAD s/p CABG, prior DVT who was admitted 7/1 w/ dry gangrene. CT chest this admission w/ left lower lobe nodule w/ lymphadenopathy concerning for malignancy.     - if consistent with goals of care, needs outpatient interventional pulmonary referral for biopsy  - pt reports he does not want anything done for this presumed malignancy  - discussed w/ niece Donita   - OK to resume apixaban from a pulmonary perspective  - no further pulmonary work up needed inpatient    Pulmonary will sign off.     CC: LLL mass     Summary:  89M PAD s/p b/l femoral-popliteal bypass, CAD s/p CABG, prior DVT who was admitted 7/1 w/ dry gangrene. He underwent bypass w/ vascular surgery. Pt had a cough during hospitalization and ultimately a CT was done showing LLL mass concerning for primary bronchogenic carcinoma w/ L hilar/subcarinal LAD. Upon arrival, pt immediately states \"I dont want surgery.\" He denies SOB. No chest pain or tightness. No F/C. Cough w/ \"junk.\" No hemoptysis. Denies prior pulmonary history. Denies smoking history.     10 point review of systems negative, aside from that mentioned above    BP (!) 142/40 (BP Location: Left arm, Patient Position: Supine, Cuff Size: Adult Regular)   Pulse 58   Temp 98.2  F (36.8  C) (Oral)   Resp 18   Ht 1.829 m (6')   Wt 88.2 kg (194 lb 7.1 oz)   SpO2 94%   BMI 26.37 kg/m    Gen: NAD  HEENT: anicteric, OP clear  Card: RRR  Pulm: clear bilaterally   Abd: soft, NTND  MSK: no LE edema, no acute joint abnormalities  Skin: no obvious rash  Psych: normal affect  Neuro: answering questions appropriately     Labs: personally reviewed    Imaging: personally reviewed    Past Medical History:   Diagnosis Date    BPH (benign prostatic hyperplasia)     Coronary artery disease     s/p CABG    Iron deficiency anemia     Peripheral artery disease  "    S/p bilateral femoral to below-knee popliteal artery bypass graft       Past Surgical History:   Procedure Laterality Date    BYPASS GRAFT FEMORAL FEMORAL Bilateral 7/2/2025    Procedure: RIGHT COMMON FEMORAL TO LEFT PROFUNDUS FEMORAL BYPASS USING 6MMX 31MM ARTEGRAFT COLLAGEN VASCULAR GRAFT; LEFT PROFUNDUS FEMORAL ENDARTERECTOMY;  Surgeon: Alejandro Delgadillo MD;  Location: [facility] OR    VASCULAR SURGERY         History reviewed. No pertinent family history.    Social History     Socioeconomic History    Marital status: Single     Spouse name: Not on file    Number of children: Not on file    Years of education: Not on file    Highest education level: Not on file   Occupational History    Not on file   Tobacco Use    Smoking status: Former     Types: Cigarettes    Smokeless tobacco: Never   Vaping Use    Vaping status: Never Used   Substance and Sexual Activity    Alcohol use: Yes     Comment: beer once in a while    Drug use: Never    Sexual activity: Not on file   Other Topics Concern    Not on file   Social History Narrative    Not on file     Social Drivers of Health     Financial Resource Strain: Low Risk  (4/18/2025)    Financial Resource Strain     Within the past 12 months, have you or your family members you live with been unable to get utilities (heat, electricity) when it was really needed?: No   Food Insecurity: Low Risk  (4/18/2025)    Food Insecurity     Within the past 12 months, did you worry that your food would run out before you got money to buy more?: No     Within the past 12 months, did the food you bought just not last and you didn t have money to get more?: No   Transportation Needs: Low Risk  (4/18/2025)    Transportation Needs     Within the past 12 months, has lack of transportation kept you from medical appointments, getting your medicines, non-medical meetings or appointments, work, or from getting things that you need?: No   Physical Activity: Not on file   Stress: Not on file    Social Connections: Not on file   Interpersonal Safety: Low Risk  (4/19/2025)    Interpersonal Safety     Do you feel physically and emotionally safe where you currently live?: Yes     Within the past 12 months, have you been hit, slapped, kicked or otherwise physically hurt by someone?: No     Within the past 12 months, have you been humiliated or emotionally abused in other ways by your partner or ex-partner?: No   Housing Stability: Low Risk  (4/18/2025)    Housing Stability     Do you have housing? : Yes     Are you worried about losing your housing?: No       Rajan Duarn MD  Pulmonary and Critical Care Medicine  [facility]

## 2025-07-07 NOTE — PROGRESS NOTES
[facility]    Medicine Progress Note - Hospitalist Service    Date of Admission:  7/1/2025    Assessment & Plan   Angelica Maharaj is a 89 year old male with past medical history significant for peripheral artery disease s/p bilateral femoral to below-knee popliteal artery bypass, coronary artery disease s/p CABG, hx of DVT, cognitive impairment who presents with left foot pain, found to have dry gangrene and significant PAD, transferred to [facility] on 7/1/2025 for further evaluation and treatment.    Left foot cellulitis with dry gangrene and possible osteomyelitis   Peripheral artery disease s/p bilateral femoral to below-knee popliteal artery bypass  Status post Right common femoral to left profundus femoral Artegraft bypass on 07/02/25 by   Status post partial left great toe amputation on 07/05/25  Presented with progressive pain in left foot/toe.  CTA runoff with previously seen occlusions as noted in read, with partial obscured left femoral to below-knee popliteal artery bypass graft with apparent significant stenoses within the mid distal portions of the graft.  Left foot XR with cortical irregularity along the distal tuft of the distal phalanx of great toe, equivocal for osteomyelitis.  Case discussed with vascular surgery prior to transfer, recommended transfer for formal evaluation and discussion with patient/family regarding potential interventions.  - Patient was admitted for further evaluation and management.  - Vascular surgery and podiatry consulted, appreciate their assistance.  - S/p right common femoral to left profundus femoral artery graft bypass by  on 07/02/2025.  Per Vascular surgery, blood flow has been improved as much as possible to the left leg.  - ID consulted, appreciate their assistance.  Vancomycin was discontinued as MRSA nears PCR came back negative.  Continue IV Cefepime and Flagyl for now.  Appears that all infection was removed with  podiatry procedure, possibly discontinue antibiotics soon.  - Patient underwent MRI of left foot which was incomplete was able to show probable bone marrow edema in the tuft of the distal phalanx of the first toe and in the distal and middle phalanges of the third toe.  - S/p partial left great toe amputation on 7/5/25 by Dr. Phillips.  - Post-op management as directed by Vascular Surgery and Podiatry.  - OK for full weight bearing to left heel post-op.  - PTA Apixaban resumed on 7/5/25.  - PTA aspirin remains on hold, resume when OK with surgical services.  - Continue PTA atorvastatin.  - Continue pain medications with acetaminophen, oxycodone and IV hydromorphone which is available if pain is not controlled with p.o. medications.  - PT/OT consulted, recommending TCU.  - Care transitions consulted, assisting with discharge planning.  - Likely discharge to TCU in the next day or two when TCU bed available.  - Follow up with Podiatry in 1-2 weeks after discharge from the hospital.    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  X-ray showed interstitial prominence and possible atelectasis in the lower lungs prominent left hilar opacity which may represent enlarged pulmonary artery with CT was suggested to exclude mass/adenopathy.  - CT chest without contrast on 7/06 showed:  -Left lower lobe mass suspicious for primary bronchogenic carcinoma. Recommend follow-up PET/CT and/or tissue sampling.  -Left hilar/subcarinal lymphadenopathy, likely rafa metastatic disease.  -Macroscopic fat-containing tracheal nodule is indeterminate but could represent a tracheal lesion such as a hamartoma. Consider bronchoscopy.  -Small right and trace left pleural effusions.  -Mild nonspecific bilateral dependent consolidation.  -Few additional punctate pulmonary nodules are indeterminate.  Attention on follow-up.  - Chest CT results reviewed with patient, despite cognitive decline, patient was able to understand regarding the concern for possible bronchogenic carcinoma on CT scan.  According to patient, he does not wish any further testing or treatment.  Prior hospitalist discussed results with sammy SHAH.  After discussion, proceeded with pulmonary consultation.  - Pulmonology consulted on 7/7/25, appreciate their assistance.  Consider outpatient interventional pulmonary referral if consistent with goals of care.    Essential Hypertension  - Continue PTA metoprolol.  - Resume PTA lisinopril and hydrochlorothiazide today 07/06.  - Off IV fluids.  - Received one dose of IV lasix 20 mg X 1 on 07/05.  - BMP in AM.    History of DVT  Diagnosed 4/19/2025. US negative for DVT in ED.   - Restarted eliquis on evening of 7/5/25.    Coronary artery disease s/p CABG  - Medication management as above.    Chronic kidney disease, stage 2  Baseline creatinine 0.9-1. Creatinine mildly elevated at 1.20 on admission but now back to baseline.  - BMP on 7/6/25, showed normal electrolytes and creatinine.    BPH  - Continue PTA Doxazosin.     Cognitive impairment  Recent SLUMS of 8 per review of discharge summary 4/2025. No formal diagnosis of dementia per niece.   - Re-orient as needed.  - Maintain normal day/night, sleep wake cycles.  - Minimize sedating/altering medications as able.  - Treat separate conditions as detailed above/below.    Chronic normocytic anemia  Hx of iron deficiency anemia  Hgb 10.9 g/dl, at recent baseline.   - Monitor CBC intermittently, hemoglobin was stable around 9.7 on 07/05.  Recheck labs in AM.        Diet: Advance Diet as Tolerated: Regular Diet Adult  Room Service    DVT Prophylaxis: Pneumatic Compression Devices  Boyd Catheter: Not present  Lines: None     Cardiac Monitoring: None  Code Status: Full Code      Clinically Significant Risk Factors               # Hypoalbuminemia:  Lowest albumin = 3.3 g/dL at 7/1/2025 10:06 AM, will monitor as appropriate     # Hypertension: Noted on problem list            # Overweight: Estimated body mass index is 26.37 kg/m  as calculated from the following:    Height as of this encounter: 1.829 m (6').    Weight as of this encounter: 88.2 kg (194 lb 7.1 oz).      # Financial/Environmental Concerns:           Social Drivers of Health    Tobacco Use: Medium Risk (7/2/2025)    Patient History     Smoking Tobacco Use: Former     Smokeless Tobacco Use: Never          Disposition Plan   Medically Ready for Discharge: Anticipated Tomorrow, awaiting TCU placement.         Marshal Hall MD  Hospitalist Service  [facility]  Securely message with [employer] (more info)  Text page via [employer] Paging/Directory   ______________________________________________________________________    Interval History   Angelica Maharaj was seen today.  Has some discomfort in his left foot, would like more pain medication.  No other complaints/concerns.  Denies fevers, chest pain, shortness of breath, nausea, abdominal pain.  Plan of care discussed with bedside nurse.    Physical Exam   Vital Signs: Temp: 98.2  F (36.8  C) Temp src: Oral BP: 136/56 Pulse: 65   Resp: 16 SpO2: 93 % O2 Device: None (Room air)    Weight: 194 lbs 7.13 oz    Constitutional: awake, alert, cooperative, no apparent distress, laying in the hospital bed  Respiratory: no increased work of breathing, clear to auscultation bilaterally, no crackles or wheezing  Cardiovascular: regular rate and rhythm, normal S1 and S2, no murmur noted  GI: normal bowel sounds, soft, non-distended, non-tender  Skin: warm, dry  Musculoskeletal: no lower extremity pitting edema present  Neurologic: awake, alert, answers questions appropriately, moves all extremities    Medical Decision Making       40 MINUTES SPENT BY ME on the date of service doing chart review, history, exam, documentation & further activities  per the note.      Data   NOTE: Data reviewed over the past 24 hrs contributes toward MDM complexity

## 2025-07-07 NOTE — PROGRESS NOTES
[facility]    Infectious Disease Progress Note    Date of Service (when I saw the patient): 07/07/2025     Assessment & Plan   Angelica Maharaj is a 89 year old male who was admitted on 7/1/2025.     Impression:   89-year-old male with known peripheral vascular disease, prior revascularization now with worsening ischemia to his foot and dry gangrene, possible underlying osteomyelitis relatively minor cellulitis and no systemic toxicity  Peripheral vascular disease including probable active occlusion, now s/p angiogram with optimization of blood flow to foot.   Osteo left great toe s/p partial left great toe amputation 7/5/25. Pathology pending, but Podiatry feels all infection was resected.   Mild chronic kidney disease  Chronic cognitive impairment     Recommendations:  -Continue cefepime and Flagyl for now, then switch to Augmentin at discharge to complete 7 days total of antibiotics post-operatively (until 7/12/25).   -ID will sign off. Please call us back with questions.       Patient and plan discussed with Dr. Veras.       Eneida Hong PA-C    Interval History   Tolerating antibiotics ok   No new rashes or issues with antibiotics   Labs reviewed   No changes to past medical, social or family history   Complains of pain in foot.       Physical Exam   Temp: 98.2  F (36.8  C) Temp src: Oral BP: 136/56 Pulse: 65   Resp: 16 SpO2: 93 % O2 Device: None (Room air)    Vitals:    07/02/25 1548 07/03/25 0534 07/04/25 0525   Weight: 85.7 kg (189 lb) 82.1 kg (181 lb) 88.2 kg (194 lb 7.1 oz)     Vital Signs with Ranges  Temp:  [98.2  F (36.8  C)] 98.2  F (36.8  C)  Pulse:  [58-65] 65  Resp:  [16-18] 16  BP: (136-156)/(40-69) 136/56  SpO2:  [93 %-95 %] 93 %    Constitutional: Awake, alert, cooperative, no apparent distress  Lungs: Clear to auscultation bilaterally, no crackles or wheezing  Cardiovascular: Regular rate and rhythm, normal S1 and S2, and no murmur noted  Abdomen: Normal bowel sounds,  soft, non-distended, non-tender  Skin: No rashes, no cyanosis, left foot with dressing and boot.   Other:    Medications   Current Facility-Administered Medications   Medication Dose Route Frequency Provider Last Rate Last Admin    Patient is already receiving anticoagulation with heparin, enoxaparin (LOVENOX), warfarin (COUMADIN)  or other anticoagulant medication   Does not apply Continuous PRN Viki Phillips DPM, Podiatry/Foot and Ankle Surgery         Current Facility-Administered Medications   Medication Dose Route Frequency Provider Last Rate Last Admin    acetaminophen (TYLENOL) tablet 975 mg  975 mg Oral Q8H Viki Phillips DPM, Podiatry/Foot and Ankle Surgery   975 mg at 07/07/25 0902    apixaban ANTICOAGULANT (ELIQUIS) tablet 5 mg  5 mg Oral BID Kori Barksdale MD   5 mg at 07/07/25 0902    [Held by provider] aspirin EC tablet 81 mg  81 mg Oral Daily Markos Murray MD   81 mg at 07/04/25 0818    atorvastatin (LIPITOR) tablet 40 mg  40 mg Oral At Bedtime Viki Phillips DPM, Podiatry/Foot and Ankle Surgery   40 mg at 07/06/25 2003    ceFEPIme (MAXIPIME) 2 g vial to attach to  mL bag for ADULTS or NS 50 mL bag for PEDS  2 g Intravenous Q12H Viki Phillips DPM, Podiatry/Foot and Ankle Surgery 200 mL/hr at 07/02/25 2227 2 g at 07/07/25 1058    doxazosin (CARDURA) tablet 8 mg  8 mg Oral At Bedtime Viki Phillips DPM, Podiatry/Foot and Ankle Surgery   8 mg at 07/06/25 2003    hydroCHLOROthiazide tablet 12.5 mg  12.5 mg Oral Daily Kori Barksdale MD   12.5 mg at 07/07/25 0902    lisinopril (ZESTRIL) tablet 15 mg  15 mg Oral Daily Kori Barksdale MD   15 mg at 07/07/25 0901    metoprolol tartrate (LOPRESSOR) tablet 25 mg  25 mg Oral BID Kori Barksdale MD   25 mg at 07/06/25 2003    metroNIDAZOLE (FLAGYL) infusion 500 mg  500 mg Intravenous Q8H Viki Phillips DPM, Podiatry/Foot and Ankle Surgery   500 mg at 07/07/25 0953    polyethylene glycol (MIRALAX) Packet 17 g  17 g Oral Daily Viki Phillips DPM,  Podiatry/Foot and Ankle Surgery        senna-docusate (SENOKOT-S/PERICOLACE) 8.6-50 MG per tablet 1 tablet  1 tablet Oral BID Viki Phillips DPM, Podiatry/Foot and Ankle Surgery   1 tablet at 07/05/25 0943    sodium chloride (PF) 0.9% PF flush 3 mL  3 mL Intracatheter Q8H Viki Phillips DPM, Podiatry/Foot and Ankle Surgery   3 mL at 07/07/25 0903       Data   All microbiology laboratory data reviewed.  Recent Labs   Lab Test 07/05/25  1132 07/03/25  0911 07/02/25  0757   WBC 8.0 8.1 7.2   HGB 9.7* 9.0* 10.1*   HCT 30.3* 28.4* 32.3*   MCV 85 84 84    198 215     Recent Labs   Lab Test 07/06/25  0725 07/05/25  1132 07/04/25  0721   CR 1.01 0.97 1.06     07/02/2025 0208 07/02/2025 0543 MRSA MSSA PCR, Nasal Swab [[id number]]    Swab from Nares, Bilateral    Final result Component Value   MRSA Target DNA Negative   SA Target DNA Positive          07/01/2025 1006 07/06/2025 1346 Blood Culture Peripheral blood (BC) Hand, Right [[id number]]   Peripheral blood (BC) from Hand, Right    Final result Component Value   Culture No Growth             07/01/2025 1006 07/06/2025 1346 Blood Culture Peripheral blood (BC) Hand, Left [[id number]]   Peripheral blood (BC) from Hand, Left    Final result Component Value   Culture No Growth

## 2025-07-07 NOTE — PROGRESS NOTES
VASCULAR SURGERY PROGRESS NOTE    Subjective:  Pain well controlled, resting comfortably in bed. No acute events overnight.     Objective:  Intake/Output Summary (Last 24 hours) at 7/7/2025 0716  Last data filed at 7/7/2025 0055  Gross per 24 hour   Intake 230 ml   Output --   Net 230 ml     PHYSICAL EXAM:  BP (!) 142/40 (BP Location: Left arm, Patient Position: Supine, Cuff Size: Adult Regular)   Pulse 58   Temp 98.2  F (36.8  C) (Oral)   Resp 18   Ht 1.829 m (6')   Wt 88.2 kg (194 lb 7.1 oz)   SpO2 94%   BMI 26.37 kg/m    NAD, sitting up comfortably in chair  RRR by peripheral pulse, NLB on RA  Palpable pulse and biphasic signal along fem-fem bypass with some expected post-op bruising/swelling which is resolving.  RLE WWP, strong monophasic DP/PT  LLE with dressings in place over toe amputation site. Monophasic doppler DP/PT.    ASSESSMENT:  POD 5 s/p right to left fem-fem bypass with Artegraft for left 1st and 3rd toe dry gangrene, POD 2 s/p partial left hallux amputation, reportedly with minimal bleeding. We have discussed with the patient and his niece that his blood flow is as improved as it can get in the left leg, and that he is not a good candidate for any further revascularization. The hope is that his blood supply is sufficient enough to at least heal his amputation incision.          PLAN:  - Continue doppler checks to LLE and along bypass graft  - Recommend encouraging pulmonary toilet  - Further care per primary team  - Will continue to follow   -concern for newly diagnosed primary bronchogenic carcinoma    Discussed pt history, exam, assessment and plan with Dr. Delgadillo of the vascular surgery service, who is in agreement with the above.    Francesca Andrews NP  VASCULAR SURGERY

## 2025-07-07 NOTE — PLAN OF CARE
Diagnosis: Right/Left fem bypass & left great toe partial amp  POD#: 5/0  Mental Status: A&O x 3  Activity/dangle- up with SBA  Diet: regular - poor intake  Pain: oxycodone  Boyd/Voiding: voiding BR  Tele/Restraints/Iso: NA  02/LDA: SL x 2  D/C Date: ? Tomorrow   Other Info: HWB with post op shoe,

## 2025-07-08 ENCOUNTER — APPOINTMENT (OUTPATIENT)
Dept: OCCUPATIONAL THERAPY | Facility: CLINIC | Age: OVER 89
DRG: 253 | End: 2025-07-08
Attending: HOSPITALIST
Payer: COMMERCIAL

## 2025-07-08 LAB
ANION GAP SERPL CALCULATED.3IONS-SCNC: 9 MMOL/L (ref 7–15)
BUN SERPL-MCNC: 13.4 MG/DL (ref 8–23)
CALCIUM SERPL-MCNC: 8.6 MG/DL (ref 8.8–10.4)
CHLORIDE SERPL-SCNC: 104 MMOL/L (ref 98–107)
CREAT SERPL-MCNC: 0.88 MG/DL (ref 0.67–1.17)
EGFRCR SERPLBLD CKD-EPI 2021: 81 ML/MIN/1.73M2
ERYTHROCYTE [DISTWIDTH] IN BLOOD BY AUTOMATED COUNT: 15.7 % (ref 10–15)
GLUCOSE SERPL-MCNC: 95 MG/DL (ref 70–99)
HCO3 SERPL-SCNC: 25 MMOL/L (ref 22–29)
HCT VFR BLD AUTO: 29.6 % (ref 40–53)
HGB BLD-MCNC: 9.4 G/DL (ref 13.3–17.7)
MCH RBC QN AUTO: 26.8 PG (ref 26.5–33)
MCHC RBC AUTO-ENTMCNC: 31.8 G/DL (ref 31.5–36.5)
MCV RBC AUTO: 84 FL (ref 78–100)
PLATELET # BLD AUTO: 225 10E3/UL (ref 150–450)
POTASSIUM SERPL-SCNC: 4 MMOL/L (ref 3.4–5.3)
RBC # BLD AUTO: 3.51 10E6/UL (ref 4.4–5.9)
SODIUM SERPL-SCNC: 138 MMOL/L (ref 135–145)
WBC # BLD AUTO: 6 10E3/UL (ref 4–11)

## 2025-07-08 PROCEDURE — 250N000013 HC RX MED GY IP 250 OP 250 PS 637: Performed by: INTERNAL MEDICINE

## 2025-07-08 PROCEDURE — 250N000013 HC RX MED GY IP 250 OP 250 PS 637: Performed by: PHYSICIAN ASSISTANT

## 2025-07-08 PROCEDURE — 250N000013 HC RX MED GY IP 250 OP 250 PS 637: Performed by: PODIATRIST

## 2025-07-08 PROCEDURE — 250N000011 HC RX IP 250 OP 636: Performed by: PODIATRIST

## 2025-07-08 PROCEDURE — 80048 BASIC METABOLIC PNL TOTAL CA: CPT | Performed by: HOSPITALIST

## 2025-07-08 PROCEDURE — 99232 SBSQ HOSP IP/OBS MODERATE 35: CPT | Performed by: HOSPITALIST

## 2025-07-08 PROCEDURE — 97535 SELF CARE MNGMENT TRAINING: CPT | Mod: GO | Performed by: OCCUPATIONAL THERAPIST

## 2025-07-08 PROCEDURE — 120N000001 HC R&B MED SURG/OB

## 2025-07-08 PROCEDURE — 36415 COLL VENOUS BLD VENIPUNCTURE: CPT | Performed by: HOSPITALIST

## 2025-07-08 PROCEDURE — 85027 COMPLETE CBC AUTOMATED: CPT | Performed by: HOSPITALIST

## 2025-07-08 RX ADMIN — METRONIDAZOLE 500 MG: 500 TABLET ORAL at 08:20

## 2025-07-08 RX ADMIN — CEFEPIME 2 G: 2 INJECTION, POWDER, FOR SOLUTION INTRAVENOUS at 10:40

## 2025-07-08 RX ADMIN — METOPROLOL TARTRATE 25 MG: 25 TABLET, FILM COATED ORAL at 08:20

## 2025-07-08 RX ADMIN — ATORVASTATIN CALCIUM 40 MG: 40 TABLET, FILM COATED ORAL at 21:11

## 2025-07-08 RX ADMIN — APIXABAN 5 MG: 5 TABLET, FILM COATED ORAL at 08:20

## 2025-07-08 RX ADMIN — ACETAMINOPHEN 975 MG: 325 TABLET ORAL at 08:20

## 2025-07-08 RX ADMIN — DOXAZOSIN 8 MG: 4 TABLET ORAL at 21:11

## 2025-07-08 RX ADMIN — METRONIDAZOLE 500 MG: 500 TABLET ORAL at 21:11

## 2025-07-08 RX ADMIN — APIXABAN 5 MG: 5 TABLET, FILM COATED ORAL at 21:11

## 2025-07-08 RX ADMIN — CEFEPIME 2 G: 2 INJECTION, POWDER, FOR SOLUTION INTRAVENOUS at 23:30

## 2025-07-08 RX ADMIN — OXYCODONE HYDROCHLORIDE 10 MG: 5 TABLET ORAL at 17:57

## 2025-07-08 RX ADMIN — LISINOPRIL 15 MG: 10 TABLET ORAL at 08:20

## 2025-07-08 RX ADMIN — HYDROCHLOROTHIAZIDE 12.5 MG: 12.5 TABLET ORAL at 08:20

## 2025-07-08 RX ADMIN — OXYCODONE HYDROCHLORIDE 10 MG: 5 TABLET ORAL at 13:06

## 2025-07-08 RX ADMIN — ACETAMINOPHEN 975 MG: 325 TABLET ORAL at 17:57

## 2025-07-08 ASSESSMENT — ACTIVITIES OF DAILY LIVING (ADL)
ADLS_ACUITY_SCORE: 65

## 2025-07-08 NOTE — PROGRESS NOTES
VASCULAR SURGERY PROGRESS NOTE    Patient resting comfortably in bed this morning. No complaints, pain well controlled. No acute events overnight    Breathing room air  Palpable fem-fem graft   LLE dressings in place    A/P:  POD 6 s/p right to left fem-fem bypass with Artegraft for left 1st and 3rd toe dry gangrene, POD 3 s/p partial left hallux amputation, reportedly with minimal bleeding. We have discussed with the patient and his niece that his blood flow is as improved as it can get in the left leg, and that he is not a good candidate for any further revascularization. The hope is that his blood supply is sufficient enough to at least heal his amputation incision.     - Continue doppler checks to LLE and along bypass graft  - continue Eliquis and statin  - Recommend encouraging pulmonary toilet  - Further care per primary team  - Will follow peripherally     Discussed with vascular fellow and staff.    Lesley Diaz PA-C  Vascular Surgery

## 2025-07-08 NOTE — PLAN OF CARE
Diagnosis: Fem bypass, bilateral groin, left toe part amp  POD#:6/3  Mental Status: A&O x 3  Activity/dangle up with 1, WB to LLE with post op shoe  Diet: regular  Pain: oxycodone  Boyd/Voiding: voiding BR  Tele/Restraints/Iso: NA  02/LDA: DI  D/C Date: ? Tomorrow back to [place]  Other Info: EOD dressing changes LLE

## 2025-07-08 NOTE — PROGRESS NOTES
[facility]    Medicine Progress Note - Hospitalist Service    Date of Admission:  7/1/2025    Assessment & Plan   Angelica Maharaj is a 89 year old male with past medical history significant for peripheral artery disease s/p bilateral femoral to below-knee popliteal artery bypass, coronary artery disease s/p CABG, hx of DVT, cognitive impairment who presents with left foot pain, found to have dry gangrene and significant PAD, transferred to [facility] on 7/1/2025 for further evaluation and treatment.    Left foot cellulitis with dry gangrene and possible osteomyelitis   Peripheral artery disease s/p bilateral femoral to below-knee popliteal artery bypass  Status post Right common femoral to left profundus femoral Artegraft bypass on 07/02/25 by   Status post partial left great toe amputation on 07/05/25  Presented with progressive pain in left foot/toe.  CTA runoff with previously seen occlusions as noted in read, with partial obscured left femoral to below-knee popliteal artery bypass graft with apparent significant stenoses within the mid distal portions of the graft.  Left foot XR with cortical irregularity along the distal tuft of the distal phalanx of great toe, equivocal for osteomyelitis.  Case discussed with vascular surgery prior to transfer, recommended transfer for formal evaluation and discussion with patient/family regarding potential interventions.  - Patient was admitted for further evaluation and management.  - Vascular surgery and podiatry consulted, appreciate their assistance.  - S/p right common femoral to left profundus femoral artery graft bypass by  on 07/02/2025.  Per Vascular surgery, blood flow has been improved as much as possible to the left leg.  - ID consulted, appreciate their assistance.  Vancomycin was discontinued as MRSA nears PCR came back negative.  Continue IV Cefepime and Flagyl for now.  Appears that all infection was removed with  podiatry procedure, possibly discontinue antibiotics soon.  - Patient underwent MRI of left foot which was incomplete was able to show probable bone marrow edema in the tuft of the distal phalanx of the first toe and in the distal and middle phalanges of the third toe.  - S/p partial left great toe amputation on 7/5/25 by Dr. Phillips.  - Post-op management as directed by Vascular Surgery and Podiatry.  - OK for full weight bearing to left heel post-op.  - PTA Apixaban resumed on 7/5/25.  - PTA aspirin remains on hold, resume when OK with surgical services.  - Continue PTA atorvastatin.  - Continue pain medications with acetaminophen, oxycodone and IV hydromorphone which is available if pain is not controlled with p.o. medications.  - PT/OT consulted, recommending TCU.  - Care transitions consulted, assisting with discharge planning.  - Plan for discharge back to [facility] with hospice tomorrow.    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  X-ray showed interstitial prominence and possible atelectasis in the lower lungs prominent left hilar opacity which may represent enlarged pulmonary artery with CT was suggested to exclude mass/adenopathy.  - CT chest without contrast on 7/06 showed:  -Left lower lobe mass suspicious for primary bronchogenic carcinoma. Recommend follow-up PET/CT and/or tissue sampling.  -Left hilar/subcarinal lymphadenopathy, likely rafa metastatic disease.  -Macroscopic fat-containing tracheal nodule is indeterminate but could represent a tracheal lesion such as a hamartoma. Consider bronchoscopy.  -Small right and trace left pleural effusions.  -Mild nonspecific bilateral dependent consolidation.  -Few additional punctate pulmonary nodules are indeterminate. Attention on follow-up.  - Chest CT results reviewed with patient, despite  cognitive decline, patient was able to understand regarding the concern for possible bronchogenic carcinoma on CT scan.  According to patient, he does not wish any further testing or treatment.  Prior hospitalist discussed results with samym SHAH.  After discussion, proceeded with pulmonary consultation.  - Pulmonology consulted on 7/7/25, appreciate their assistance.  Recommended that they consider outpatient interventional pulmonary referral if consistent with goals of care, however patient will be transitioning to hospice at the time of discharge.    Essential Hypertension  - Continue PTA metoprolol.  - Resumed PTA lisinopril and hydrochlorothiazide today 07/06.  - Off IV fluids.  - Received one dose of IV lasix 20 mg X 1 on 07/05.    History of DVT  Diagnosed 4/19/2025. US negative for DVT in ED.   - Restarted eliquis on evening of 7/5/25.    Coronary artery disease s/p CABG  - Medication management as above.    Chronic kidney disease, stage 2  Baseline creatinine 0.9-1. Creatinine mildly elevated at 1.20 on admission but now back to baseline.  - Creatinine stable at 0.88 on 7/8/25.    BPH  - Continue PTA Doxazosin.     Cognitive impairment  Recent SLUMS of 8 per review of discharge summary 4/2025. No formal diagnosis of dementia per niece.   - Re-orient as needed.  - Maintain normal day/night, sleep wake cycles.  - Minimize sedating/altering medications as able.  - Treat separate conditions as detailed above/below.    Chronic normocytic anemia  Hx of iron deficiency anemia  Hgb 10.9 g/dl, at recent baseline.   - Monitor CBC intermittently, hemoglobin was stable at 9.4 on 7/8/25.        Diet: Advance Diet as Tolerated: Regular Diet Adult  Room Service    DVT Prophylaxis: Pneumatic Compression Devices  Boyd Catheter: Not present  Lines: None     Cardiac Monitoring: None  Code Status: Full Code      Clinically Significant Risk Factors               # Hypoalbuminemia: Lowest albumin = 3.3 g/dL at 7/1/2025  10:06 AM, will monitor as appropriate     # Hypertension: Noted on problem list            # Overweight: Estimated body mass index is 26.37 kg/m  as calculated from the following:    Height as of this encounter: 1.829 m (6').    Weight as of this encounter: 88.2 kg (194 lb 7.1 oz).      # Financial/Environmental Concerns:           Social Drivers of Health    Tobacco Use: Medium Risk (7/2/2025)    Patient History     Smoking Tobacco Use: Former     Smokeless Tobacco Use: Never          Disposition Plan     Medically Ready for Discharge: Anticipated Tomorrow         Marshal Hall MD  Hospitalist Service  [facility]  Securely message with [employer] (more info)  Text page via [facility] Paging/Directory   ______________________________________________________________________    Interval History   Angelica Maharaj was seen today.  He feels OK.  Some discomfort in his left foot, would like some pain medication.  No other complaints/concerns.  Plan of care discussed with bedside nurse and social work.    Physical Exam   Vital Signs: Temp: 97.7  F (36.5  C) Temp src: Oral BP: (!) 145/42 Pulse: 74   Resp: 18 SpO2: 96 % O2 Device: None (Room air)    Weight: 194 lbs 7.13 oz    Constitutional: awake, alert, cooperative, no apparent distress, laying in the hospital bed  Respiratory: no increased work of breathing, clear to auscultation bilaterally, no crackles or wheezing  Cardiovascular: regular rate and rhythm, normal S1 and S2, no murmur noted  GI: normal bowel sounds, soft, non-distended, non-tender  Skin: warm, dry, dressing in place on left foot  Musculoskeletal: no lower extremity pitting edema present  Neurologic: awake, alert, answers questions appropriately, moves all extremities    Medical Decision Making       40 MINUTES SPENT BY ME on the date of service doing chart review, history, exam, documentation & further activities per the note.      Data     I have personally reviewed the following  data over the past 24 hrs:    6.0  \   9.4 (L)   / 225     138 104 13.4 /  95   4.0 25 0.88 \

## 2025-07-08 NOTE — PROGRESS NOTES
Care Management Follow Up    Length of Stay (days): 7    Expected Discharge Date: 07/08/2025     Concerns to be Addressed: discharge planning     Patient plan of care discussed at interdisciplinary rounds: Yes    Anticipated Discharge Disposition:                Anticipated Discharge Services:    Anticipated Discharge DME:      Patient/family educated on Medicare website which has current facility and service quality ratings:    Education Provided on the Discharge Plan:    Patient/Family in Agreement with the Plan:      Referrals Placed by CM/SW:    Private pay costs discussed: private room/amenity fees and transportation costs    Discussed  Partnership in Safe Discharge Planning  document with patient/family: Yes:      Handoff Completed: No, handoff not indicated or clinically appropriate    Additional Information:  Call to patient's niece Donita. She confirmed that she's been talking to Yan at [facility] [place] about [place] utilizing [place] and returning there on hospice. Writer asked about agency and Donita couldn't remember but said that Yan can be reached at [phone]. Patient will need a ride scheduled when he's ready to go. Has [employer] so waiting for confirmation from facility.     Call placed to Yan to see if they'd like DERRICK to send a referral to hospice or if they planned to take care of enrollment when he returns.    1600: Patient accepted to [facility] on hospice. Due to his VA benefits no prior auth needed. Writer called and set up a stretcher ride for tomorrow between [id number]. PCS completed for supervision due to some confusion and submitted. Call to his niece Donita to update and she is in agreement for this plan.     Yan at [facility] returned call and noted that Admit to Hospice needs to be added to the discharge orders tomorrow.    Next Steps: Update Donita with any changes tomorrow    YASIR Floyd

## 2025-07-08 NOTE — PROGRESS NOTES
"BRIEF NUTRITION ASSESSMENT      REASON FOR ASSESSMENT:  Angelica Maharaj is a 89 year old male seen by Registered Dietitian for LOS    NUTRITION HISTORY:  Patient reports \"good\" intake PTA, stating he was eating better PTA than he is currently. Patient comes from home alone per care coordinator's notes. Patient did not detail his intakes, stating \"come back when I am more awake.\" Reports no changes in weight PTA.    CURRENT DIET AND INTAKE:  Diet: Regular  Snacks/Supplements: Ensure Plus at Lunch    CURRENT INTAKE/TOLERANCE  25-50% of 2-3 meals daily per I/O flowsheets, per [employer] [facility] room service meal tray ticket review, and per pt and/or family reports  Patient states the \"food is sh*t\" and he doesn't like it which is why he isn't eating a lot during admission. Willing to trial Ensure supplement to optimize intakes.     ANTHROPOMETRICS:  Height: 1.829 m (6')  Admission Weight: 85.7 kg (189 lb) (07/02/25 1548)   Most Recent Weight: 88.2 kg (194 lb 7.1 oz)  IBW: 80.9 kg  %IBW: 106%  Body mass index is 26.37 kg/m . Overweight BMI 25-29.9  Weight History: Limited weight history available  Wt Readings from Last 10 Encounters:   07/04/25 88.2 kg (194 lb 7.1 oz)   07/01/25 85.7 kg (189 lb)   04/18/25 85.4 kg (188 lb 4.4 oz)       LABS:  Labs noted    MALNUTRITION:  Patient does not meet two of the following criteria necessary for diagnosing malnutrition.     % Weight Loss:  None noted  % Intake:  No decreased intake noted  Subcutaneous Fat Loss:  Age related losses  Muscle Loss:  Age related losses  Fluid Retention:  None noted    NUTRITION INTERVENTION:  Nutrition Diagnosis:  No nutrition diagnosis at this time.    Implementation:  Encouraged patient to order familiar foods or have family/friends bring in preferred foods.   Will trial Ensure supplement to optimize intakes.     FOLLOW UP/MONITORING:   Will re-evaluate in 7 - 10 days, or sooner, if re-consulted.      Hiren Russell RD, LD, MS  [facility] " Coverage  Available on [employer]

## 2025-07-08 NOTE — PLAN OF CARE
Goal Outcome Evaluation:    Pt A&Ox3. VSS on RA. Disoriented to situation. Up with assist of 1 with gait belt  and walker with post op shoe.  Voiding adequately, Tylenol given for pain.

## 2025-07-09 VITALS
HEART RATE: 56 BPM | HEIGHT: 72 IN | WEIGHT: 194.45 LBS | TEMPERATURE: 98.3 F | SYSTOLIC BLOOD PRESSURE: 141 MMHG | BODY MASS INDEX: 26.34 KG/M2 | DIASTOLIC BLOOD PRESSURE: 38 MMHG | RESPIRATION RATE: 18 BRPM | OXYGEN SATURATION: 96 %

## 2025-07-09 PROCEDURE — 250N000013 HC RX MED GY IP 250 OP 250 PS 637: Performed by: INTERNAL MEDICINE

## 2025-07-09 PROCEDURE — 99239 HOSP IP/OBS DSCHRG MGMT >30: CPT | Performed by: HOSPITALIST

## 2025-07-09 PROCEDURE — 250N000013 HC RX MED GY IP 250 OP 250 PS 637: Performed by: PODIATRIST

## 2025-07-09 PROCEDURE — 250N000011 HC RX IP 250 OP 636: Performed by: PODIATRIST

## 2025-07-09 PROCEDURE — 250N000013 HC RX MED GY IP 250 OP 250 PS 637: Performed by: PHYSICIAN ASSISTANT

## 2025-07-09 RX ORDER — OXYCODONE HYDROCHLORIDE 5 MG/1
5-10 TABLET ORAL EVERY 4 HOURS PRN
Qty: 20 TABLET | Refills: 0 | Status: SHIPPED | OUTPATIENT
Start: 2025-07-09

## 2025-07-09 RX ORDER — ACETAMINOPHEN 325 MG/1
975 TABLET ORAL EVERY 8 HOURS
DISCHARGE
Start: 2025-07-09

## 2025-07-09 RX ORDER — AMOXICILLIN 250 MG
1 CAPSULE ORAL 2 TIMES DAILY PRN
DISCHARGE
Start: 2025-07-09

## 2025-07-09 RX ADMIN — METRONIDAZOLE 500 MG: 500 TABLET ORAL at 08:08

## 2025-07-09 RX ADMIN — CEFEPIME 2 G: 2 INJECTION, POWDER, FOR SOLUTION INTRAVENOUS at 11:15

## 2025-07-09 RX ADMIN — APIXABAN 5 MG: 5 TABLET, FILM COATED ORAL at 08:08

## 2025-07-09 RX ADMIN — HYDROCHLOROTHIAZIDE 12.5 MG: 12.5 TABLET ORAL at 08:09

## 2025-07-09 RX ADMIN — LISINOPRIL 15 MG: 10 TABLET ORAL at 08:08

## 2025-07-09 RX ADMIN — ACETAMINOPHEN 975 MG: 325 TABLET ORAL at 08:13

## 2025-07-09 RX ADMIN — OXYCODONE HYDROCHLORIDE 10 MG: 5 TABLET ORAL at 11:15

## 2025-07-09 RX ADMIN — METOPROLOL TARTRATE 25 MG: 25 TABLET, FILM COATED ORAL at 08:09

## 2025-07-09 ASSESSMENT — ACTIVITIES OF DAILY LIVING (ADL)
ADLS_ACUITY_SCORE: 65

## 2025-07-09 NOTE — PROGRESS NOTES
Vascular Surgery Progress Note    No acute events overnight, enrolling in hospice on [place] [facility] [place] which is scheduled for return today.     Will do virtual follow-up with Theodor in approximately 2 weeks.     Ok to discharge from our standpoint.       Francesca Andrews, CNP

## 2025-07-09 NOTE — PLAN OF CARE
Occupational Therapy Discharge Summary    Reason for therapy discharge:    No further expectations of functional progress.    Progress towards therapy goal(s). See goals on Care Plan in [facility] electronic health record for goal details.  Goals partially met.  Barriers to achieving goals:   limited tolerance for therapy and discharge from facility.    Therapy recommendation(s):    No further therapy is recommended. Patient is discharging to [facility] on hospice, no further OT required.

## 2025-07-09 NOTE — DISCHARGE SUMMARY
[facility]  Hospitalist Discharge Summary      Date of Admission:  7/1/2025  Date of Discharge:  7/9/2025  Discharging Provider: Marshal Hall MD  Discharge Service: Hospitalist Service    Discharge Diagnoses   Goals of care - plan for transition to hospice upon discharge  Left foot cellulitis with dry gangrene and possible osteomyelitis   Peripheral artery disease s/p bilateral femoral to below-knee popliteal artery bypass  Status post Right common femoral to left profundus femoral Artegraft bypass on 07/02/25 by   Status post partial left great toe amputation on 07/05/25  Newly diagnosed left lower lobe mass suspicious for primary bronchogenic carcinoma  Essential Hypertension  History of DVT  Coronary artery disease s/p CABG  Chronic kidney disease, stage 2  BPH  Cognitive impairment  Chronic normocytic anemia  Hx of iron deficiency anemia    Clinically Significant Risk Factors     # Overweight: Estimated body mass index is 26.37 kg/m  as calculated from the following:    Height as of this encounter: 1.829 m (6').    Weight as of this encounter: 88.2 kg (194 lb 7.1 oz).       Follow-ups Needed After Discharge   Follow-up Appointments       Follow Up      Follow up with Dr. Phillips in 2 weeks from discharge from hospital.         For appointments, questions or concerns: call: [phone]    Office fax number:  [phone]        Follow Up and recommended labs and tests      Follow up with Nursing home physician.  No follow up labs or test are needed.  Admit to hospice.              Unresulted Labs Ordered in the Past 30 Days of this Admission       Date and Time Order Name Status Description    7/5/2025  7:53 AM Surgical Pathology Exam In process         These results will be followed up by Podiatry.    Discharge Disposition   Discharged to long-term care facility with plan to admit to hospice.  Condition at discharge: [facility] Course   Angelica Maharaj is a 89 year old  male with past medical history significant for peripheral artery disease s/p bilateral femoral to below-knee popliteal artery bypass, coronary artery disease s/p CABG, hx of DVT, cognitive impairment who presents with left foot pain, found to have dry gangrene and significant PAD, transferred to [facility] on 7/1/2025 for further evaluation and treatment.    Left foot cellulitis with dry gangrene and possible osteomyelitis   Peripheral artery disease s/p bilateral femoral to below-knee popliteal artery bypass  Status post Right common femoral to left profundus femoral Artegraft bypass on 07/02/25 by   Status post partial left great toe amputation on 07/05/25  Goals of care - plan for transition to hospice upon discharge  Presented with progressive pain in left foot/toe.  CTA runoff with previously seen occlusions as noted in read, with partial obscured left femoral to below-knee popliteal artery bypass graft with apparent significant stenoses within the mid distal portions of the graft.  Left foot XR with cortical irregularity along the distal tuft of the distal phalanx of great toe, equivocal for osteomyelitis.  Case discussed with vascular surgery prior to transfer, recommended transfer for formal evaluation and discussion with patient/family regarding potential interventions.  - Patient was admitted for further evaluation and management.  - Vascular surgery and podiatry consulted, appreciate their assistance.  - S/p right common femoral to left profundus femoral artery graft bypass by  on 07/02/2025.  Per Vascular surgery, blood flow has been improved as much as possible to the left leg.  - ID consulted, appreciate their assistance.  Vancomycin was discontinued as MRSA nears PCR came back negative.  Continue IV Cefepime and Flagyl for now.  Appears that all infection was removed with podiatry procedure, possibly discontinue antibiotics soon.  - Patient underwent MRI of left foot which was  incomplete was able to show probable bone marrow edema in the tuft of the distal phalanx of the first toe and in the distal and middle phalanges of the third toe.  - S/p partial left great toe amputation on 7/5/25 by Dr. Phillips.  - Post-op management as directed by Vascular Surgery and Podiatry.  - OK for full weight bearing to left heel post-op.  - PTA Apixaban resumed on 7/5/25.  - PTA aspirin was held, resume upon discharge.  - Continue PTA atorvastatin.  - Continue pain medications with acetaminophen and PRN oxycodone.  - PT/OT consulted, recommended TCU.  - Care transitions consulted, assisted with discharge planning.  - Goals of care were discussed with patient and his niece Donita.  Given his noted co-morbidities, suspicion for new lung cancer, cognitive impairment, etc. would like to transition to comfort cares/hospice at the time of discharge.  Arrangements have been made for discharge back to [facility] with plans for him to enroll in hospice.  Will continue current medications upon discharge for now, can be re-evaluated over time by hospice depending on his clinical course.    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  X-ray showed interstitial prominence and possible atelectasis in the lower lungs prominent left hilar opacity which may represent enlarged pulmonary artery with CT was suggested to exclude mass/adenopathy.  - CT chest without contrast on 7/06 showed:  -Left lower lobe mass suspicious for primary bronchogenic carcinoma. Recommend follow-up PET/CT and/or tissue sampling.  -Left hilar/subcarinal lymphadenopathy, likely rafa metastatic disease.  -Macroscopic fat-containing tracheal nodule is indeterminate but could represent a tracheal lesion such as a hamartoma. Consider bronchoscopy.  -Small right and trace left pleural  effusions.  -Mild nonspecific bilateral dependent consolidation.  -Few additional punctate pulmonary nodules are indeterminate. Attention on follow-up.  - Chest CT results reviewed with patient, despite cognitive decline, patient was able to understand regarding the concern for possible bronchogenic carcinoma on CT scan.  According to patient, he does not wish any further testing or treatment.  Prior hospitalist discussed results with sammy SHAH.  After discussion, proceeded with pulmonary consultation.  - Pulmonology consulted on 7/7/25, appreciate their assistance.  Recommended that they consider outpatient interventional pulmonary referral if consistent with goals of care, however patient will be transitioning to hospice at the time of discharge.    Essential Hypertension  - Continue PTA metoprolol.  - Resumed PTA lisinopril and hydrochlorothiazide today 07/06.  - Off IV fluids.  - Received one dose of IV lasix 20 mg X 1 on 07/05.    History of DVT  Diagnosed 4/19/2025. US negative for DVT in ED.   - Restarted eliquis on evening of 7/5/25.    Coronary artery disease s/p CABG  - Medication management as above.    Chronic kidney disease, stage 2  Baseline creatinine 0.9-1. Creatinine mildly elevated at 1.20 on admission but now back to baseline.  - Creatinine stable at 0.88 on 7/8/25.    BPH  - Continue PTA Doxazosin.     Cognitive impairment  Recent SLUMS of 8 per review of discharge summary 4/2025. No formal diagnosis of dementia per niece.   - Re-orient as needed.  - Maintain normal day/night, sleep wake cycles.  - Minimize sedating/altering medications as able.  - Treat separate conditions as detailed above/below.    Chronic normocytic anemia  Hx of iron deficiency anemia  Hgb 10.9 g/dl on 7/1/25, at recent baseline.   - Hemoglobin was stable at 9.4 on 7/8/25.    Consultations This Hospital Stay   VASCULAR SURGERY IP CONSULT  PODIATRY IP CONSULT  PHARMACY TO DOSE VANCO  CARE MANAGEMENT / SOCIAL WORK IP  CONSULT  INFECTIOUS DISEASES IP CONSULT  CARE MANAGEMENT / SOCIAL WORK IP CONSULT  OCCUPATIONAL THERAPY ADULT IP CONSULT  PHYSICAL THERAPY ADULT IP CONSULT  PHYSICAL THERAPY ADULT IP CONSULT  PULMONARY IP CONSULT    Code Status   No CPR- Do NOT Intubate    Time Spent on this Encounter   I, Marshal Hall MD, personally saw the patient today and spent greater than 30 minutes discharging this patient.       Marshal Hall MD  [facility]  [address] [place] [id number]  Phone: [phone]  Fax: [phone]  ______________________________________________________________________    Physical Exam   Vital Signs: Temp: 98.3  F (36.8  C) Temp src: Oral BP: (!) 141/38 Pulse: 56   Resp: 18 SpO2: 96 % O2 Device: None (Room air)    Weight: 194 lbs 7.13 oz  Constitutional: awake, alert, cooperative, no apparent distress, laying in the hospital bed  Respiratory: no increased work of breathing, clear to auscultation bilaterally, no crackles or wheezing  Cardiovascular: regular rate and rhythm, normal S1 and S2, no murmur noted  GI: normal bowel sounds, soft, non-distended, non-tender  Skin: warm, dry, dressing in place on left foot  Musculoskeletal: no lower extremity pitting edema present  Neurologic: awake, alert, answers questions appropriately, moves all extremities       Primary Care Physician   [facility]    Discharge Orders      Activity    Your activity upon discharge:     Minimal weightbearing in postop shoe left foot.     Follow Up    Follow up with Dr. Phillips in 2 weeks from discharge from hospital.         For appointments, questions or concerns: call: [phone]    Office fax number:  [phone]     Wound care and dressings    Instructions to care for your wound at home:      left foot:  3x a week dressing change:  Keep foot and dressing dry. Bag it up if you shower.    For Dressings:  1. Apply iodine/betadine to incision area and left 3rd toe  ulcer.  2. Apply 4x4 gauze pads, gauze roll, and tape.     General info for SNF    Length of Stay Estimate: Long Term Care  Condition at Discharge: Stable  Level of care:board and care  Rehabilitation Potential: Poor  Admission H&P remains valid and up-to-date: Yes  Recent Chemotherapy: N/A  Use Nursing Home Standing Orders: Yes     Mantoux instructions    Give two-step Mantoux (PPD) Per Facility Policy Yes     Follow Up and recommended labs and tests    Follow up with Nursing home physician.  No follow up labs or test are needed.  Admit to hospice.     Reason for your hospital stay    Left foot cellulitis with dry gangrene and osteomyelitis.  Peripheral artery disease s/p bilateral femoral to below-knee popliteal artery bypass.  Newly diagnosed left lower lobe lung mass suspicious for primary bronchogenic carcinoma.     No CPR- Do NOT Intubate     Diet    Follow this diet upon discharge: Regular Diet Adult     Significant Results and Procedures   Most Recent 3 CBC's:  Recent Labs   Lab Test 07/08/25  0732 07/05/25  1132 07/03/25  0911   WBC 6.0 8.0 8.1   HGB 9.4* 9.7* 9.0*   MCV 84 85 84    195 198     Most Recent 3 BMP's:  Recent Labs   Lab Test 07/08/25  0732 07/06/25  0725 07/05/25  1132    139 140   POTASSIUM 4.0 4.1 4.5   CHLORIDE 104 106 107   CO2 25 25 25   BUN 13.4 19.6 20.0   CR 0.88 1.01 0.97   ANIONGAP 9 8 8   ALCIDES 8.6* 8.5* 8.5*   GLC 95 98  98 115*     Results for orders placed or performed during the hospital encounter of 07/01/25   MR Foot Left w/o Contrast    Narrative    EXAM: MR FOOT LEFT W/O CONTRAST  LOCATION: [facility]  DATE: 7/3/2025    INDICATION: Osteomyelitis concern.  COMPARISON: 7/1/2025.  TECHNIQUE: Unenhanced.    FINDINGS:     Incomplete exam. Only 2 sequences are available to review, in addition to the localizer images. This includes sagittal STIR and coronal T1 images. Both series have severe motion artifact, which precludes adequate assessment  of the bones and bone marrow.   There is probably bone marrow edema in the distal phalanx and middle phalanx of the third toe (sagittal image 22). There is probably also bone marrow edema in the tuft of the distal phalanx (sagittal image 11). The severity of these findings is not   further delineated. The other bones and bone marrow or not adequately evaluated. Joint alignment appears anatomic without evidence of subluxation or dislocation. There is soft tissue edema throughout the foot.      Impression    IMPRESSION:  1.  Incomplete, nondiagnostic evaluation due to motion artifact and incomplete image acquisition.    2.  Probable bone marrow edema in the tuft of the distal phalanx of the first toe and in the distal and middle phalanges of the third toe. These findings are not definitively confirmed due to motion artifact. The significance/severity of these findings   is uncertain due to the lack of study completion.   X-ray lt Foot 3 vw port: In PACU    Narrative    EXAM: XR FOOT PORT LEFT 3 VIEWS  LOCATION: [facility]  DATE: 7/5/2025    INDICATION: post op  COMPARISON: 07/01/2025      Impression    IMPRESSION: Partial great toe amputation at the level of the proximal phalangeal head with adjacent soft tissue swelling. No definitive radiographic evidence of ongoing active osteomyelitis. No fracture. Lesser toe hammertoe deformities. Plantar and   Achilles calcaneal spurs. Atherosclerotic vascular calcifications.   XR Chest Port 1 View    Narrative    EXAM: XR CHEST PORT 1 VIEW  LOCATION: [facility]  DATE: 7/5/2025    INDICATION: new onset cough , rule out infection versus fluid overload  COMPARISON: None.      Impression    IMPRESSION: Sternotomy. Cardiomegaly. Normal pulmonary vascularity. Interstitial prominence lower lungs may represent atelectasis, edema or infection. No significant pleural effusion. Prominent left hilar opacity may represent enlarged  pulmonary artery   but outpatient CT suggested to exclude mass/lymphadenopathy. Aortic calcifications.   CT Chest w/o Contrast    Narrative    EXAM: CT CHEST W/O CONTRAST  LOCATION: [facility]  DATE: 7/6/2025    INDICATION: rule out lung mass or lymphadenopathy , abnormal xray  COMPARISON: Chest radiograph 7/5/2025.  TECHNIQUE: CT chest without IV contrast. Multiplanar reformats were obtained. Dose reduction techniques were used.  CONTRAST: None.    FINDINGS:   LUNGS AND PLEURA: 1.4 cm heterogeneous nodule along the right posterior wall of the lower trachea with some macroscopic fat density (series 6, image 65). Left lower lobe mass measuring up to 4.5 cm in maximal dimension (series 4, image 134). Small right   and trace left pleural effusions. Mild bilateral dependent consolidation. Mild emphysema. Right middle lobe calcified granuloma. Few additional punctate pulmonary nodules.    MEDIASTINUM/AXILLAE: Cardiomegaly. Normal caliber thoracic aorta with moderate calcified atherosclerotic plaque. Left hilar/subcarinal lymphadenopathy with a representative lymph node measuring 3.1 x 1.5 cm (series 3, image 64).    CORONARY ARTERY CALCIFICATION: Previous intervention (stents or CABG).    UPPER ABDOMEN: Bilateral adrenal nodules are stable since 10/11/2023, likely benign adenomas. Renal vascular calcifications and/or nonobstructing calculi.    MUSCULOSKELETAL: Thoracic spondylosis. Prior median sternotomy. No destructive osseous lesion.      Impression    IMPRESSION:   1.  Left lower lobe mass suspicious for primary bronchogenic carcinoma. Recommend follow-up PET/CT and/or tissue sampling.  2.  Left hilar/subcarinal lymphadenopathy, likely rafa metastatic disease.  3.  Macroscopic fat-containing tracheal nodule is indeterminate but could represent a tracheal lesion such as a hamartoma. Consider bronchoscopy.  4.  Small right and trace left pleural effusions.  5.  Mild nonspecific bilateral  dependent consolidation.  6.  Few additional punctate pulmonary nodules are indeterminate. Attention on follow-up.     Discharge Medications      Review of your medicines        START taking        Dose / Directions   acetaminophen 325 MG tablet  Commonly known as: TYLENOL  Used for: Pain      Dose: 975 mg  Take 3 tablets (975 mg) by mouth every 8 hours.  Refills: 0     amoxicillin-clavulanate 875-125 MG tablet  Commonly known as: AUGMENTIN  Used for: Cellulitis, unspecified cellulitis site      Dose: 1 tablet  Take 1 tablet by mouth 2 times daily for 3 days.  Refills: 0     oxyCODONE 5 MG tablet  Commonly known as: ROXICODONE  Used for: Pain      Dose: 5-10 mg  Take 1-2 tablets (5-10 mg) by mouth every 4 hours as needed for moderate to severe pain (5 mg for moderate pain, 10 mg for severe pain).  Quantity: 20 tablet  Refills: 0     senna-docusate 8.6-50 MG tablet  Commonly known as: SENOKOT-S/PERICOLACE  Used for: Constipation, unspecified constipation type      Dose: 1 tablet  Take 1 tablet by mouth 2 times daily as needed for constipation.  Refills: 0            CONTINUE these medicines which have NOT CHANGED        Dose / Directions   apixaban ANTICOAGULANT 5 MG tablet  Commonly known as: ELIQUIS      Dose: 1 tablet  Take 1 tablet by mouth 2 times daily.  Refills: 0     aspirin 81 MG EC tablet      Dose: 1 tablet  Take 1 tablet by mouth daily.  Refills: 0     atorvastatin 40 MG tablet  Commonly known as: LIPITOR      Dose: 1 tablet  Take 1 tablet by mouth at bedtime.  Refills: 0     doxazosin 8 MG tablet  Commonly known as: CARDURA      Dose: 1 tablet  Take 1 tablet by mouth at bedtime.  Refills: 0     hydroCHLOROthiazide 12.5 MG tablet      Dose: 1 tablet  Take 1 tablet by mouth daily.  Refills: 0     lisinopril 5 MG tablet  Commonly known as: ZESTRIL      Dose: 15 mg  Take 15 mg by mouth daily.  Refills: 0     metoprolol tartrate 25 MG tablet  Commonly known as: LOPRESSOR      Dose: 1 tablet  Take 1 tablet by  mouth 2 times daily.  Refills: 0            STOP taking      HYDROcodone-acetaminophen 7.5-325 MG per tablet  Commonly known as: NORCO        sildenafil 100 MG tablet  Commonly known as: VIAGRA        vitamin B-12 250 MCG tablet  Commonly known as: CYANOCOBALAMIN        Vitamin D3 25 mcg (1000 units) tablet  Commonly known as: CHOLECALCIFEROL                  Where to get your medicines        Some of these will need a paper prescription and others can be bought over the counter. Ask your nurse if you have questions.    Bring a paper prescription for each of these medications  oxyCODONE 5 MG tablet       Allergies   Allergies   Allergen Reactions    Alendronate     Simvastatin

## 2025-07-09 NOTE — PLAN OF CARE
Diagnosis: Fem bypass (bilateral groin), L toe partial amputation  POD#: 6 for fem bypass and 3 for L toe partial amputation  Mental Status: A/O x3, disoriented to time, forgetful   Activity/dangle: Ax1 w/  GB and walker   Diet: Regular  Pain:Oxy Prn given  Boyd/Voiding: Voids in bathroom  Tele/Restraints/Iso: NA  02/LDA:RADARWIN IV SL  D/C Date:Likely tomorrow, back to [place]  Other Info: Mepilex on coccyx for redness. L toe dressing CDI w/ ace wrap. Bilateral groin incisions.

## 2025-07-09 NOTE — PLAN OF CARE
Goal Outcome Evaluation:      Trauma/Ortho/Medical (Choose one) Ortho    Diagnosis: Dry Gangrene  POD#:4 of partial left great toe amputation, POD # 7 of femoral bypass  Mental Status: A&O x3, forgetful  Activity/dangle Assist of 1 with gait belt and walker  Diet: Reg  Pain: Oxycodone and scheduled tylenol  Boyd/Voiding: Voiding in bathroom  Tele/Restraints/Iso: NA  02/LDA: RA/ No iv access  D/C Date: [place] [facility] [place] [facility] [place]  Other Info: Dressing on left toe CDI. Dressing on bilateral groin with dried drainage.  Mepilex on coccyx for redness.

## 2025-07-09 NOTE — PROGRESS NOTES
Care Management Discharge Note    Discharge Date: 07/09/2025       Discharge Disposition:      Discharge Services:      Discharge DME:      Discharge Transportation: family or friend will provide    Private pay costs discussed: N/A    Does the patient's insurance plan have a 3 day qualifying hospital stay waiver?  No    PAS Confirmation Code:    Patient/family educated on Medicare website which has current facility and service quality ratings:      Education Provided on the Discharge Plan:    Persons Notified of Discharge Plans: Yes  Patient/Family in Agreement with the Plan:      Handoff Referral Completed: No, handoff not indicated or clinically appropriate    Additional Information:  Theodor is discharging today 7/9/2025 to LTC facility and enrolling in Hospice.  [facility] on the [address] [address]  Ph: [phone]  Fax [phone]    [facility] will be enrolling and coordinating the start of Hospice services once admitted. [facility] did not set up Hospice prior to Discharge.     Discharge orders sent via [facility]     Transportation is being provided by [facility] [employer] transport [phone]    Mary Pepe BSW      12-Jan-2017 04:59

## 2025-07-09 NOTE — PROGRESS NOTES
7/8 2300-0730     Diagnosis: Fem bypass (bilateral groin), L toe partial amputation    POD# 7 for femoral bypass;   POD# 4 for L toe partial amputation    Mental Status: A&O x3, disoriented to time, forgetful   Activity/dangle: Assistx1 GB/W, Rooke boot when ambulating   Diet: Regular  Pain: PRN oxy availble, no pain meds given on shift  Boyd/Voiding: BR  Tele/Restraints/Iso: NA  02/LDA: DI JARA  D/C Date: Most likely back to assisted living today transitioning to hospice   Other Info: VSS, ex slightly elevated BP. Mepilex on coccyx for blanchable redness. L toe dressing CDI w/ ace wrap. Bilateral groin incisions, scant drainage.

## 2025-07-09 NOTE — PROGRESS NOTES
Care Management Follow Up    Length of Stay (days): 8    Expected Discharge Date: 07/09/2025     Concerns to be Addressed: discharge planning     Patient plan of care discussed at interdisciplinary rounds: Yes    Anticipated Discharge Disposition: LTC   Anticipated Discharge Services:    Anticipated Discharge DME:      Patient/family educated on Medicare website which has current facility and service quality ratings:    Education Provided on the Discharge Plan:    Patient/Family in Agreement with the Plan:  Yes    Referrals Placed by CM/SW:  LTC   Private pay costs discussed: Not applicable    Discussed  Partnership in Safe Discharge Planning  document with patient/family: No     Handoff Completed: No, handoff not indicated or clinically appropriate    Additional Information:  Discharge orders requested with note to indicate Admitting to LTC with Hospice.      Add 1137: Orders sent to [facility] via [facility] SW faxed over pain script.     Next Steps: discharge note.     ELVI VegaW

## 2025-07-10 ENCOUNTER — PATIENT OUTREACH (OUTPATIENT)
Dept: CARE COORDINATION | Facility: CLINIC | Age: OVER 89
End: 2025-07-10
Payer: COMMERCIAL

## 2025-07-10 LAB
PATH REPORT.COMMENTS IMP SPEC: NORMAL
PATH REPORT.COMMENTS IMP SPEC: NORMAL
PATH REPORT.FINAL DX SPEC: NORMAL
PATH REPORT.GROSS SPEC: NORMAL
PATH REPORT.MICROSCOPIC SPEC OTHER STN: NORMAL
PATH REPORT.RELEVANT HX SPEC: NORMAL
PHOTO IMAGE: NORMAL

## 2025-07-10 PROCEDURE — 88305 TISSUE EXAM BY PATHOLOGIST: CPT | Mod: 26 | Performed by: PATHOLOGY

## 2025-07-10 PROCEDURE — 88311 DECALCIFY TISSUE: CPT | Mod: 26 | Performed by: PATHOLOGY

## 2025-07-10 NOTE — PROGRESS NOTES
Connected Care Resource Center: Connected [facility] [place]    Background: Transitional Care Management program identified per system criteria and reviewed by Connected Care Resource Center team for possible outreach.    Assessment: Upon chart review, CCR Team member will not proceed with patient outreach related to this episode of Transitional Care Management program due to reason below:    Patient has discharged to a Memory Care, Long-term Care, Assisted Living or Group Home where patient is receiving on-site support with their daily cares, including support with hospital follow up plan.    Plan: Transitional Care Management episode addressed appropriately per reason noted above.      YASIR Flor  Connected Care Resource [place], [facility]    *Connected Care Resource Team does NOT follow patient ongoing. Referrals are identified based on internal discharge reports and the outreach is to ensure patient has an understanding of their discharge instructions.

## (undated) DEVICE — SURGICEL HEMOSTAT 2X14" 1951

## (undated) DEVICE — SU PROLENE 4-0 PS-2 18" 8682G

## (undated) DEVICE — DRSG KERLIX FLUFFS X5

## (undated) DEVICE — BLADE SAW OSCILLATING STRYK MED 9.0X25X0.38MM 2296-003-111

## (undated) DEVICE — NDL BLUNT 19GA 1.5"

## (undated) DEVICE — CLIP ETHICON LIGACLIP SM BLUE LT100

## (undated) DEVICE — SU SILK 2-0 TIE 24" SA75H

## (undated) DEVICE — SU PROLENE 5-0 RB-1DA 36"  8556H

## (undated) DEVICE — GLOVE PROTEXIS BLUE W/NEU-THERA 6.5  2D73EB65

## (undated) DEVICE — DRAPE STERI TOWEL LG 1010

## (undated) DEVICE — SU VICRYL 3-0 SH 27" J316H

## (undated) DEVICE — SYR 03ML LL W/O NDL 309657

## (undated) DEVICE — SUCTION MANIFOLD NEPTUNE 2 SYS 4 PORT 0702-020-000

## (undated) DEVICE — SOL NACL 0.9% INJ 250ML BAG 2B1322Q

## (undated) DEVICE — PACK VASCULAR SCV15VAFSB

## (undated) DEVICE — SYR 30ML SLIP TIP W/O NDL

## (undated) DEVICE — SPONGE RAY-TEC 4X8" 7318

## (undated) DEVICE — SOL NACL 0.9% IRRIG 1000ML BOTTLE 2F7124

## (undated) DEVICE — ESU GROUND PAD E7506

## (undated) DEVICE — PREP CHLORAPREP 26ML TINTED HI-LITE ORANGE 930815

## (undated) DEVICE — BAG DECANTER STERILE WHITE DYNJDEC09

## (undated) DEVICE — ESU PENCIL W/HOLSTER E2350H

## (undated) DEVICE — TRAY PREP DRY SKIN SCRUB 067

## (undated) DEVICE — BLADE KNIFE SURG 15 371115

## (undated) DEVICE — Device

## (undated) DEVICE — KIT TURNOVER FAIRVIEW SOUTHDALE FULL SP3889

## (undated) DEVICE — SU PROLENE 6-0 RB-2DA 30" 8711H

## (undated) DEVICE — SU DERMABOND ADVANCED .7ML DNX12

## (undated) DEVICE — BLADE CLIPPER 4406

## (undated) DEVICE — SU VICRYL 2-0 SH 27" J317H

## (undated) DEVICE — LINEN TOWEL PACK X5 5464

## (undated) DEVICE — SU MONOCRYL 4-0 PS-2 18" UND Y496G

## (undated) DEVICE — STPL SKIN 35W 6.9MM  PXW35

## (undated) DEVICE — GLOVE PROTEXIS POWDER FREE 6.5 ORTHO LIME 2D73ET65

## (undated) DEVICE — PACK EXTREMITY SOP15EXFSD

## (undated) DEVICE — SU PROLENE 3-0 PS-2 18" 8687H

## (undated) DEVICE — SU PROLENE 7-0 BV-1DA 24" 8702H

## (undated) DEVICE — SU SILK 3-0 TIE 24" SA74H

## (undated) DEVICE — NDL 25GA 1.5" 305127

## (undated) DEVICE — SOL WATER IRRIG 1000ML BOTTLE 2F7114

## (undated) DEVICE — DRAPE SHEET REV FOLD 3/4 9349

## (undated) DEVICE — ESU GROUND PAD UNIVERSAL W/O CORD

## (undated) DEVICE — DRAPE IOBAN INCISE 23X17" 6650EZ

## (undated) DEVICE — SU DERMABOND PRINEO 22CM CLR222US

## (undated) DEVICE — SYR 10ML LL W/O NDL 302995

## (undated) RX ORDER — FENTANYL CITRATE 50 UG/ML
INJECTION, SOLUTION INTRAMUSCULAR; INTRAVENOUS
Status: DISPENSED
Start: 2025-07-02

## (undated) RX ORDER — PROPOFOL 10 MG/ML
INJECTION, EMULSION INTRAVENOUS
Status: DISPENSED
Start: 2025-07-05

## (undated) RX ORDER — HEPARIN SODIUM 1000 [USP'U]/ML
INJECTION, SOLUTION INTRAVENOUS; SUBCUTANEOUS
Status: DISPENSED
Start: 2025-07-02

## (undated) RX ORDER — ONDANSETRON 2 MG/ML
INJECTION INTRAMUSCULAR; INTRAVENOUS
Status: DISPENSED
Start: 2025-07-05

## (undated) RX ORDER — PROPOFOL 10 MG/ML
INJECTION, EMULSION INTRAVENOUS
Status: DISPENSED
Start: 2025-07-02

## (undated) RX ORDER — LIDOCAINE HYDROCHLORIDE 20 MG/ML
INJECTION, SOLUTION INFILTRATION; PERINEURAL
Status: DISPENSED
Start: 2025-07-02

## (undated) RX ORDER — FENTANYL CITRATE 0.05 MG/ML
INJECTION, SOLUTION INTRAMUSCULAR; INTRAVENOUS
Status: DISPENSED
Start: 2025-07-05

## (undated) RX ORDER — ONDANSETRON 2 MG/ML
INJECTION INTRAMUSCULAR; INTRAVENOUS
Status: DISPENSED
Start: 2025-07-02

## (undated) RX ORDER — METRONIDAZOLE 500 MG/100ML
INJECTION, SOLUTION INTRAVENOUS
Status: DISPENSED
Start: 2025-07-05

## (undated) RX ORDER — DEXAMETHASONE SODIUM PHOSPHATE 4 MG/ML
INJECTION, SOLUTION INTRA-ARTICULAR; INTRALESIONAL; INTRAMUSCULAR; INTRAVENOUS; SOFT TISSUE
Status: DISPENSED
Start: 2025-07-02

## (undated) RX ORDER — BUPIVACAINE HYDROCHLORIDE 2.5 MG/ML
INJECTION, SOLUTION EPIDURAL; INFILTRATION; INTRACAUDAL; PERINEURAL
Status: DISPENSED
Start: 2025-07-05

## (undated) RX ORDER — BUPIVACAINE HYDROCHLORIDE 5 MG/ML
INJECTION, SOLUTION EPIDURAL; INTRACAUDAL; PERINEURAL
Status: DISPENSED
Start: 2025-07-02

## (undated) RX ORDER — GLYCOPYRROLATE 0.2 MG/ML
INJECTION, SOLUTION INTRAMUSCULAR; INTRAVENOUS
Status: DISPENSED
Start: 2025-07-05

## (undated) RX ORDER — FENTANYL CITRATE 50 UG/ML
INJECTION, SOLUTION INTRAMUSCULAR; INTRAVENOUS
Status: DISPENSED
Start: 2025-07-05